# Patient Record
Sex: FEMALE | Race: OTHER | NOT HISPANIC OR LATINO | URBAN - METROPOLITAN AREA
[De-identification: names, ages, dates, MRNs, and addresses within clinical notes are randomized per-mention and may not be internally consistent; named-entity substitution may affect disease eponyms.]

---

## 2017-12-03 ENCOUNTER — EMERGENCY (EMERGENCY)
Facility: HOSPITAL | Age: 52
LOS: 1 days | Discharge: HOME | End: 2017-12-03
Attending: EMERGENCY MEDICINE | Admitting: EMERGENCY MEDICINE
Payer: COMMERCIAL

## 2017-12-03 VITALS
RESPIRATION RATE: 16 BRPM | HEART RATE: 104 BPM | TEMPERATURE: 98 F | DIASTOLIC BLOOD PRESSURE: 93 MMHG | OXYGEN SATURATION: 100 % | SYSTOLIC BLOOD PRESSURE: 145 MMHG

## 2017-12-03 PROCEDURE — 99285 EMERGENCY DEPT VISIT HI MDM: CPT | Mod: 25

## 2017-12-03 PROCEDURE — 20610 DRAIN/INJ JOINT/BURSA W/O US: CPT | Mod: RT

## 2017-12-03 NOTE — ED ADULT TRIAGE NOTE - CHIEF COMPLAINT QUOTE
Pt st" I woke up yesterday am with rt upper leg pain.....I can't walk...if I try to take a step I don't have strength to stand and upper leg is swollen." Denies trauma. Denies recent air travel/car trips. Hx Asthma, hysterectomy, left knee surgery.

## 2017-12-04 VITALS
DIASTOLIC BLOOD PRESSURE: 79 MMHG | RESPIRATION RATE: 14 BRPM | SYSTOLIC BLOOD PRESSURE: 147 MMHG | TEMPERATURE: 99 F | OXYGEN SATURATION: 100 % | HEART RATE: 89 BPM

## 2017-12-04 DIAGNOSIS — Z90.710 ACQUIRED ABSENCE OF BOTH CERVIX AND UTERUS: Chronic | ICD-10-CM

## 2017-12-04 LAB
ALBUMIN SERPL ELPH-MCNC: 4.2 G/DL — SIGNIFICANT CHANGE UP (ref 3.3–5)
ALP SERPL-CCNC: 133 U/L — HIGH (ref 40–120)
ALT FLD-CCNC: 19 U/L — SIGNIFICANT CHANGE UP (ref 4–33)
AST SERPL-CCNC: 24 U/L — SIGNIFICANT CHANGE UP (ref 4–32)
BASE EXCESS BLDV CALC-SCNC: 3.5 MMOL/L — SIGNIFICANT CHANGE UP
BASOPHILS # BLD AUTO: 0.03 K/UL — SIGNIFICANT CHANGE UP (ref 0–0.2)
BASOPHILS NFR BLD AUTO: 0.3 % — SIGNIFICANT CHANGE UP (ref 0–2)
BILIRUB SERPL-MCNC: 0.5 MG/DL — SIGNIFICANT CHANGE UP (ref 0.2–1.2)
BLOOD GAS VENOUS - CREATININE: 0.69 MG/DL — SIGNIFICANT CHANGE UP (ref 0.5–1.3)
BODY FLUID TYPE: SIGNIFICANT CHANGE UP
BUN SERPL-MCNC: 14 MG/DL — SIGNIFICANT CHANGE UP (ref 7–23)
C3 SERPL-MCNC: 156.4 MG/DL — SIGNIFICANT CHANGE UP (ref 90–180)
C4 SERPL-MCNC: 43.5 MG/DL — HIGH (ref 10–40)
CALCIUM SERPL-MCNC: 9 MG/DL — SIGNIFICANT CHANGE UP (ref 8.4–10.5)
CHLORIDE BLDV-SCNC: 103 MMOL/L — SIGNIFICANT CHANGE UP (ref 96–108)
CHLORIDE SERPL-SCNC: 100 MMOL/L — SIGNIFICANT CHANGE UP (ref 98–107)
CLARITY SPEC: SIGNIFICANT CHANGE UP
CO2 SERPL-SCNC: 25 MMOL/L — SIGNIFICANT CHANGE UP (ref 22–31)
COLOR FLD: SIGNIFICANT CHANGE UP
CREAT SERPL-MCNC: 0.8 MG/DL — SIGNIFICANT CHANGE UP (ref 0.5–1.3)
CRP SERPL-MCNC: 62.2 MG/L — HIGH
CRYSTALS FLD MICRO: SIGNIFICANT CHANGE UP
EOSINOPHIL # BLD AUTO: 0.08 K/UL — SIGNIFICANT CHANGE UP (ref 0–0.5)
EOSINOPHIL NFR BLD AUTO: 0.8 % — SIGNIFICANT CHANGE UP (ref 0–6)
GAS PNL BLDV: 141 MMOL/L — SIGNIFICANT CHANGE UP (ref 136–146)
GLUCOSE BLDV-MCNC: 108 — HIGH (ref 70–99)
GLUCOSE FLD-MCNC: 107 MG/DL — SIGNIFICANT CHANGE UP
GLUCOSE SERPL-MCNC: 101 MG/DL — HIGH (ref 70–99)
GRAM STN FLD: SIGNIFICANT CHANGE UP
HCO3 BLDV-SCNC: 26 MMOL/L — SIGNIFICANT CHANGE UP (ref 20–27)
HCT VFR BLD CALC: 44.9 % — SIGNIFICANT CHANGE UP (ref 34.5–45)
HCT VFR BLDV CALC: 45.1 % — HIGH (ref 34.5–45)
HGB BLD-MCNC: 14.4 G/DL — SIGNIFICANT CHANGE UP (ref 11.5–15.5)
HGB BLDV-MCNC: 14.7 G/DL — SIGNIFICANT CHANGE UP (ref 11.5–15.5)
IMM GRANULOCYTES # BLD AUTO: 0.03 # — SIGNIFICANT CHANGE UP
IMM GRANULOCYTES NFR BLD AUTO: 0.3 % — SIGNIFICANT CHANGE UP (ref 0–1.5)
LACTATE BLDV-MCNC: SIGNIFICANT CHANGE UP MMOL/L (ref 0.5–2)
LYMPHOCYTES # BLD AUTO: 1.74 K/UL — SIGNIFICANT CHANGE UP (ref 1–3.3)
LYMPHOCYTES # BLD AUTO: 17.8 % — SIGNIFICANT CHANGE UP (ref 13–44)
LYMPHOCYTES NFR FLD: 8 % — SIGNIFICANT CHANGE UP
MCHC RBC-ENTMCNC: 26.8 PG — LOW (ref 27–34)
MCHC RBC-ENTMCNC: 32.1 % — SIGNIFICANT CHANGE UP (ref 32–36)
MCV RBC AUTO: 83.5 FL — SIGNIFICANT CHANGE UP (ref 80–100)
MONOCYTES # BLD AUTO: 1.15 K/UL — HIGH (ref 0–0.9)
MONOCYTES # FLD: 9 % — SIGNIFICANT CHANGE UP
MONOCYTES NFR BLD AUTO: 11.8 % — SIGNIFICANT CHANGE UP (ref 2–14)
NEUTROPHILS # BLD AUTO: 6.72 K/UL — SIGNIFICANT CHANGE UP (ref 1.8–7.4)
NEUTROPHILS NFR BLD AUTO: 69 % — SIGNIFICANT CHANGE UP (ref 43–77)
NEUTS SEG NFR FLD MANUAL: 83 % — SIGNIFICANT CHANGE UP
NRBC # FLD: 0 — SIGNIFICANT CHANGE UP
PCO2 BLDV: 45 MMHG — SIGNIFICANT CHANGE UP (ref 41–51)
PH BLDV: 7.41 PH — SIGNIFICANT CHANGE UP (ref 7.32–7.43)
PLATELET # BLD AUTO: 222 K/UL — SIGNIFICANT CHANGE UP (ref 150–400)
PMV BLD: 12 FL — SIGNIFICANT CHANGE UP (ref 7–13)
PO2 BLDV: 29 MMHG — LOW (ref 35–40)
POTASSIUM BLDV-SCNC: 3.5 MMOL/L — SIGNIFICANT CHANGE UP (ref 3.4–4.5)
POTASSIUM SERPL-MCNC: 4 MMOL/L — SIGNIFICANT CHANGE UP (ref 3.5–5.3)
POTASSIUM SERPL-SCNC: 4 MMOL/L — SIGNIFICANT CHANGE UP (ref 3.5–5.3)
PROT FLD-MCNC: 7 G/DL — SIGNIFICANT CHANGE UP
PROT SERPL-MCNC: 7.9 G/DL — SIGNIFICANT CHANGE UP (ref 6–8.3)
RBC # BLD: 5.38 M/UL — HIGH (ref 3.8–5.2)
RBC # FLD: 14.4 % — SIGNIFICANT CHANGE UP (ref 10.3–14.5)
RCV VOL RI: HIGH CELL/UL (ref 0–5)
SAO2 % BLDV: 55.4 % — LOW (ref 60–85)
SODIUM SERPL-SCNC: 140 MMOL/L — SIGNIFICANT CHANGE UP (ref 135–145)
SPECIMEN SOURCE: SIGNIFICANT CHANGE UP
TOTAL CELLS COUNTED, BODY FLUID: 100 CELLS — SIGNIFICANT CHANGE UP
TOTAL NUCLEATED CELL COUNT, BODY FLUID: HIGH CELL/UL (ref 0–5)
WBC # BLD: 9.75 K/UL — SIGNIFICANT CHANGE UP (ref 3.8–10.5)
WBC # FLD AUTO: 9.75 K/UL — SIGNIFICANT CHANGE UP (ref 3.8–10.5)

## 2017-12-04 PROCEDURE — 73564 X-RAY EXAM KNEE 4 OR MORE: CPT | Mod: 26,RT

## 2017-12-04 RX ORDER — ACETAMINOPHEN 500 MG
1000 TABLET ORAL ONCE
Qty: 0 | Refills: 0 | Status: COMPLETED | OUTPATIENT
Start: 2017-12-04 | End: 2017-12-04

## 2017-12-04 RX ORDER — KETOROLAC TROMETHAMINE 30 MG/ML
15 SYRINGE (ML) INJECTION ONCE
Qty: 0 | Refills: 0 | Status: DISCONTINUED | OUTPATIENT
Start: 2017-12-04 | End: 2017-12-04

## 2017-12-04 RX ORDER — MORPHINE SULFATE 50 MG/1
4 CAPSULE, EXTENDED RELEASE ORAL ONCE
Qty: 0 | Refills: 0 | Status: DISCONTINUED | OUTPATIENT
Start: 2017-12-04 | End: 2017-12-04

## 2017-12-04 RX ORDER — OXYCODONE AND ACETAMINOPHEN 5; 325 MG/1; MG/1
1 TABLET ORAL ONCE
Qty: 0 | Refills: 0 | Status: DISCONTINUED | OUTPATIENT
Start: 2017-12-04 | End: 2017-12-04

## 2017-12-04 RX ORDER — IBUPROFEN 200 MG
1 TABLET ORAL
Qty: 21 | Refills: 0 | OUTPATIENT
Start: 2017-12-04 | End: 2017-12-11

## 2017-12-04 RX ADMIN — Medication 15 MILLIGRAM(S): at 02:31

## 2017-12-04 RX ADMIN — MORPHINE SULFATE 4 MILLIGRAM(S): 50 CAPSULE, EXTENDED RELEASE ORAL at 03:29

## 2017-12-04 RX ADMIN — Medication 15 MILLIGRAM(S): at 02:16

## 2017-12-04 RX ADMIN — Medication 400 MILLIGRAM(S): at 06:13

## 2017-12-04 NOTE — ED PROVIDER NOTE - OBJECTIVE STATEMENT
51 y/o F PMH asthma, HNP c/o atraumatic right knee pain and swelling 53 y/o F PMH asthma, HNP c/o atraumatic right knee pain and swelling x 2 days. Pt states she woke up with the pain, does not recall any trauma. Notes pain worsening and has been unable to weight bear on rle. Denies fever, chills, CP, SOB, numbness, tingling, weakness, h/o DVT/PE, recent travel/sx, hormone use. 51 y/o F PMH asthma, HNP c/o atraumatic right knee pain and swelling x 2 days. Pt states she woke up with the pain, does not recall any trauma. Notes pain worsening and has been unable to weight bear on RLE. Denies fever, chills, CP, SOB, numbness, tingling, weakness, h/o DVT/PE, recent travel/sx, hormone use.  Well appearing otherwise but appears uncomfortable when attempting to range right knee.

## 2017-12-04 NOTE — ED PROVIDER NOTE - MEDICAL DECISION MAKING DETAILS
53 y/o F with atraumatic right knee pain  -cbc, cmp, xray, pain control, reassess 51 y/o F with atraumatic right knee pain, swelling, and extremely limited ROM.  No infectious risk factors, but presentation concerning for septic joint.  Would send labs, pain control and tap knee.

## 2017-12-04 NOTE — ED PROVIDER NOTE - CARE PLAN
Principal Discharge DX:	Knee pain  Instructions for follow-up, activity and diet:	Rest, keep extremity elevated whenever possible  Apply ice to affected area 15 min on/15 min off  Keep splint clean, dry and intact  Take Motrin 600mg every 8 hours with food as needed for pain  Take Percocet 5/325mg every 6 hours as needed for severe pain- DO NOT drive on this medication  Follow up with your PMD within 2-3 days  Follow up with orthopedist within one week  Return to the ER for worsening symptoms including fever, redness or red streaking to affected area, etc. Principal Discharge DX:	Knee effusion, right  Instructions for follow-up, activity and diet:	Rest, keep extremity elevated whenever possible  Apply ice to affected area 15 min on/15 min off  Keep splint clean, dry and intact  Take Motrin 600mg every 8 hours with food as needed for pain  Take Percocet 5/325mg every 6 hours as needed for severe pain- DO NOT drive on this medication  Follow up with your PMD within 2-3 days  Follow up with orthopedist within one week  Return to the ER for worsening symptoms including fever, redness or red streaking to affected area, etc.

## 2017-12-04 NOTE — ED PROCEDURE NOTE - NS_EDPROVIDERDISPOUSERTYPE_ED_A_ED
I have personally evaluated and examined the patient. The Attending was available to me as a supervising provider if needed.
I have personally evaluated and examined the patient. The Attending was available to me as a supervising provider if needed.

## 2017-12-04 NOTE — ED PROVIDER NOTE - PSH
H/O: hysterectomy    History of Dilatation and Curettage    History of Nasal Septoplasty and sinus surgery 2003    left Knee  trauma  h/o repair 1985    S/P Oophorectomy right 2006

## 2017-12-04 NOTE — ED PROVIDER NOTE - PLAN OF CARE
Rest, keep extremity elevated whenever possible  Apply ice to affected area 15 min on/15 min off  Keep splint clean, dry and intact  Take Motrin 600mg every 8 hours with food as needed for pain  Take Percocet 5/325mg every 6 hours as needed for severe pain- DO NOT drive on this medication  Follow up with your PMD within 2-3 days  Follow up with orthopedist within one week  Return to the ER for worsening symptoms including fever, redness or red streaking to affected area, etc.

## 2017-12-04 NOTE — ED PROVIDER NOTE - PROGRESS NOTE DETAILS
arthrocentesis results do not appear to be infectious in origin, pt has had minimal relief with pain meds but states she would like to go home and take po meds and f/u with ortho, crutches and knee immobilizer given, pt ambulates well with crutches.

## 2017-12-04 NOTE — ED PROVIDER NOTE - SKIN AREA #1
multiple small abrasion to b/l le from cat scratches, no surrounsding erythema or red streaking, not hot to touch/anterior

## 2017-12-04 NOTE — ED PROCEDURE NOTE - CPROC ED POST PROC CARE GUIDE1
Verbal/written post procedure instructions were given to patient/caregiver./Instructed patient/caregiver to follow-up with primary care physician./Instructed patient/caregiver regarding signs and symptoms of infection./Elevate the injured extremity as instructed./Keep the cast/splint/dressing clean and dry.
Instructed patient/caregiver regarding signs and symptoms of infection./Instructed patient/caregiver to follow-up with primary care physician./Keep the cast/splint/dressing clean and dry./Verbal/written post procedure instructions were given to patient/caregiver.

## 2017-12-04 NOTE — ED PROVIDER NOTE - ATTENDING CONTRIBUTION TO CARE
ED Attending (Dr Bolton): I have personally performed a face to face bedside history and physical examination of this patient.  I have discussed the case with the PA and  I have personally authored the following components: HPI, ROS, Physical Exam and MDM in addition to reviewing and revising the rest of the Provider Note.  51 y/o F with atraumatic right knee pain, swelling, and extremely limited ROM.  No infectious risk factors, but presentation concerning for septic joint.  Would send labs, pain control and tap knee.

## 2017-12-05 LAB
SPECIMEN SOURCE: SIGNIFICANT CHANGE UP
SPECIMEN SOURCE: SIGNIFICANT CHANGE UP

## 2017-12-09 LAB
BACTERIA BLD CULT: SIGNIFICANT CHANGE UP
BACTERIA BLD CULT: SIGNIFICANT CHANGE UP
BACTERIA FLD CULT: SIGNIFICANT CHANGE UP

## 2018-09-09 NOTE — ED PROVIDER NOTE - LOWER EXTREMITY EXAM, RIGHT
JOINT SWELLING/LIMITED ROM/SWELLING/no obvious deformity, + generalized swelling greatest over lateral suprapatellar aspect, + hot to touch, no erythema or red streaking, + TTP to anterior knee greatest over lateral suprapatellar region, ROM limited - able to passively flex toapprox 30 degrees and fully extend, sensation and strength intact, 2+DP/TENDERNESS Yes

## 2019-03-28 ENCOUNTER — INPATIENT (INPATIENT)
Facility: HOSPITAL | Age: 54
LOS: 3 days | Discharge: HOME | End: 2019-04-01
Attending: INTERNAL MEDICINE | Admitting: INTERNAL MEDICINE
Payer: COMMERCIAL

## 2019-03-28 VITALS — WEIGHT: 229.28 LBS

## 2019-03-28 DIAGNOSIS — Z90.710 ACQUIRED ABSENCE OF BOTH CERVIX AND UTERUS: Chronic | ICD-10-CM

## 2019-03-28 LAB
APTT BLD: 32.7 SEC — SIGNIFICANT CHANGE UP (ref 27.5–36.3)
BASE EXCESS BLDV CALC-SCNC: 4.3 MMOL/L — SIGNIFICANT CHANGE UP
BASOPHILS # BLD AUTO: 0.03 K/UL — SIGNIFICANT CHANGE UP (ref 0–0.2)
BASOPHILS NFR BLD AUTO: 0.5 % — SIGNIFICANT CHANGE UP (ref 0–2)
BLOOD GAS VENOUS - CREATININE: SIGNIFICANT CHANGE UP MG/DL (ref 0.5–1.3)
CHLORIDE BLDV-SCNC: 107 MMOL/L — SIGNIFICANT CHANGE UP (ref 96–108)
EOSINOPHIL # BLD AUTO: 0.19 K/UL — SIGNIFICANT CHANGE UP (ref 0–0.5)
EOSINOPHIL NFR BLD AUTO: 3.3 % — SIGNIFICANT CHANGE UP (ref 0–6)
GAS PNL BLDV: 141 MMOL/L — SIGNIFICANT CHANGE UP (ref 136–146)
GLUCOSE BLDV-MCNC: 150 — HIGH (ref 70–99)
HCO3 BLDV-SCNC: 28 MMOL/L — HIGH (ref 20–27)
HCT VFR BLD CALC: 45 % — SIGNIFICANT CHANGE UP (ref 34.5–45)
HCT VFR BLDV CALC: 41.6 % — SIGNIFICANT CHANGE UP (ref 34.5–45)
HGB BLD-MCNC: 13.8 G/DL — SIGNIFICANT CHANGE UP (ref 11.5–15.5)
HGB BLDV-MCNC: 13.6 G/DL — SIGNIFICANT CHANGE UP (ref 11.5–15.5)
IMM GRANULOCYTES NFR BLD AUTO: 0.4 % — SIGNIFICANT CHANGE UP (ref 0–1.5)
INR BLD: 0.98 — SIGNIFICANT CHANGE UP (ref 0.88–1.17)
LACTATE BLDV-MCNC: 2.3 MMOL/L — HIGH (ref 0.5–2)
LYMPHOCYTES # BLD AUTO: 1.69 K/UL — SIGNIFICANT CHANGE UP (ref 1–3.3)
LYMPHOCYTES # BLD AUTO: 29.7 % — SIGNIFICANT CHANGE UP (ref 13–44)
MCHC RBC-ENTMCNC: 26.1 PG — LOW (ref 27–34)
MCHC RBC-ENTMCNC: 30.7 % — LOW (ref 32–36)
MCV RBC AUTO: 85.2 FL — SIGNIFICANT CHANGE UP (ref 80–100)
MONOCYTES # BLD AUTO: 0.74 K/UL — SIGNIFICANT CHANGE UP (ref 0–0.9)
MONOCYTES NFR BLD AUTO: 13 % — SIGNIFICANT CHANGE UP (ref 2–14)
NEUTROPHILS # BLD AUTO: 3.02 K/UL — SIGNIFICANT CHANGE UP (ref 1.8–7.4)
NEUTROPHILS NFR BLD AUTO: 53.1 % — SIGNIFICANT CHANGE UP (ref 43–77)
NRBC # FLD: 0 K/UL — SIGNIFICANT CHANGE UP (ref 0–0)
PCO2 BLDV: 45 MMHG — SIGNIFICANT CHANGE UP (ref 41–51)
PH BLDV: 7.42 PH — SIGNIFICANT CHANGE UP (ref 7.32–7.43)
PLATELET # BLD AUTO: 232 K/UL — SIGNIFICANT CHANGE UP (ref 150–400)
PMV BLD: 11.3 FL — SIGNIFICANT CHANGE UP (ref 7–13)
PO2 BLDV: 51 MMHG — HIGH (ref 35–40)
POTASSIUM BLDV-SCNC: 3.9 MMOL/L — SIGNIFICANT CHANGE UP (ref 3.4–4.5)
PROTHROM AB SERPL-ACNC: 11.2 SEC — SIGNIFICANT CHANGE UP (ref 9.8–13.1)
RBC # BLD: 5.28 M/UL — HIGH (ref 3.8–5.2)
RBC # FLD: 14.5 % — SIGNIFICANT CHANGE UP (ref 10.3–14.5)
SAO2 % BLDV: 86.6 % — HIGH (ref 60–85)
WBC # BLD: 5.69 K/UL — SIGNIFICANT CHANGE UP (ref 3.8–10.5)
WBC # FLD AUTO: 5.69 K/UL — SIGNIFICANT CHANGE UP (ref 3.8–10.5)

## 2019-03-28 PROCEDURE — 71045 X-RAY EXAM CHEST 1 VIEW: CPT | Mod: 26

## 2019-03-28 RX ORDER — HEPARIN SODIUM 5000 [USP'U]/ML
4000 INJECTION INTRAVENOUS; SUBCUTANEOUS EVERY 6 HOURS
Qty: 0 | Refills: 0 | Status: DISCONTINUED | OUTPATIENT
Start: 2019-03-28 | End: 2019-03-29

## 2019-03-28 RX ORDER — HEPARIN SODIUM 5000 [USP'U]/ML
INJECTION INTRAVENOUS; SUBCUTANEOUS
Qty: 25000 | Refills: 0 | Status: DISCONTINUED | OUTPATIENT
Start: 2019-03-28 | End: 2019-03-29

## 2019-03-28 RX ORDER — HEPARIN SODIUM 5000 [USP'U]/ML
4000 INJECTION INTRAVENOUS; SUBCUTANEOUS ONCE
Qty: 0 | Refills: 0 | Status: COMPLETED | OUTPATIENT
Start: 2019-03-28 | End: 2019-03-28

## 2019-03-28 RX ORDER — CLOPIDOGREL BISULFATE 75 MG/1
600 TABLET, FILM COATED ORAL ONCE
Qty: 0 | Refills: 0 | Status: COMPLETED | OUTPATIENT
Start: 2019-03-28 | End: 2019-03-28

## 2019-03-28 RX ADMIN — CLOPIDOGREL BISULFATE 600 MILLIGRAM(S): 75 TABLET, FILM COATED ORAL at 23:24

## 2019-03-28 RX ADMIN — HEPARIN SODIUM 4000 UNIT(S): 5000 INJECTION INTRAVENOUS; SUBCUTANEOUS at 23:24

## 2019-03-28 RX ADMIN — HEPARIN SODIUM 1000 UNIT(S)/HR: 5000 INJECTION INTRAVENOUS; SUBCUTANEOUS at 23:25

## 2019-03-28 NOTE — ED ADULT NURSE REASSESSMENT NOTE - NS ED NURSE REASSESS COMMENT FT1
As per MD Shea, give heparin prior to PTT resulting. PTT sent. Awaiting results. Heparin verified with facilitator RN at bedside. Will continue to monitor.

## 2019-03-28 NOTE — ED ADULT TRIAGE NOTE - CHIEF COMPLAINT QUOTE
pt brought directly to Trauma B as STEMI notification- pt c/o CP with radiation to left arm and left jaw while watching TV tonight

## 2019-03-28 NOTE — ED ADULT NURSE NOTE - OBJECTIVE STATEMENT
facilitator RN- pt brought directly to Tr. A as STEMI notification, pt c/o substernal CP with radiation to left jaw and down left arm while watching TV tonight- pt denies any SOB, n/v/d, dizziness, visual changes- pt currently awake, a/ox3, vitally stable, 18g IV in place to right forearm, 18G placed to right hand, blood work sent, pt placed on monitor, and Zole- EDMD as well as cardiology at bedside for further evaluation, handoff given to primary RN-

## 2019-03-28 NOTE — ED PROVIDER NOTE - PROGRESS NOTE DETAILS
cards np at bedside, cath lab activated, team on way in, plavix/heparin given, patient stable, ready for transport when cath ready

## 2019-03-28 NOTE — ED PROVIDER NOTE - CLINICAL SUMMARY MEDICAL DECISION MAKING FREE TEXT BOX
54yo with chest pain, lateral lead ST elevations, Code stemi called   heparin/plavix initiated, cards fellow on way in.

## 2019-03-28 NOTE — ED PROVIDER NOTE - OBJECTIVE STATEMENT
54yo F h/o asthma, neuropathy, p/w substernal CP radiating to left shoulder, started while watching TV. no associated symptoms, never had similar symptoms in past. no shortness of breath. no cough, no fever, no numbness/tingling. no h/o DVT/PE  took 1 asa at home, got 162 asa from EMS>

## 2019-03-28 NOTE — ED PROVIDER NOTE - ATTENDING CONTRIBUTION TO CARE
Patient presents with chest pain that started at 9 pm this evening, constant, tightness, central and left side, radiating to left shoulder and jaw. No nausea, vomiting, sweating, sob, fever, chills, abd pain, diarrhea, dysuria. Never had pain like this before. no fhx of cardiac dx, No personal hx of cardiac dx. no recent travel, no le edema. took 162 asa and then ems gave 162 asa.  exam  GEN - NAD; well appearing; A+O x3   HEAD - NC/AT   EYES- PERRL, EOMI  ENT: Airway patent, mmm, Oral cavity and pharynx normal. No inflammation, swelling, exudate, or lesions.  NECK: Neck supple, non-tender without lymphadenopathy, no masses.  PULMONARY - CTA b/l, symmetric breath sounds.   CARDIAC -s1s2, RRR, no M,G,R  ABDOMEN - +BS, ND, NT, soft, no guarding, no rebound, no masses   BACK - no CVA tenderness, Normal  spine   EXTREMITIES - FROM, symmetric pulses, capillary refill < 2 seconds, no edema   SKIN - no rash or bruising   NEUROLOGIC - alert, speech clear, no focal deficits  PSYCH -nl mood/affect, nl insight.  a/p-patient with chest pain that started 9pm this evening, radiating to jaw/lue. Stemi code activated at 11:07 PM, d/w dr. caldwell. Per dr. caldwell, given atypical ekg, recom plavix/heparin, his team to see patient.

## 2019-03-29 ENCOUNTER — TRANSCRIPTION ENCOUNTER (OUTPATIENT)
Age: 54
End: 2019-03-29

## 2019-03-29 DIAGNOSIS — I21.3 ST ELEVATION (STEMI) MYOCARDIAL INFARCTION OF UNSPECIFIED SITE: ICD-10-CM

## 2019-03-29 DIAGNOSIS — J45.20 MILD INTERMITTENT ASTHMA, UNCOMPLICATED: ICD-10-CM

## 2019-03-29 DIAGNOSIS — M79.7 FIBROMYALGIA: ICD-10-CM

## 2019-03-29 DIAGNOSIS — I21.02 ST ELEVATION (STEMI) MYOCARDIAL INFARCTION INVOLVING LEFT ANTERIOR DESCENDING CORONARY ARTERY: ICD-10-CM

## 2019-03-29 LAB
ALBUMIN SERPL ELPH-MCNC: 4.1 G/DL — SIGNIFICANT CHANGE UP (ref 3.3–5)
ALP SERPL-CCNC: 117 U/L — SIGNIFICANT CHANGE UP (ref 40–120)
ALT FLD-CCNC: 20 U/L — SIGNIFICANT CHANGE UP (ref 4–33)
ANION GAP SERPL CALC-SCNC: 13 MMO/L — SIGNIFICANT CHANGE UP (ref 7–14)
ANION GAP SERPL CALC-SCNC: 14 MMO/L — SIGNIFICANT CHANGE UP (ref 7–14)
APTT BLD: 31.2 SEC — SIGNIFICANT CHANGE UP (ref 27.5–36.3)
AST SERPL-CCNC: 26 U/L — SIGNIFICANT CHANGE UP (ref 4–32)
BILIRUB SERPL-MCNC: 0.2 MG/DL — SIGNIFICANT CHANGE UP (ref 0.2–1.2)
BUN SERPL-MCNC: 12 MG/DL — SIGNIFICANT CHANGE UP (ref 7–23)
BUN SERPL-MCNC: 9 MG/DL — SIGNIFICANT CHANGE UP (ref 7–23)
CALCIUM SERPL-MCNC: 9.1 MG/DL — SIGNIFICANT CHANGE UP (ref 8.4–10.5)
CALCIUM SERPL-MCNC: 9.1 MG/DL — SIGNIFICANT CHANGE UP (ref 8.4–10.5)
CHLORIDE SERPL-SCNC: 103 MMOL/L — SIGNIFICANT CHANGE UP (ref 98–107)
CHLORIDE SERPL-SCNC: 104 MMOL/L — SIGNIFICANT CHANGE UP (ref 98–107)
CHOLEST SERPL-MCNC: 225 MG/DL — HIGH (ref 120–199)
CK MB BLD-MCNC: 1 — SIGNIFICANT CHANGE UP (ref 0–2.5)
CK MB BLD-MCNC: 11.6 — HIGH (ref 0–2.5)
CK MB BLD-MCNC: 135 NG/ML — HIGH (ref 1–4.7)
CK MB BLD-MCNC: 179.7 NG/ML — HIGH (ref 1–4.7)
CK MB BLD-MCNC: 2.04 NG/ML — SIGNIFICANT CHANGE UP (ref 1–4.7)
CK MB BLD-MCNC: 4.6 — HIGH (ref 0–2.5)
CK MB BLD-MCNC: 59.05 NG/ML — HIGH (ref 1–4.7)
CK MB BLD-MCNC: 8.3 — HIGH (ref 0–2.5)
CK SERPL-CCNC: 1272 U/L — HIGH (ref 25–170)
CK SERPL-CCNC: 1545 U/L — HIGH (ref 25–170)
CK SERPL-CCNC: 1621 U/L — HIGH (ref 25–170)
CK SERPL-CCNC: 200 U/L — HIGH (ref 25–170)
CO2 SERPL-SCNC: 25 MMOL/L — SIGNIFICANT CHANGE UP (ref 22–31)
CO2 SERPL-SCNC: 26 MMOL/L — SIGNIFICANT CHANGE UP (ref 22–31)
CREAT SERPL-MCNC: 0.71 MG/DL — SIGNIFICANT CHANGE UP (ref 0.5–1.3)
CREAT SERPL-MCNC: 0.79 MG/DL — SIGNIFICANT CHANGE UP (ref 0.5–1.3)
GLUCOSE SERPL-MCNC: 142 MG/DL — HIGH (ref 70–99)
GLUCOSE SERPL-MCNC: 98 MG/DL — SIGNIFICANT CHANGE UP (ref 70–99)
HCT VFR BLD CALC: 43.2 % — SIGNIFICANT CHANGE UP (ref 34.5–45)
HCV AB S/CO SERPL IA: 0.07 S/CO — SIGNIFICANT CHANGE UP (ref 0–0.79)
HCV AB SERPL-IMP: SIGNIFICANT CHANGE UP
HDLC SERPL-MCNC: 67 MG/DL — HIGH (ref 45–65)
HGB BLD-MCNC: 13.2 G/DL — SIGNIFICANT CHANGE UP (ref 11.5–15.5)
LIPID PNL WITH DIRECT LDL SERPL: 155 MG/DL — SIGNIFICANT CHANGE UP
MAGNESIUM SERPL-MCNC: 2 MG/DL — SIGNIFICANT CHANGE UP (ref 1.6–2.6)
MCHC RBC-ENTMCNC: 25.7 PG — LOW (ref 27–34)
MCHC RBC-ENTMCNC: 30.6 % — LOW (ref 32–36)
MCV RBC AUTO: 84.2 FL — SIGNIFICANT CHANGE UP (ref 80–100)
NRBC # FLD: 0 K/UL — SIGNIFICANT CHANGE UP (ref 0–0)
PLATELET # BLD AUTO: 238 K/UL — SIGNIFICANT CHANGE UP (ref 150–400)
PMV BLD: 11.6 FL — SIGNIFICANT CHANGE UP (ref 7–13)
POTASSIUM SERPL-MCNC: 3.7 MMOL/L — SIGNIFICANT CHANGE UP (ref 3.5–5.3)
POTASSIUM SERPL-MCNC: 4 MMOL/L — SIGNIFICANT CHANGE UP (ref 3.5–5.3)
POTASSIUM SERPL-SCNC: 3.7 MMOL/L — SIGNIFICANT CHANGE UP (ref 3.5–5.3)
POTASSIUM SERPL-SCNC: 4 MMOL/L — SIGNIFICANT CHANGE UP (ref 3.5–5.3)
PROT SERPL-MCNC: 7.5 G/DL — SIGNIFICANT CHANGE UP (ref 6–8.3)
RBC # BLD: 5.13 M/UL — SIGNIFICANT CHANGE UP (ref 3.8–5.2)
RBC # FLD: 14.4 % — SIGNIFICANT CHANGE UP (ref 10.3–14.5)
SODIUM SERPL-SCNC: 142 MMOL/L — SIGNIFICANT CHANGE UP (ref 135–145)
SODIUM SERPL-SCNC: 143 MMOL/L — SIGNIFICANT CHANGE UP (ref 135–145)
TRIGL SERPL-MCNC: 94 MG/DL — SIGNIFICANT CHANGE UP (ref 10–149)
TROPONIN T, HIGH SENSITIVITY: 2450 NG/L — CRITICAL HIGH (ref ?–14)
TROPONIN T, HIGH SENSITIVITY: 2803 NG/L — CRITICAL HIGH (ref ?–14)
TROPONIN T, HIGH SENSITIVITY: 3441 NG/L — CRITICAL HIGH (ref ?–14)
TROPONIN T, HIGH SENSITIVITY: 59 NG/L — CRITICAL HIGH (ref ?–14)
TSH SERPL-MCNC: 2.61 UIU/ML — SIGNIFICANT CHANGE UP (ref 0.27–4.2)
WBC # BLD: 8.59 K/UL — SIGNIFICANT CHANGE UP (ref 3.8–10.5)
WBC # FLD AUTO: 8.59 K/UL — SIGNIFICANT CHANGE UP (ref 3.8–10.5)

## 2019-03-29 PROCEDURE — 92941 PRQ TRLML REVSC TOT OCCL AMI: CPT | Mod: LD

## 2019-03-29 PROCEDURE — 99223 1ST HOSP IP/OBS HIGH 75: CPT

## 2019-03-29 PROCEDURE — 93458 L HRT ARTERY/VENTRICLE ANGIO: CPT | Mod: 26,59

## 2019-03-29 PROCEDURE — 93880 EXTRACRANIAL BILAT STUDY: CPT | Mod: 26

## 2019-03-29 RX ORDER — ASPIRIN/CALCIUM CARB/MAGNESIUM 324 MG
81 TABLET ORAL DAILY
Qty: 0 | Refills: 0 | Status: DISCONTINUED | OUTPATIENT
Start: 2019-03-29 | End: 2019-04-01

## 2019-03-29 RX ORDER — HEPARIN SODIUM 5000 [USP'U]/ML
5000 INJECTION INTRAVENOUS; SUBCUTANEOUS EVERY 12 HOURS
Qty: 0 | Refills: 0 | Status: DISCONTINUED | OUTPATIENT
Start: 2019-03-29 | End: 2019-03-30

## 2019-03-29 RX ORDER — CYCLOBENZAPRINE HYDROCHLORIDE 10 MG/1
10 TABLET, FILM COATED ORAL DAILY
Qty: 0 | Refills: 0 | Status: DISCONTINUED | OUTPATIENT
Start: 2019-03-29 | End: 2019-04-01

## 2019-03-29 RX ORDER — METOPROLOL TARTRATE 50 MG
25 TABLET ORAL
Qty: 0 | Refills: 0 | Status: DISCONTINUED | OUTPATIENT
Start: 2019-03-29 | End: 2019-03-29

## 2019-03-29 RX ORDER — ZOLPIDEM TARTRATE 10 MG/1
10 TABLET ORAL AT BEDTIME
Qty: 0 | Refills: 0 | Status: DISCONTINUED | OUTPATIENT
Start: 2019-03-29 | End: 2019-04-01

## 2019-03-29 RX ORDER — CLOPIDOGREL BISULFATE 75 MG/1
75 TABLET, FILM COATED ORAL DAILY
Qty: 0 | Refills: 0 | Status: DISCONTINUED | OUTPATIENT
Start: 2019-03-29 | End: 2019-04-01

## 2019-03-29 RX ORDER — ATORVASTATIN CALCIUM 80 MG/1
80 TABLET, FILM COATED ORAL AT BEDTIME
Qty: 0 | Refills: 0 | Status: DISCONTINUED | OUTPATIENT
Start: 2019-03-29 | End: 2019-04-01

## 2019-03-29 RX ORDER — CHLORHEXIDINE GLUCONATE 213 G/1000ML
1 SOLUTION TOPICAL
Qty: 0 | Refills: 0 | Status: DISCONTINUED | OUTPATIENT
Start: 2019-03-29 | End: 2019-04-01

## 2019-03-29 RX ORDER — DULOXETINE HYDROCHLORIDE 30 MG/1
30 CAPSULE, DELAYED RELEASE ORAL DAILY
Qty: 0 | Refills: 0 | Status: DISCONTINUED | OUTPATIENT
Start: 2019-03-29 | End: 2019-04-01

## 2019-03-29 RX ORDER — ALBUTEROL 90 UG/1
2 AEROSOL, METERED ORAL EVERY 6 HOURS
Qty: 0 | Refills: 0 | Status: DISCONTINUED | OUTPATIENT
Start: 2019-03-29 | End: 2019-04-01

## 2019-03-29 RX ORDER — LISINOPRIL 2.5 MG/1
2.5 TABLET ORAL DAILY
Qty: 0 | Refills: 0 | Status: DISCONTINUED | OUTPATIENT
Start: 2019-03-29 | End: 2019-03-29

## 2019-03-29 RX ORDER — METOPROLOL TARTRATE 50 MG
12.5 TABLET ORAL
Qty: 0 | Refills: 0 | Status: DISCONTINUED | OUTPATIENT
Start: 2019-03-29 | End: 2019-03-31

## 2019-03-29 RX ORDER — BUDESONIDE AND FORMOTEROL FUMARATE DIHYDRATE 160; 4.5 UG/1; UG/1
2 AEROSOL RESPIRATORY (INHALATION)
Qty: 0 | Refills: 0 | Status: DISCONTINUED | OUTPATIENT
Start: 2019-03-29 | End: 2019-04-01

## 2019-03-29 RX ORDER — MONTELUKAST 4 MG/1
10 TABLET, CHEWABLE ORAL DAILY
Qty: 0 | Refills: 0 | Status: DISCONTINUED | OUTPATIENT
Start: 2019-03-29 | End: 2019-04-01

## 2019-03-29 RX ADMIN — Medication 12.5 MILLIGRAM(S): at 06:45

## 2019-03-29 RX ADMIN — ATORVASTATIN CALCIUM 80 MILLIGRAM(S): 80 TABLET, FILM COATED ORAL at 22:27

## 2019-03-29 RX ADMIN — Medication 81 MILLIGRAM(S): at 11:34

## 2019-03-29 RX ADMIN — CYCLOBENZAPRINE HYDROCHLORIDE 10 MILLIGRAM(S): 10 TABLET, FILM COATED ORAL at 02:26

## 2019-03-29 RX ADMIN — DULOXETINE HYDROCHLORIDE 30 MILLIGRAM(S): 30 CAPSULE, DELAYED RELEASE ORAL at 11:34

## 2019-03-29 RX ADMIN — CLOPIDOGREL BISULFATE 75 MILLIGRAM(S): 75 TABLET, FILM COATED ORAL at 11:34

## 2019-03-29 RX ADMIN — ZOLPIDEM TARTRATE 10 MILLIGRAM(S): 10 TABLET ORAL at 03:30

## 2019-03-29 RX ADMIN — HEPARIN SODIUM 5000 UNIT(S): 5000 INJECTION INTRAVENOUS; SUBCUTANEOUS at 06:45

## 2019-03-29 RX ADMIN — HEPARIN SODIUM 5000 UNIT(S): 5000 INJECTION INTRAVENOUS; SUBCUTANEOUS at 18:39

## 2019-03-29 RX ADMIN — MONTELUKAST 10 MILLIGRAM(S): 4 TABLET, CHEWABLE ORAL at 11:34

## 2019-03-29 RX ADMIN — Medication 12.5 MILLIGRAM(S): at 18:39

## 2019-03-29 RX ADMIN — CHLORHEXIDINE GLUCONATE 1 APPLICATION(S): 213 SOLUTION TOPICAL at 11:35

## 2019-03-29 RX ADMIN — BUDESONIDE AND FORMOTEROL FUMARATE DIHYDRATE 2 PUFF(S): 160; 4.5 AEROSOL RESPIRATORY (INHALATION) at 22:52

## 2019-03-29 NOTE — DISCHARGE NOTE PROVIDER - CARE PROVIDER_API CALL
Roberta Cronin)  Cardiovascular Disease  [facility], [address] [place] [address] [id number] [place] [id number]  Phone: [phone]  Fax: [phone]  Follow Up Time:

## 2019-03-29 NOTE — H&P ADULT - PROBLEM SELECTOR PLAN 1
admit to CCu  Patient presented to ER with c/o chest pain. EKG revealed ST elevation- Initial set cardiac enzyme 200,trop 59  Given active chest pain and VERO, s/p urgent cath. ASA/Plavix and heparin loaded in ED  Cath showed 100% pLAD s/p stent  Assess for resolution of chest pain and for recurrent chest pain  Serial EKG to assess for resolution of ST changes  Monitor vital signs to monitor hemodynamic stability  Continuous cardiac monitoring to monitor for arrhythmias  CBC, CMP, coags, HbA1C, TSH, lipids for comorbidities, Trend cardiac enzymes  Aspirin 81 PO daily, Clopidogrel 75 PO daily, Lipitor 80 mg PO daily  Post cardiac cath care as per protocol.   Check radial site for hematoma or bleeding.  Introduce beta blockers as tolerated: Metoprolol 12.5 mg BID  Introduce ACE as tolerated. Hold for now due to recent dye load due to cardiac cath.  Low fat DASH diet  ECHO: 2D ECHO in 3-4 days to evaluate LV function admit to CCU  Patient presented to ER with c/o chest pain. EKG revealed ST elevation- Initial set cardiac enzyme 200,trop 59  Given active chest pain and VERO, s/p urgent cath. ASA/Plavix and heparin loaded in ED  Cath showed 100% pLAD s/p stent  Assess for resolution of chest pain and for recurrent chest pain  Serial EKG to assess for resolution of ST changes  Monitor vital signs to monitor hemodynamic stability  Continuous cardiac monitoring to monitor for arrhythmias  CBC, CMP, coags, HbA1C, TSH, lipids for comorbidities, Trend cardiac enzymes  Aspirin 81 PO daily, Clopidogrel 75 PO daily, Lipitor 80 mg PO daily  Post cardiac cath care as per protocol.   Check radial site for hematoma or bleeding.  Introduce beta blockers as tolerated: Metoprolol 12.5 mg BID  Introduce ACE as tolerated. Hold for now due to recent dye load due to cardiac cath.  Low fat DASH diet  ECHO: 2D ECHO in 3-4 days to evaluate LV function

## 2019-03-29 NOTE — H&P ADULT - ASSESSMENT
52yo F h/o asthma, fibromyalgia , p/w substernal  9/10 chest  pressure with numbness and tingling to  left shoulder and neck found to have STEMI s/p stent to 100% pLAD

## 2019-03-29 NOTE — H&P ADULT - NSHPREVIEWOFSYSTEMS_GEN_ALL_CORE
REVIEW OF SYSTEMS    General: no fatigue/malaise, weight loss/gain.  Skin: no rashes.  Ophthalmologic: no blurred vision, no loss of vision. 	  ENT: no sore throat, rhinorrhea, sinus congestion.  Cardiovascular:+ chest pain ,no palpitation, no dizziness, no diaphoresis, no edema  Respiratory: no SOB, cough or wheeze.  Gastrointestinal:  no N/V/D, no melena/hematemesis/hematochezia.  Genitourinary: no dysuria/hesitancy or hematuria.  Musculoskeletal: no myalgias or arthralgias,b/l legs numbness  Neurological: no changes in vision or hearing, no lightheadedness/dizziness, no syncope/near syncope	  Psychiatric: no unusual stress/anxiety

## 2019-03-29 NOTE — PROGRESS NOTE ADULT - ASSESSMENT
52yo F h/o asthma, fibromyalgia , p/w substernal  9/10 chest  pressure with numbness and tingling to  left shoulder and neck found to have STEMI s/p stent to 100% pLAD 52yo F h/o asthma, fibromyalgia , p/w substernal  9/10 chest  pressure with numbness and tingling to  left shoulder and neck found to have STEMI s/p stent to 100% pLAD. pt with VSS, concerned for Fibromuscular dysplasia w' spontaneous coronary artery dissection, pending duplex ( carotids and renal)    #Neuro- fibromyalgia  - A&O  - c/w duloxetine  - c/w cyclobenzaprine  - PRN- zolpidem    #Resp: asthma  - RONNY  - satting well RA  - Symbicort BID  - Montelukast QD  - PRN albuterol    # CV- s/p stent to LAD for STEMI  - TTE tmmr  - Trend cardiac enzymes  - c/w ASA and Plavix  - c/w Statin  - pending carotid/renal dopplers concern for SCAD/FMD    #GI  - DASH/TLC    #  - trend Bun/cr  - trend UO  - RONNY    Endo  - TSH - wnl  - f/u A1C    Heme:  - c/w ASA/Plavix  - HSQ- Dvt ppx  - c/w statin    #ID  - RONNY

## 2019-03-29 NOTE — H&P ADULT - NSHPPHYSICALEXAM_GEN_ALL_CORE
PHYSICAL EXAM:      Appearance: Normal	  HEENT:   Normal oral mucosa, PERRL, EOMI	  Lymphatic: No lymphadenopathy  Cardiovascular: Normal S1 S2, No JVD, No murmurs, No edema  Respiratory: Lungs clear to auscultation	  Psychiatry: A & O x 3, Mood & affect appropriate  Gastrointestinal:  Soft, Non-tender, + BS	  Skin: No rashes, No ecchymoses, No cyanosis	  Neurologic: Non-focal  Extremities: Normal range of motion, No clubbing, cyanosis or edema  Vascular: Peripheral pulses palpable 2+ bilaterally

## 2019-03-29 NOTE — H&P ADULT - NSICDXPASTMEDICALHX_GEN_ALL_CORE_FT
PAST MEDICAL HISTORY:  Asthma - never intubated  but hospitalized >5yrs ago     Carpal Tunnel Syndrome

## 2019-03-29 NOTE — H&P ADULT - HISTORY OF PRESENT ILLNESS
HPI: 54yo F h/o asthma, fibromyalgia , p/w substernal  9/10 chest  pressure with numbness and tingling to  left shoulder and neck , started while watching TV around 9pm .No associated symptoms, never had similar symptoms in past. Deneis  shortness of breath,  cough,, fever. Suffer from chronic b/l leg pain and numbness. She took ASA 325mg PO at home and 81mg x2 tab form EMS.In Ed  EKG showed  diffuse VERO in I,II, V2-V6 and TWI in III,V1  which progressive got worsen on repeat EKGs. She continue to have chest pressure 7-8/10.She was loaded with Plavix 600mg Po and Heparin IV. Spoke to dr Shaw and cath team was activated .She underwent  left heart cath which revealed Distal  % stenosis and received one MINDI ,EF 55%

## 2019-03-29 NOTE — H&P ADULT - NSICDXPASTSURGICALHX_GEN_ALL_CORE_FT
PAST SURGICAL HISTORY:  H/O: hysterectomy     History of Dilatation and Curettage     History of Nasal Septoplasty and sinus surgery 2003     left Knee  trauma  h/o repair 1985     S/P Oophorectomy right 2006

## 2019-03-29 NOTE — ED ADULT NURSE REASSESSMENT NOTE - NS ED NURSE REASSESS COMMENT FT1
pt sent to cath lab with facilitator RN, CCU NP, CCU RN and ED tech on zole with rescue meds and BVM at bedside- pt left ED, awake, a/ox3, vitals as noted

## 2019-03-29 NOTE — DISCHARGE NOTE PROVIDER - HOSPITAL COURSE
53F with h/o asthma and fibromyalgia presents with sudden onset chest pressure with numbness and tingling to L shoulder. In ER EKG concerning for VERO in lateral leads with elevated cardiac enzymes. Patient was taken for urgent cardiac cath and MINDI X 1 was placed to 100% pLAD lesion. Transferred to CCU in stable condition. Based on presentation, C findings, and h/o fibromyalgia there is a concern for SCAD (sudden coronary artery dissection). Workup with carotid dopplers and abd/pelvic ultrasound for renal artery assessment is pending. Continue treatment with DAPT, atorvastatin, and metoprolol. Will consider adding ACE/ARB once renal arteries are assessed. ECHO is pending for 3/30. 53F with h/o asthma and fibromyalgia presents with sudden onset chest pressure with numbness and tingling to L shoulder. In ER EKG concerning for VERO in lateral leads with elevated cardiac enzymes. Patient was taken for urgent cardiac cath and MINDI X 1 was placed to 100% pLAD lesion. Transferred to CCU in stable condition. Based on presentation, LHC findings, and h/o fibromyalgia there is a concern for SCAD (sudden coronary artery dissection). Workup with carotid dopplers and abd/pelvic ultrasound for renal artery assessment is pending. Continue treatment with DAPT, atorvastatin, and metoprolol. Echo with mild to moderate LV dysfunction and swirling noted in LV suggestive of possible LV thrombus. Heparin gtt started and followup limited echo with definity is pending on 4/1. Low dose lisinopril was added with resulting hypotension but asymptomatic. Also with mild TATE with elevated Scr and lisinopril was discontinued. 53F with h/o asthma and fibromyalgia presents with sudden onset chest pressure with numbness and tingling to L shoulder. In ER EKG concerning for VERO in lateral leads with elevated cardiac enzymes. Patient was taken for urgent cardiac cath and MINDI X 1 was placed to 100% pLAD lesion. Transferred to CCU in stable condition. Based on presentation, LHC findings, and h/o fibromyalgia there is a concern for SCAD (sudden coronary artery dissection). Workup with carotid dopplers and abd/pelvic ultrasound for renal artery assessment is pending. Continue treatment with DAPT, atorvastatin, and metoprolol. Echo with mild to moderate LV dysfunction and swirling noted in LV suggestive of possible LV thrombus. Heparin gtt started and followup limited echo with definity is pending on 4/1. Low dose lisinopril was added with resulting hypotension but asymptomatic. Also with mild TATE with elevated Scr post lisinopril initiation. ACE was discontinued. 53F with h/o asthma and fibromyalgia presents with sudden onset chest pressure with numbness and tingling to L shoulder. In ER EKG concerning for VERO in lateral leads with elevated cardiac enzymes. Patient was taken for urgent cardiac cath and MINDI X 1 was placed to 100% pLAD lesion. Transferred to CCU in stable condition. Based on presentation, C findings, and h/o fibromyalgia there is a concern for SCAD (sudden coronary artery dissection). Workup with abd/pelvic ultrasound for renal artery assessment was inconclusive x2, Carotid u/s with minimal occlusive heterogenous plaques. Continue treatment with DAPT, atorvastatin, and metoprolol. Echo with mild to moderate LV dysfunction and swirling noted in LV suggestive of possible LV thrombus. Heparin gtt started and followup limited echo with definity continued to show swirling, pt to be discharged on lovenox . Low dose lisinopril was added with resulting hypotension, therefore it was not given.  ACE was discontinued. Pt will be discharged on BB, Dual anti-platelets, stain, lovenox, warfarin.

## 2019-03-29 NOTE — DISCHARGE NOTE PROVIDER - NSDCCPCAREPLAN_GEN_ALL_CORE_FT
PRINCIPAL DISCHARGE DIAGNOSIS  Diagnosis: STEMI (ST elevation myocardial infarction)  Assessment and Plan of Treatment: PRINCIPAL DISCHARGE DIAGNOSIS  Diagnosis: STEMI (ST elevation myocardial infarction)  Assessment and Plan of Treatment: please continue with your medications as prescribed and call to make a f/u appt this week.

## 2019-03-30 LAB
ANION GAP SERPL CALC-SCNC: 17 MMO/L — HIGH (ref 7–14)
APTT BLD: 102.4 SEC — HIGH (ref 27.5–36.3)
BUN SERPL-MCNC: 9 MG/DL — SIGNIFICANT CHANGE UP (ref 7–23)
CALCIUM SERPL-MCNC: 9 MG/DL — SIGNIFICANT CHANGE UP (ref 8.4–10.5)
CHLORIDE SERPL-SCNC: 100 MMOL/L — SIGNIFICANT CHANGE UP (ref 98–107)
CK MB BLD-MCNC: 3.1 — HIGH (ref 0–2.5)
CK MB BLD-MCNC: 32.52 NG/ML — HIGH (ref 1–4.7)
CK SERPL-CCNC: 1058 U/L — HIGH (ref 25–170)
CO2 SERPL-SCNC: 24 MMOL/L — SIGNIFICANT CHANGE UP (ref 22–31)
CREAT SERPL-MCNC: 0.84 MG/DL — SIGNIFICANT CHANGE UP (ref 0.5–1.3)
GLUCOSE SERPL-MCNC: 97 MG/DL — SIGNIFICANT CHANGE UP (ref 70–99)
HBA1C BLD-MCNC: 5.4 % — SIGNIFICANT CHANGE UP (ref 4–5.6)
HCT VFR BLD CALC: 44.4 % — SIGNIFICANT CHANGE UP (ref 34.5–45)
HGB BLD-MCNC: 13.7 G/DL — SIGNIFICANT CHANGE UP (ref 11.5–15.5)
MAGNESIUM SERPL-MCNC: 2 MG/DL — SIGNIFICANT CHANGE UP (ref 1.6–2.6)
MCHC RBC-ENTMCNC: 25.9 PG — LOW (ref 27–34)
MCHC RBC-ENTMCNC: 30.9 % — LOW (ref 32–36)
MCV RBC AUTO: 83.9 FL — SIGNIFICANT CHANGE UP (ref 80–100)
NRBC # FLD: 0 K/UL — SIGNIFICANT CHANGE UP (ref 0–0)
PHOSPHATE SERPL-MCNC: 3.4 MG/DL — SIGNIFICANT CHANGE UP (ref 2.5–4.5)
PLATELET # BLD AUTO: 242 K/UL — SIGNIFICANT CHANGE UP (ref 150–400)
PMV BLD: 11.5 FL — SIGNIFICANT CHANGE UP (ref 7–13)
POTASSIUM SERPL-MCNC: 3.3 MMOL/L — LOW (ref 3.5–5.3)
POTASSIUM SERPL-SCNC: 3.3 MMOL/L — LOW (ref 3.5–5.3)
RBC # BLD: 5.29 M/UL — HIGH (ref 3.8–5.2)
RBC # FLD: 14.6 % — HIGH (ref 10.3–14.5)
SODIUM SERPL-SCNC: 141 MMOL/L — SIGNIFICANT CHANGE UP (ref 135–145)
WBC # BLD: 6.48 K/UL — SIGNIFICANT CHANGE UP (ref 3.8–10.5)
WBC # FLD AUTO: 6.48 K/UL — SIGNIFICANT CHANGE UP (ref 3.8–10.5)

## 2019-03-30 PROCEDURE — 93306 TTE W/DOPPLER COMPLETE: CPT | Mod: 26

## 2019-03-30 PROCEDURE — 99233 SBSQ HOSP IP/OBS HIGH 50: CPT | Mod: GC

## 2019-03-30 RX ORDER — BENZOCAINE AND MENTHOL 5; 1 G/100ML; G/100ML
1 LIQUID ORAL ONCE
Qty: 0 | Refills: 0 | Status: COMPLETED | OUTPATIENT
Start: 2019-03-30 | End: 2019-03-30

## 2019-03-30 RX ORDER — LISINOPRIL 2.5 MG/1
5 TABLET ORAL DAILY
Qty: 0 | Refills: 0 | Status: DISCONTINUED | OUTPATIENT
Start: 2019-03-30 | End: 2019-03-31

## 2019-03-30 RX ORDER — HEPARIN SODIUM 5000 [USP'U]/ML
15 INJECTION INTRAVENOUS; SUBCUTANEOUS
Qty: 25000 | Refills: 0 | Status: DISCONTINUED | OUTPATIENT
Start: 2019-03-30 | End: 2019-03-31

## 2019-03-30 RX ORDER — BENZOCAINE AND MENTHOL 5; 1 G/100ML; G/100ML
1 LIQUID ORAL ONCE
Qty: 0 | Refills: 0 | Status: DISCONTINUED | OUTPATIENT
Start: 2019-03-30 | End: 2019-03-30

## 2019-03-30 RX ADMIN — BUDESONIDE AND FORMOTEROL FUMARATE DIHYDRATE 2 PUFF(S): 160; 4.5 AEROSOL RESPIRATORY (INHALATION) at 21:45

## 2019-03-30 RX ADMIN — CHLORHEXIDINE GLUCONATE 1 APPLICATION(S): 213 SOLUTION TOPICAL at 11:23

## 2019-03-30 RX ADMIN — LISINOPRIL 5 MILLIGRAM(S): 2.5 TABLET ORAL at 17:53

## 2019-03-30 RX ADMIN — BUDESONIDE AND FORMOTEROL FUMARATE DIHYDRATE 2 PUFF(S): 160; 4.5 AEROSOL RESPIRATORY (INHALATION) at 10:19

## 2019-03-30 RX ADMIN — Medication 81 MILLIGRAM(S): at 11:18

## 2019-03-30 RX ADMIN — CYCLOBENZAPRINE HYDROCHLORIDE 10 MILLIGRAM(S): 10 TABLET, FILM COATED ORAL at 11:19

## 2019-03-30 RX ADMIN — HEPARIN SODIUM 15 UNIT(S)/HR: 5000 INJECTION INTRAVENOUS; SUBCUTANEOUS at 15:06

## 2019-03-30 RX ADMIN — HEPARIN SODIUM 5000 UNIT(S): 5000 INJECTION INTRAVENOUS; SUBCUTANEOUS at 05:07

## 2019-03-30 RX ADMIN — CLOPIDOGREL BISULFATE 75 MILLIGRAM(S): 75 TABLET, FILM COATED ORAL at 11:19

## 2019-03-30 RX ADMIN — HEPARIN SODIUM 13 UNIT(S)/HR: 5000 INJECTION INTRAVENOUS; SUBCUTANEOUS at 21:56

## 2019-03-30 RX ADMIN — DULOXETINE HYDROCHLORIDE 30 MILLIGRAM(S): 30 CAPSULE, DELAYED RELEASE ORAL at 11:19

## 2019-03-30 RX ADMIN — MONTELUKAST 10 MILLIGRAM(S): 4 TABLET, CHEWABLE ORAL at 11:21

## 2019-03-30 RX ADMIN — Medication 12.5 MILLIGRAM(S): at 17:53

## 2019-03-30 RX ADMIN — ZOLPIDEM TARTRATE 10 MILLIGRAM(S): 10 TABLET ORAL at 00:06

## 2019-03-30 RX ADMIN — BENZOCAINE AND MENTHOL 1 LOZENGE: 5; 1 LIQUID ORAL at 05:07

## 2019-03-30 RX ADMIN — ATORVASTATIN CALCIUM 80 MILLIGRAM(S): 80 TABLET, FILM COATED ORAL at 21:56

## 2019-03-30 RX ADMIN — Medication 12.5 MILLIGRAM(S): at 05:07

## 2019-03-30 NOTE — PROGRESS NOTE ADULT - ASSESSMENT
52yo F h/o asthma, fibromyalgia , p/w substernal  9/10 chest  pressure with numbness and tingling to  left shoulder and neck found to have STEMI s/p stent to 100% pLAD. pt with VSS, concerned for Fibromuscular dysplasia w' spontaneous coronary artery dissection, s/p duplex ( carotids and renal), carotids ( minimal heterogenous plaques), renal ( not well visualzed), for TTE today.    #Neuro- fibromyalgia  - A&O  - c/w duloxetine  - c/w cyclobenzaprine  - PRN- zolpidem    #Resp: asthma  - RONNY  - satting well RA  - Symbicort BID  - Montelukast QD  - PRN albuterol    # CV- s/p stent to LAD for STEMI  - TTE today  - Trend cardiac enzymes  - c/w ASA and Plavix  - c/w Statin  - concern for SCAD- Dopplers ( renal-limited 2/2 to bowel gas), carotids ( b/l minimal heterogenously plaques- non-occlusive)     #GI  - DASH/TLC    #  - trend Bun/cr  - trend UO  - RONNY    Endo  - TSH - wnl  - f/u A1C    Heme:  - c/w ASA/Plavix  - HSQ- Dvt ppx  - c/w statin    #ID  - RONNY 52yo F h/o asthma, fibromyalgia , p/w substernal  9/10 chest  pressure with numbness and tingling to  left shoulder and neck found to have STEMI s/p stent to 100% pLAD. pt with VSS, concerned for Fibromuscular dysplasia w' spontaneous coronary artery dissection, s/p duplex ( carotids and renal), carotids ( minimal heterogenous plaques), renal ( not well visualzed), for TTE today.    #Neuro- fibromyalgia  - A&O  - c/w duloxetine  - c/w cyclobenzaprine  - PRN- zolpidem    #Resp: asthma  - RONNY  - satting well RA  - Symbicort BID  - Montelukast QD  - PRN albuterol    # CV- s/p stent to LAD for STEMI  - TTE today  - Trend cardiac enzymes  - c/w ASA and Plavix  - c/w Statin  - concern for SCAD- Dopplers ( renal-limited 2/2 to bowel gas), carotids ( b/l minimal heterogenously plaques- non-occlusive)     #GI  - DASH/TLC    #  - trend Bun/cr  - trend UO  - RONNY    Endo  - RONNY  - TSH - wnl  - A1C- 5.4    Heme:  - c/w ASA/Plavix  - HSQ- Dvt ppx  - c/w statin    #ID  - RONNY 52yo F h/o asthma, fibromyalgia , p/w substernal  9/10 chest  pressure with numbness and tingling to  left shoulder and neck found to have STEMI s/p stent to 100% pLAD. pt with VSS, concerned for Fibromuscular dysplasia w' spontaneous coronary artery dissection, s/p duplex ( carotids and renal), carotids ( minimal heterogenous plaques), renal ( not well visualzed), for TTE today. clinically stable.     #Neuro- fibromyalgia  - A&O  - c/w duloxetine  - c/w cyclobenzaprine  - PRN- zolpidem    #Resp: asthma  - RONNY  - satting well RA  - Symbicort BID  - Montelukast QD  - PRN albuterol    # CV- s/p stent to LAD for STEMI  - TTE today  - Trend cardiac enzymes  - c/w ASA and Plavix  - c/w Statin  - concern for SCAD- Dopplers ( renal-limited 2/2 to bowel gas), carotids ( b/l minimal heterogenously plaques- non-occlusive)   - Started lisinipril-5    #GI  - DASH/TLC-   - NPO at midnight today ( repeat [facility] duplex-sunday)    #  - trend Bun/cr  - trend UO  - RONNY    Endo  - RONNY  - TSH - wnl  - A1C- 5.4    Heme:  - c/w ASA/Plavix  - HSQ- Dvt ppx  - c/w statin    #ID  - RONNY

## 2019-03-31 DIAGNOSIS — I25.42 CORONARY ARTERY DISSECTION: ICD-10-CM

## 2019-03-31 DIAGNOSIS — I51.3 INTRACARDIAC THROMBOSIS, NOT ELSEWHERE CLASSIFIED: ICD-10-CM

## 2019-03-31 LAB
ALBUMIN SERPL ELPH-MCNC: 4.1 G/DL — SIGNIFICANT CHANGE UP (ref 3.3–5)
ALP SERPL-CCNC: 121 U/L — HIGH (ref 40–120)
ALT FLD-CCNC: 33 U/L — SIGNIFICANT CHANGE UP (ref 4–33)
ANION GAP SERPL CALC-SCNC: 14 MMO/L — SIGNIFICANT CHANGE UP (ref 7–14)
APTT BLD: 101.4 SEC — HIGH (ref 27.5–36.3)
APTT BLD: 63 SEC — HIGH (ref 27.5–36.3)
APTT BLD: 78.4 SEC — HIGH (ref 27.5–36.3)
APTT BLD: 92 SEC — HIGH (ref 27.5–36.3)
AST SERPL-CCNC: 58 U/L — HIGH (ref 4–32)
BILIRUB SERPL-MCNC: 0.5 MG/DL — SIGNIFICANT CHANGE UP (ref 0.2–1.2)
BUN SERPL-MCNC: 24 MG/DL — HIGH (ref 7–23)
CALCIUM SERPL-MCNC: 9.2 MG/DL — SIGNIFICANT CHANGE UP (ref 8.4–10.5)
CHLORIDE SERPL-SCNC: 100 MMOL/L — SIGNIFICANT CHANGE UP (ref 98–107)
CO2 SERPL-SCNC: 24 MMOL/L — SIGNIFICANT CHANGE UP (ref 22–31)
CREAT SERPL-MCNC: 1.26 MG/DL — SIGNIFICANT CHANGE UP (ref 0.5–1.3)
GLUCOSE SERPL-MCNC: 121 MG/DL — HIGH (ref 70–99)
HCT VFR BLD CALC: 45.7 % — HIGH (ref 34.5–45)
HGB BLD-MCNC: 14.6 G/DL — SIGNIFICANT CHANGE UP (ref 11.5–15.5)
MAGNESIUM SERPL-MCNC: 2.2 MG/DL — SIGNIFICANT CHANGE UP (ref 1.6–2.6)
MCHC RBC-ENTMCNC: 26.1 PG — LOW (ref 27–34)
MCHC RBC-ENTMCNC: 31.9 % — LOW (ref 32–36)
MCV RBC AUTO: 81.6 FL — SIGNIFICANT CHANGE UP (ref 80–100)
NRBC # FLD: 0 K/UL — SIGNIFICANT CHANGE UP (ref 0–0)
PHOSPHATE SERPL-MCNC: 5 MG/DL — HIGH (ref 2.5–4.5)
PLATELET # BLD AUTO: 244 K/UL — SIGNIFICANT CHANGE UP (ref 150–400)
PMV BLD: 11.3 FL — SIGNIFICANT CHANGE UP (ref 7–13)
POTASSIUM SERPL-MCNC: 3.6 MMOL/L — SIGNIFICANT CHANGE UP (ref 3.5–5.3)
POTASSIUM SERPL-SCNC: 3.6 MMOL/L — SIGNIFICANT CHANGE UP (ref 3.5–5.3)
PROT SERPL-MCNC: 7.8 G/DL — SIGNIFICANT CHANGE UP (ref 6–8.3)
RBC # BLD: 5.6 M/UL — HIGH (ref 3.8–5.2)
RBC # FLD: 14.7 % — HIGH (ref 10.3–14.5)
SODIUM SERPL-SCNC: 138 MMOL/L — SIGNIFICANT CHANGE UP (ref 135–145)
WBC # BLD: 10.53 K/UL — HIGH (ref 3.8–10.5)
WBC # FLD AUTO: 10.53 K/UL — HIGH (ref 3.8–10.5)

## 2019-03-31 PROCEDURE — 99233 SBSQ HOSP IP/OBS HIGH 50: CPT | Mod: GC

## 2019-03-31 RX ORDER — HEPARIN SODIUM 5000 [USP'U]/ML
1100 INJECTION INTRAVENOUS; SUBCUTANEOUS
Qty: 25000 | Refills: 0 | Status: DISCONTINUED | OUTPATIENT
Start: 2019-03-31 | End: 2019-03-31

## 2019-03-31 RX ORDER — METOPROLOL TARTRATE 50 MG
12.5 TABLET ORAL
Qty: 0 | Refills: 0 | Status: DISCONTINUED | OUTPATIENT
Start: 2019-03-31 | End: 2019-04-01

## 2019-03-31 RX ORDER — POTASSIUM CHLORIDE 20 MEQ
40 PACKET (EA) ORAL ONCE
Qty: 0 | Refills: 0 | Status: COMPLETED | OUTPATIENT
Start: 2019-03-31 | End: 2019-03-31

## 2019-03-31 RX ORDER — HEPARIN SODIUM 5000 [USP'U]/ML
1000 INJECTION INTRAVENOUS; SUBCUTANEOUS
Qty: 25000 | Refills: 0 | Status: DISCONTINUED | OUTPATIENT
Start: 2019-03-31 | End: 2019-04-01

## 2019-03-31 RX ORDER — SODIUM CHLORIDE 0.65 %
1 AEROSOL, SPRAY (ML) NASAL
Qty: 0 | Refills: 0 | Status: DISCONTINUED | OUTPATIENT
Start: 2019-03-31 | End: 2019-04-01

## 2019-03-31 RX ADMIN — MONTELUKAST 10 MILLIGRAM(S): 4 TABLET, CHEWABLE ORAL at 12:06

## 2019-03-31 RX ADMIN — CYCLOBENZAPRINE HYDROCHLORIDE 10 MILLIGRAM(S): 10 TABLET, FILM COATED ORAL at 12:07

## 2019-03-31 RX ADMIN — Medication 81 MILLIGRAM(S): at 12:06

## 2019-03-31 RX ADMIN — CLOPIDOGREL BISULFATE 75 MILLIGRAM(S): 75 TABLET, FILM COATED ORAL at 12:06

## 2019-03-31 RX ADMIN — ATORVASTATIN CALCIUM 80 MILLIGRAM(S): 80 TABLET, FILM COATED ORAL at 22:02

## 2019-03-31 RX ADMIN — DULOXETINE HYDROCHLORIDE 30 MILLIGRAM(S): 30 CAPSULE, DELAYED RELEASE ORAL at 12:06

## 2019-03-31 RX ADMIN — HEPARIN SODIUM 10 UNIT(S)/HR: 5000 INJECTION INTRAVENOUS; SUBCUTANEOUS at 22:02

## 2019-03-31 RX ADMIN — BUDESONIDE AND FORMOTEROL FUMARATE DIHYDRATE 2 PUFF(S): 160; 4.5 AEROSOL RESPIRATORY (INHALATION) at 10:20

## 2019-03-31 RX ADMIN — Medication 40 MILLIEQUIVALENT(S): at 07:38

## 2019-03-31 RX ADMIN — BUDESONIDE AND FORMOTEROL FUMARATE DIHYDRATE 2 PUFF(S): 160; 4.5 AEROSOL RESPIRATORY (INHALATION) at 22:13

## 2019-03-31 RX ADMIN — CHLORHEXIDINE GLUCONATE 1 APPLICATION(S): 213 SOLUTION TOPICAL at 12:06

## 2019-03-31 RX ADMIN — Medication 12.5 MILLIGRAM(S): at 18:14

## 2019-03-31 NOTE — PROGRESS NOTE ADULT - PROBLEM SELECTOR PLAN 2
No active asthma exacerbation   c/w singular 10mg daily, Symbicort and ventolin as needed
Swirling in LV noted on ECHO. Heparin gtt started for suspicion for LV thrombus. Repeat ECHO 4/1/2019

## 2019-03-31 NOTE — CHART NOTE - NSCHARTNOTEFT_GEN_A_CORE
Patient reporting intermittent blood and clots from nose which she states started last night after heparin gtt initiated.  Heparin gtt is for suspected LV thrombus as evidenced by swirling noted in LV on ECHO. PTT was slightly elevated at 101 but not grossly supratherapeutic and dose was adjusted down. D/W Brooklyn Lewis and Arielle,  will hold Heparin gtt and re-check PTT. Allow to trend down and plan to restart at 4 PM. Continue to monitor for signs of any further bleeding. Patient reporting intermittent blood and clots from nose which she states started last night after heparin gtt initiated.  Heparin gtt is for suspected LV thrombus as evidenced by swirling noted in LV on ECHO. PTT was slightly elevated at 101 sec but not grossly supra-therapeutic and dose was adjusted down. D/W Brooklyn Lewis and Arielle,  will hold Heparin gtt and re-check PTT. Allow to trend down and plan to restart at 4 PM. Continue to monitor for signs of any further bleeding.

## 2019-03-31 NOTE — PROGRESS NOTE ADULT - PROBLEM SELECTOR PLAN 1
admit to CCU  Patient presented to ER with c/o chest pain. EKG revealed ST elevation- Initial set cardiac enzyme 200,trop 59  Given active chest pain and VERO, s/p urgent cath. ASA/Plavix and heparin loaded in ED  Cath showed 100% pLAD s/p stent  Assess for resolution of chest pain and for recurrent chest pain  Serial EKG to assess for resolution of ST changes  Monitor vital signs to monitor hemodynamic stability  Continuous cardiac monitoring to monitor for arrhythmias  CBC, CMP, coags, HbA1C, TSH, lipids for comorbidities, Trend cardiac enzymes  Aspirin 81 PO daily, Clopidogrel 75 PO daily, Lipitor 80 mg PO daily  Post cardiac cath care as per protocol.   Check radial site for hematoma or bleeding.  Introduce beta blockers as tolerated: Metoprolol 12.5 mg BID  Introduce ACE as tolerated. Hold for now due to recent dye load due to cardiac cath.  Low fat DASH diet  ECHO: 2D ECHO in 3-4 days to evaluate LV function
Continue DAPT, lipitor, and metoprolol. Lisinopril started and BP now soft with slightly elevated Scr. Will discontinue and monitor at present.

## 2019-03-31 NOTE — PROGRESS NOTE ADULT - PROBLEM SELECTOR PROBLEM 1
ST elevation myocardial infarction involving left anterior descending (LAD) coronary artery
ST elevation myocardial infarction involving left anterior descending (LAD) coronary artery

## 2019-03-31 NOTE — PROGRESS NOTE ADULT - PROBLEM SELECTOR PLAN 3
c/o b/l legs numbness  c/w duloxetine, Nucynta and cyclobenzaprine
Concern noted based on LHC and h/o fibromyalgia. Workup in progress, awaiting repeat renal U/S today.

## 2019-03-31 NOTE — PROGRESS NOTE ADULT - ASSESSMENT
53F with h/o asthma and fibromyalgia , p/w sudden  chest pressure to arm and neck -- + AWSTEMI now s/p stent to 100% pLAD. Based on [facility] also concern for SCAD (spontaneous coronary artery dissection) and Fibromuscular dysplasia, work up in progress. ECHO with swirling in LV and concern for LV thrombus on Heparin gtt.

## 2019-04-01 ENCOUNTER — TRANSCRIPTION ENCOUNTER (OUTPATIENT)
Age: 54
End: 2019-04-01

## 2019-04-01 VITALS — RESPIRATION RATE: 23 BRPM | HEART RATE: 88 BPM

## 2019-04-01 LAB
ANION GAP SERPL CALC-SCNC: 13 MMO/L — SIGNIFICANT CHANGE UP (ref 7–14)
APTT BLD: 40.7 SEC — HIGH (ref 27.5–36.3)
APTT BLD: 89.1 SEC — HIGH (ref 27.5–36.3)
BUN SERPL-MCNC: 22 MG/DL — SIGNIFICANT CHANGE UP (ref 7–23)
CALCIUM SERPL-MCNC: 9 MG/DL — SIGNIFICANT CHANGE UP (ref 8.4–10.5)
CHLORIDE SERPL-SCNC: 103 MMOL/L — SIGNIFICANT CHANGE UP (ref 98–107)
CO2 SERPL-SCNC: 25 MMOL/L — SIGNIFICANT CHANGE UP (ref 22–31)
CREAT SERPL-MCNC: 0.92 MG/DL — SIGNIFICANT CHANGE UP (ref 0.5–1.3)
GLUCOSE SERPL-MCNC: 106 MG/DL — HIGH (ref 70–99)
HCT VFR BLD CALC: 43.4 % — SIGNIFICANT CHANGE UP (ref 34.5–45)
HGB BLD-MCNC: 13.3 G/DL — SIGNIFICANT CHANGE UP (ref 11.5–15.5)
LACTATE SERPL-SCNC: 1.1 MMOL/L — SIGNIFICANT CHANGE UP (ref 0.5–2)
MAGNESIUM SERPL-MCNC: 2.2 MG/DL — SIGNIFICANT CHANGE UP (ref 1.6–2.6)
MCHC RBC-ENTMCNC: 25.9 PG — LOW (ref 27–34)
MCHC RBC-ENTMCNC: 30.6 % — LOW (ref 32–36)
MCV RBC AUTO: 84.4 FL — SIGNIFICANT CHANGE UP (ref 80–100)
NRBC # FLD: 0 K/UL — SIGNIFICANT CHANGE UP (ref 0–0)
PHOSPHATE SERPL-MCNC: 4.2 MG/DL — SIGNIFICANT CHANGE UP (ref 2.5–4.5)
PLATELET # BLD AUTO: 215 K/UL — SIGNIFICANT CHANGE UP (ref 150–400)
PMV BLD: 11.4 FL — SIGNIFICANT CHANGE UP (ref 7–13)
POTASSIUM SERPL-MCNC: 3.6 MMOL/L — SIGNIFICANT CHANGE UP (ref 3.5–5.3)
POTASSIUM SERPL-SCNC: 3.6 MMOL/L — SIGNIFICANT CHANGE UP (ref 3.5–5.3)
RBC # BLD: 5.14 M/UL — SIGNIFICANT CHANGE UP (ref 3.8–5.2)
RBC # FLD: 14.7 % — HIGH (ref 10.3–14.5)
SODIUM SERPL-SCNC: 141 MMOL/L — SIGNIFICANT CHANGE UP (ref 135–145)
WBC # BLD: 7.99 K/UL — SIGNIFICANT CHANGE UP (ref 3.8–10.5)
WBC # FLD AUTO: 7.99 K/UL — SIGNIFICANT CHANGE UP (ref 3.8–10.5)

## 2019-04-01 PROCEDURE — 99233 SBSQ HOSP IP/OBS HIGH 50: CPT

## 2019-04-01 PROCEDURE — 93306 TTE W/DOPPLER COMPLETE: CPT | Mod: 26

## 2019-04-01 RX ORDER — WARFARIN SODIUM 2.5 MG/1
10 TABLET ORAL ONCE
Qty: 0 | Refills: 0 | Status: COMPLETED | OUTPATIENT
Start: 2019-04-01 | End: 2019-04-01

## 2019-04-01 RX ORDER — ENOXAPARIN SODIUM 100 MG/ML
104 INJECTION SUBCUTANEOUS
Qty: 1040 | Refills: 0 | OUTPATIENT
Start: 2019-04-01 | End: 2019-04-05

## 2019-04-01 RX ORDER — METOPROLOL TARTRATE 50 MG
1 TABLET ORAL
Qty: 30 | Refills: 0
Start: 2019-04-01 | End: 2019-04-30

## 2019-04-01 RX ORDER — CLOPIDOGREL BISULFATE 75 MG/1
1 TABLET, FILM COATED ORAL
Qty: 0 | Refills: 0 | DISCHARGE
Start: 2019-04-01

## 2019-04-01 RX ORDER — ASPIRIN/CALCIUM CARB/MAGNESIUM 324 MG
1 TABLET ORAL
Qty: 30 | Refills: 0
Start: 2019-04-01 | End: 2019-04-30

## 2019-04-01 RX ORDER — CLOPIDOGREL BISULFATE 75 MG/1
1 TABLET, FILM COATED ORAL
Qty: 30 | Refills: 0
Start: 2019-04-01 | End: 2019-04-30

## 2019-04-01 RX ORDER — POTASSIUM CHLORIDE 20 MEQ
40 PACKET (EA) ORAL ONCE
Qty: 0 | Refills: 0 | Status: COMPLETED | OUTPATIENT
Start: 2019-04-01 | End: 2019-04-01

## 2019-04-01 RX ORDER — WARFARIN SODIUM 2.5 MG/1
1 TABLET ORAL
Qty: 30 | Refills: 0
Start: 2019-04-01 | End: 2019-04-30

## 2019-04-01 RX ORDER — ASPIRIN/CALCIUM CARB/MAGNESIUM 324 MG
1 TABLET ORAL
Qty: 0 | Refills: 0 | DISCHARGE
Start: 2019-04-01

## 2019-04-01 RX ORDER — ATORVASTATIN CALCIUM 80 MG/1
1 TABLET, FILM COATED ORAL
Qty: 0 | Refills: 0 | DISCHARGE
Start: 2019-04-01

## 2019-04-01 RX ORDER — ATORVASTATIN CALCIUM 80 MG/1
1 TABLET, FILM COATED ORAL
Qty: 30 | Refills: 0
Start: 2019-04-01 | End: 2019-04-30

## 2019-04-01 RX ORDER — ENOXAPARIN SODIUM 100 MG/ML
120 INJECTION SUBCUTANEOUS
Qty: 1680 | Refills: 0 | OUTPATIENT
Start: 2019-04-01 | End: 2019-04-07

## 2019-04-01 RX ORDER — ENOXAPARIN SODIUM 100 MG/ML
105 INJECTION SUBCUTANEOUS
Qty: 1470 | Refills: 0
Start: 2019-04-01 | End: 2019-04-07

## 2019-04-01 RX ORDER — ENOXAPARIN SODIUM 100 MG/ML
105 INJECTION SUBCUTANEOUS ONCE
Qty: 0 | Refills: 0 | Status: DISCONTINUED | OUTPATIENT
Start: 2019-04-01 | End: 2019-04-01

## 2019-04-01 RX ORDER — PANTOPRAZOLE SODIUM 20 MG/1
40 TABLET, DELAYED RELEASE ORAL
Qty: 0 | Refills: 0 | Status: DISCONTINUED | OUTPATIENT
Start: 2019-04-01 | End: 2019-04-01

## 2019-04-01 RX ADMIN — ZOLPIDEM TARTRATE 10 MILLIGRAM(S): 10 TABLET ORAL at 00:33

## 2019-04-01 RX ADMIN — MONTELUKAST 10 MILLIGRAM(S): 4 TABLET, CHEWABLE ORAL at 11:35

## 2019-04-01 RX ADMIN — HEPARIN SODIUM 11 UNIT(S)/HR: 5000 INJECTION INTRAVENOUS; SUBCUTANEOUS at 06:47

## 2019-04-01 RX ADMIN — Medication 12.5 MILLIGRAM(S): at 06:47

## 2019-04-01 RX ADMIN — CHLORHEXIDINE GLUCONATE 1 APPLICATION(S): 213 SOLUTION TOPICAL at 09:22

## 2019-04-01 RX ADMIN — Medication 40 MILLIEQUIVALENT(S): at 09:21

## 2019-04-01 RX ADMIN — Medication 81 MILLIGRAM(S): at 11:35

## 2019-04-01 RX ADMIN — WARFARIN SODIUM 10 MILLIGRAM(S): 2.5 TABLET ORAL at 17:55

## 2019-04-01 RX ADMIN — ENOXAPARIN SODIUM 120 MILLIGRAM(S): 100 INJECTION SUBCUTANEOUS at 18:56

## 2019-04-01 RX ADMIN — Medication 12.5 MILLIGRAM(S): at 17:55

## 2019-04-01 RX ADMIN — CLOPIDOGREL BISULFATE 75 MILLIGRAM(S): 75 TABLET, FILM COATED ORAL at 11:35

## 2019-04-01 RX ADMIN — PANTOPRAZOLE SODIUM 40 MILLIGRAM(S): 20 TABLET, DELAYED RELEASE ORAL at 06:47

## 2019-04-01 RX ADMIN — CYCLOBENZAPRINE HYDROCHLORIDE 10 MILLIGRAM(S): 10 TABLET, FILM COATED ORAL at 11:35

## 2019-04-01 RX ADMIN — BUDESONIDE AND FORMOTEROL FUMARATE DIHYDRATE 2 PUFF(S): 160; 4.5 AEROSOL RESPIRATORY (INHALATION) at 08:38

## 2019-04-01 RX ADMIN — DULOXETINE HYDROCHLORIDE 30 MILLIGRAM(S): 30 CAPSULE, DELAYED RELEASE ORAL at 11:35

## 2019-04-01 NOTE — PROGRESS NOTE ADULT - SUBJECTIVE AND OBJECTIVE BOX
Patient is a 53y old  Female who presents with a chief complaint of STEMI (29 Mar 2019 01:09)        SUBJECTIVE / OVERNIGHT EVENTS: Pt complaints of 5/10 pleuritic CP this am, denies:  SOB, N/V, fevers, Diarrhea, Headaches,      MEDICATIONS  (STANDING):  aspirin  chewable 81 milliGRAM(s) Oral daily  atorvastatin 80 milliGRAM(s) Oral at bedtime  buDESOnide 160 MICROgram(s)/formoterol 4.5 MICROgram(s) Inhaler 2 Puff(s) Inhalation two times a day  chlorhexidine 4% Liquid 1 Application(s) Topical <User Schedule>  clopidogrel Tablet 75 milliGRAM(s) Oral daily  cyclobenzaprine 10 milliGRAM(s) Oral daily  DULoxetine 30 milliGRAM(s) Oral daily  heparin  Injectable 5000 Unit(s) SubCutaneous every 12 hours  metoprolol tartrate 12.5 milliGRAM(s) Oral two times a day  montelukast 10 milliGRAM(s) Oral daily    MEDICATIONS  (PRN):  ALBUTerol    90 MICROgram(s) HFA Inhaler 2 Puff(s) Inhalation every 6 hours PRN Shortness of Breath and/or Wheezing  zolpidem 10 milliGRAM(s) Oral at bedtime PRN Insomnia      T(C): 36.8 (03-29-19 @ 08:00), Max: 36.8 (03-28-19 @ 23:48)  HR: 111 (03-29-19 @ 06:00) (70 - 111)  BP: 125/82 (03-29-19 @ 06:00) (100/71 - 152/89)  RR: 25 (03-29-19 @ 06:00) (13 - 25)  SpO2: 100% (03-29-19 @ 06:00) (97% - 100%)  CAPILLARY BLOOD GLUCOSE        I&O's Summary    28 Mar 2019 07:01  -  29 Mar 2019 07:00  --------------------------------------------------------  IN: 0 mL / OUT: 1100 mL / NET: -1100 mL        PHYSICAL EXAM  GENERAL: NAD, well-developed  HEAD:  Atraumatic, Normocephalic  CHEST/LUNG: Clear to auscultation bilaterally; No wheeze, rales or ronchi  HEART: normal S1 and S2; No murmurs, rubs, or gallops  ABDOMEN: Nontender, Nondistended; normalactive BS  EXTREMITIES:  no edema, no cyanosis  PSYCH: AAOx3      LABS:                        13.2   8.59  )-----------( 238      ( 29 Mar 2019 06:55 )             43.2     03-28    143  |  104  |  12  ----------------------------<  142<H>  4.0   |  25  |  0.79    Ca    9.1      28 Mar 2019 23:00    TPro  7.5  /  Alb  4.1  /  TBili  0.2  /  DBili  x   /  AST  26  /  ALT  20  /  AlkPhos  117  03-28    PT/INR - ( 28 Mar 2019 23:00 )   PT: 11.2 SEC;   INR: 0.98          PTT - ( 29 Mar 2019 06:55 )  PTT:31.2 SEC  CARDIAC MARKERS ( 28 Mar 2019 23:00 )  x     / x     / 200 u/L / 2.04 ng/mL / x              RADIOLOGY & ADDITIONAL TESTS:    Imaging Personally Reviewed:  Consultant(s) Notes Reviewed:    Care Discussed with Consultants/Other Providers:
Patient is a 53y old  Female who presents with a chief complaint of STEMI (29 Mar 2019 16:56)        SUBJECTIVE / OVERNIGHT EVENTS: no o/n events, Pt with no acute complaints, denies: CP, SOB, N/V, fevers, Diarrhea, Headaches,      MEDICATIONS  (STANDING):  aspirin  chewable 81 milliGRAM(s) Oral daily  atorvastatin 80 milliGRAM(s) Oral at bedtime  buDESOnide 160 MICROgram(s)/formoterol 4.5 MICROgram(s) Inhaler 2 Puff(s) Inhalation two times a day  chlorhexidine 4% Liquid 1 Application(s) Topical <User Schedule>  clopidogrel Tablet 75 milliGRAM(s) Oral daily  cyclobenzaprine 10 milliGRAM(s) Oral daily  DULoxetine 30 milliGRAM(s) Oral daily  heparin  Injectable 5000 Unit(s) SubCutaneous every 12 hours  metoprolol tartrate 12.5 milliGRAM(s) Oral two times a day  montelukast 10 milliGRAM(s) Oral daily    MEDICATIONS  (PRN):  ALBUTerol    90 MICROgram(s) HFA Inhaler 2 Puff(s) Inhalation every 6 hours PRN Shortness of Breath and/or Wheezing  zolpidem 10 milliGRAM(s) Oral at bedtime PRN Insomnia      T(C): 36.7 (03-30-19 @ 04:00), Max: 36.8 (03-29-19 @ 20:00)  HR: 72 (03-30-19 @ 08:00) (72 - 102)  BP: 122/84 (03-30-19 @ 08:00) (118/70 - 144/93)  RR: 20 (03-30-19 @ 08:00) (14 - 34)  SpO2: 99% (03-30-19 @ 08:00) (95% - 100%)  CAPILLARY BLOOD GLUCOSE        I&O's Summary    29 Mar 2019 07:01  -  30 Mar 2019 07:00  --------------------------------------------------------  IN: 200 mL / OUT: 750 mL / NET: -550 mL        PHYSICAL EXAM  GENERAL: NAD, well-developed  HEAD:  Atraumatic, Normocephalic  CHEST/LUNG: Clear to auscultation bilaterally; No wheeze, rales or ronchi  HEART: normal S1 and S2; No murmurs, rubs, or gallops  ABDOMEN: Nontender, Nondistended; normalactive BS  EXTREMITIES:  no edema, no cyanosis  PSYCH: AAOx3      LABS:                        13.7   6.48  )-----------( 242      ( 30 Mar 2019 05:00 )             44.4     03-29    142  |  103  |  9   ----------------------------<  98  3.7   |  26  |  0.71    Ca    9.1      29 Mar 2019 06:55  Mg     2.0     03-29    TPro  7.5  /  Alb  4.1  /  TBili  0.2  /  DBili  x   /  AST  26  /  ALT  20  /  AlkPhos  117  03-28    PT/INR - ( 28 Mar 2019 23:00 )   PT: 11.2 SEC;   INR: 0.98          PTT - ( 29 Mar 2019 06:55 )  PTT:31.2 SEC  CARDIAC MARKERS ( 29 Mar 2019 22:10 )  x     / x     / 1272 u/L / 59.05 ng/mL / x      CARDIAC MARKERS ( 29 Mar 2019 15:00 )  x     / x     / 1621 u/L / 135.00 ng/mL / x      CARDIAC MARKERS ( 29 Mar 2019 06:55 )  x     / x     / 1545 u/L / 179.70 ng/mL / x      CARDIAC MARKERS ( 28 Mar 2019 23:00 )  x     / x     / 200 u/L / 2.04 ng/mL / x              RADIOLOGY & ADDITIONAL TESTS:    Imaging Personally Reviewed:  Consultant(s) Notes Reviewed:    Care Discussed with Consultants/Other Providers:
Patient is a 53y old  Female who presents with a chief complaint of STEMI (31 Mar 2019 08:56)        SUBJECTIVE / OVERNIGHT EVENTS: Pt no acute complaints when examined at bedside, NPO for [facility] renal doppler, denies: CP, SOB, N/V, fevers, Diarrhea, Headaches,      MEDICATIONS  (STANDING):  aspirin  chewable 81 milliGRAM(s) Oral daily  atorvastatin 80 milliGRAM(s) Oral at bedtime  buDESOnide 160 MICROgram(s)/formoterol 4.5 MICROgram(s) Inhaler 2 Puff(s) Inhalation two times a day  chlorhexidine 4% Liquid 1 Application(s) Topical <User Schedule>  clopidogrel Tablet 75 milliGRAM(s) Oral daily  cyclobenzaprine 10 milliGRAM(s) Oral daily  DULoxetine 30 milliGRAM(s) Oral daily  heparin  Infusion 1000 Unit(s)/Hr (11 mL/Hr) IV Continuous <Continuous>  metoprolol tartrate 12.5 milliGRAM(s) Oral two times a day  montelukast 10 milliGRAM(s) Oral daily  pantoprazole    Tablet 40 milliGRAM(s) Oral before breakfast  potassium chloride    Tablet ER 40 milliEquivalent(s) Oral once    MEDICATIONS  (PRN):  ALBUTerol    90 MICROgram(s) HFA Inhaler 2 Puff(s) Inhalation every 6 hours PRN Shortness of Breath and/or Wheezing  sodium chloride 0.65% Nasal 1 Spray(s) Both Nostrils two times a day PRN Nasal Congestion  zolpidem 10 milliGRAM(s) Oral at bedtime PRN Insomnia      T(C): 37.1 (04-01-19 @ 04:00), Max: 37.1 (04-01-19 @ 04:00)  HR: 84 (04-01-19 @ 08:38) (76 - 134)  BP: 98/63 (04-01-19 @ 08:00) (85/69 - 122/80)  RR: 28 (04-01-19 @ 08:00) (11 - 28)  SpO2: 97% (04-01-19 @ 08:38) (93% - 97%)  CAPILLARY BLOOD GLUCOSE        I&O's Summary    31 Mar 2019 07:01  -  01 Apr 2019 07:00  --------------------------------------------------------  IN: 434 mL / OUT: 0 mL / NET: 434 mL        PHYSICAL EXAM  GENERAL: NAD, well-developed  HEAD:  Atraumatic, Normocephalic  CHEST/LUNG: Clear to auscultation bilaterally; No wheeze, rales or ronchi  HEART: normal S1 and S2; No murmurs, rubs, or gallops  ABDOMEN: Nontender, Nondistended; normalactive BS  EXTREMITIES:  no edema, no cyanosis  PSYCH: AAOx3      LABS:                        13.3   7.99  )-----------( 215      ( 01 Apr 2019 05:20 )             43.4     04-01    141  |  103  |  22  ----------------------------<  106<H>  3.6   |  25  |  0.92    Ca    9.0      01 Apr 2019 05:20  Phos  4.2     04-01  Mg     2.2     04-01    TPro  7.8  /  Alb  4.1  /  TBili  0.5  /  DBili  x   /  AST  58<H>  /  ALT  33  /  AlkPhos  121<H>  03-31    PTT - ( 01 Apr 2019 05:20 )  PTT:40.7 SEC          RADIOLOGY & ADDITIONAL TESTS:    Imaging Personally Reviewed:  Consultant(s) Notes Reviewed:    Care Discussed with Consultants/Other Providers:
Subjective/Objective: Patient resting in bed, no overnight events.     MEDICATIONS  (STANDING):  aspirin  chewable 81 milliGRAM(s) Oral daily  atorvastatin 80 milliGRAM(s) Oral at bedtime  buDESOnide 160 MICROgram(s)/formoterol 4.5 MICROgram(s) Inhaler 2 Puff(s) Inhalation two times a day  chlorhexidine 4% Liquid 1 Application(s) Topical <User Schedule>  clopidogrel Tablet 75 milliGRAM(s) Oral daily  cyclobenzaprine 10 milliGRAM(s) Oral daily  DULoxetine 30 milliGRAM(s) Oral daily  heparin  Infusion 15 Unit(s)/Hr (13 mL/Hr) IV Continuous <Continuous>  lisinopril 5 milliGRAM(s) Oral daily  metoprolol tartrate 12.5 milliGRAM(s) Oral two times a day  montelukast 10 milliGRAM(s) Oral daily    MEDICATIONS  (PRN):  ALBUTerol    90 MICROgram(s) HFA Inhaler 2 Puff(s) Inhalation every 6 hours PRN Shortness of Breath and/or Wheezing  sodium chloride 0.65% Nasal 1 Spray(s) Both Nostrils two times a day PRN Nasal Congestion  zolpidem 10 milliGRAM(s) Oral at bedtime PRN Insomnia          Vital Signs Last 24 Hrs  T(C): 36.4 (31 Mar 2019 08:00), Max: 36.9 (30 Mar 2019 15:22)  T(F): 97.6 (31 Mar 2019 08:00), Max: 98.5 (30 Mar 2019 15:22)  HR: 96 (31 Mar 2019 08:00) (77 - 124)  BP: 116/69 (31 Mar 2019 08:00) (87/43 - 130/59)  BP(mean): 80 (31 Mar 2019 08:00) (51 - 90)  RR: 21 (31 Mar 2019 08:00) (11 - 29)  SpO2: 95% (31 Mar 2019 08:00) (93% - 99%)  I&O's Detail    30 Mar 2019 07:01  -  31 Mar 2019 07:00  --------------------------------------------------------  IN:    heparin Infusion: 220 mL    Oral Fluid: 20 mL  Total IN: 240 mL    OUT:    Voided: 650 mL  Total OUT: 650 mL    Total NET: -410 mL    PHYSICAL EXAM  GEN:  RESP:  CV:  GI:  EXT:  NEURO:  PSYCH:    EKG/ TELEM: NSR    LABS:                          14.6   10.53 )-----------( 244      ( 31 Mar 2019 05:00 )             45.7     PTT - ( 31 Mar 2019 05:00 )  PTT:78.4 SEC  31 Mar 2019 05:00    138    |  100    |  24<H>  ----------------------------<  121<H>  3.6     |  24     |  1.26     30 Mar 2019 05:00    141    |  100    |  9      ----------------------------<  97     3.3<L>   |  24     |  0.84     Ca    9.2        31 Mar 2019 05:00  Ca    9.0        30 Mar 2019 05:00  Phos  5.0<H>     31 Mar 2019 05:00  Phos  3.4       30 Mar 2019 05:00  Mg     2.2       31 Mar 2019 05:00  Mg     2.0       30 Mar 2019 05:00    TPro  7.8    /  Alb  4.1    /  TBili  0.5    /  DBili  x      /  AST  58<H>  /  ALT  33     /  AlkPhos  121<H>  31 Mar 2019 05:00    CARDIAC MARKERS ( 30 Mar 2019 05:00 )  x     / x     / 1058 u/L / 32.52 ng/mL / x      CARDIAC MARKERS ( 29 Mar 2019 22:10 )  x     / x     / 1272 u/L / 59.05 ng/mL / x      CARDIAC MARKERS ( 29 Mar 2019 15:00 )  x     / x     / 1621 u/L / 135.00 ng/mL / x          Troponin T, High Sensitivity: 2450 ng/L (03-29-19 @ 22:10)  Troponin T, High Sensitivity: 3441 ng/L (03-29-19 @ 15:00)  Troponin T, High Sensitivity: 2803 ng/L (03-29-19 @ 06:55)    Creatine Kinase, Serum: 1058 u/L (03-30-19 @ 05:00)  Creatine Kinase, Serum: 1272 u/L (03-29-19 @ 22:10)  Creatine Kinase, Serum: 1621 u/L (03-29-19 @ 15:00)  Creatine Kinase, Serum: 1545 u/L (03-29-19 @ 06:55)  Creatine Kinase, Serum: 200 u/L (03-28-19 @ 23:00)    CKMB: 32.52 ng/mL (03-30-19 @ 05:00)  CKMB: 59.05 ng/mL (03-29-19 @ 22:10)  CKMB: 135.00 ng/mL (03-29-19 @ 15:00)  CKMB: 179.70 ng/mL (03-29-19 @ 06:55)  CKMB: 2.04 ng/mL (03-28-19 @ 23:00)      Hemoglobin A1C, Whole Blood: 5.4 % (03-30-19 @ 05:00)        Diagnostic testing:      Patient name: MATEO SALAZAR  YOB: 1965   Age: 53 (F)   MR#: [id number]  Study Date: 3/30/2019  [facility] ITALO [facility]  Study quality: Technically Fair  Referring Physician: Roberta Cronin MD  Blood Pressure: 116/68 mmHg  Height: 170 cm  Weight: 149 kg  BSA: 2.5 m2  ------------------------------------------------------------------------  PROCEDURE: Transthoracic echocardiogram with 2-D, M-Mode  and complete spectral and color flow Doppler.  Verbal consent was obtained for injection of echo contrast.  Following  intravenous injection of contrast, harmonic  imaging was performed.  INDICATION: Abnormal electrocardiogram (ECG) (EKG) (R94.31)  ------------------------------------------------------------------------  Dimensions:    Normal Values:  LA:     3.0    2.0 - 4.0 cm  Ao:     3.0    2.0 - 3.8 cm  SEPTUM: 0.9    0.6 - 1.2 cm  PWT:    1.0    0.6 - 1.1 cm  LVIDd:  4.3    3.0 - 5.6 cm  LVIDs:  2.7    1.8 - 4.0 cm  Derived variables:  LVMI: 53 g/m2  RWT: 0.46  Fractional short: 37 %  EF (Visual Estimate): 45 %  ------------------------------------------------------------------------  Observations:  Mitral Valve: Normal mitral valve. Minimal mitral  regurgitation.  Aortic Valve/Aorta: Normaltrileaflet aortic valve. No  aortic valve regurgitation seen.  Normal aortic root.  Left Atrium: Normal left atrium.  Left Ventricle: Endocardial visualization enhanced with  intravenous injection of echo contrast (Definity). Mild  segmental left ventricular systolic dysfunction. The distal  septal, distal inferior, and the apical cap are severely  hypokinetic. There is swirling of intravenous echo contrast  in the LV apex suggestive of a low-flow state. No  definitive left ventricular thrombus. Normal left  ventricular internal dimensions and wall thicknesses. Mild  diastolic dysfunction (Stage I).  Right Heart: Normal right atrium. Normal right ventricular  size and function. Normal tricuspid valve. Minimal  tricuspid regurgitation. Normal pulmonic valve. No pulmonic  regurgitation.  Pericardium/Pleura: Thickened pericardium with smal  pericardial effusion anterior to the right heart.  Hemodynamic: Estimated right ventricular systolic pressure  equals 26 mm Hg, assuming right atrial pressure equals 8 mm  Hg, consistent with normal pulmonary pressures.  ------------------------------------------------------------------------  Conclusions:  1. Normal mitral valve. Minimal mitral regurgitation.  2. Normal trileaflet aortic valve. No aortic valve  regurgitation seen.  3. Endocardial visualization enhanced with intravenous  injection of echo contrast (Definity). Mild segmental left  ventricular systolic dysfunction. The distal septal, distal  inferior, and the apical cap are severelyhypokinetic.  There is swirling of intravenous echo contrast in the LV  apex suggestive of a low-flow state. No definitive left  ventricular thrombus.  4. Mild diastolic dysfunction (Stage I).  5. Normal right ventricular size and function.  6. Thickened pericardium with smal pericardial effusion  anterior to the right heart.  *** No previous Echo exam.  ------------------------------------------------------------------------  Confirmed on  3/30/2019 - 13:17:34 by Yoana Cordova MD  ------------------------------------------------------------------------

## 2019-04-01 NOTE — CONSULT NOTE ADULT - SUBJECTIVE AND OBJECTIVE BOX
Patient seen and examined at bedside  endorses improved bleeding from nostrils    HPI: 54 yo F h/o asthma, fibromyalgia , p/w substernal  9/10 chest  pressure with numbness and tingling to  left shoulder and neck , started while watching TV around 9pm .No associated symptoms, never had similar symptoms in past. Deneis  shortness of breath,  cough,, fever. Suffer from chronic b/l leg pain and numbness. She took ASA 325mg PO at home and 81mg x2 tab form EMS.In Ed  EKG showed  diffuse VERO in I,II, V2-V6 and TWI in III,V1  which progressive got worsen on repeat EKGs. She continue to have chest pressure 7-8/10.She was loaded with Plavix 600mg Po and Heparin IV. Spoke to dr Shaw and cath team was activated .She underwent  left heart cath which revealed Distal  % stenosis and received one MINDI ,EF 55% (29 Mar 2019 01:09)      PAST MEDICAL & SURGICAL HISTORY:  Carpal Tunnel Syndrome  Asthma - never intubated  but hospitalized >5yrs ago  H/O: hysterectomy  S/P Oophorectomy right 2006  History of Dilatation and Curettage  left Knee  trauma  h/o repair 1985  History of Nasal Septoplasty and sinus surgery 2003      grass / pollen/ trees/ dust/ cats/ dogs------ sneezing/ wheezing/ itchy eyes/ itchy skin (Other)  No Known Drug Allergies       MEDICATIONS  (STANDING):  aspirin  chewable 81 milliGRAM(s) Oral daily  atorvastatin 80 milliGRAM(s) Oral at bedtime  buDESOnide 160 MICROgram(s)/formoterol 4.5 MICROgram(s) Inhaler 2 Puff(s) Inhalation two times a day  chlorhexidine 4% Liquid 1 Application(s) Topical <User Schedule>  clopidogrel Tablet 75 milliGRAM(s) Oral daily  cyclobenzaprine 10 milliGRAM(s) Oral daily  DULoxetine 30 milliGRAM(s) Oral daily  heparin  Infusion 1000 Unit(s)/Hr (11 mL/Hr) IV Continuous <Continuous>  metoprolol tartrate 12.5 milliGRAM(s) Oral two times a day  montelukast 10 milliGRAM(s) Oral daily  pantoprazole    Tablet 40 milliGRAM(s) Oral before breakfast  potassium chloride    Tablet ER 40 milliEquivalent(s) Oral once    MEDICATIONS  (PRN):  ALBUTerol    90 MICROgram(s) HFA Inhaler 2 Puff(s) Inhalation every 6 hours PRN Shortness of Breath and/or Wheezing  sodium chloride 0.65% Nasal 1 Spray(s) Both Nostrils two times a day PRN Nasal Congestion  zolpidem 10 milliGRAM(s) Oral at bedtime PRN Insomnia      REVIEW OF SYSTEMS:  CONSTITUTIONAL: (+) malaise.   EYES: No acute change in vision   ENT:  improved bleeding from nostrils. No tinnitus  NECK: No stiffness  RESPIRATORY: No hemoptysis  CARDIOVASCULAR: improved chest pain. no palpitations, syncope  GASTROINTESTINAL: No hematemesis, diarrhea, melena, or hematochezia.  GENITOURINARY: No hematuria  NEUROLOGICAL: No headaches  LYMPH Nodes: No enlarged glands  ENDOCRINE: No heat or cold intolerance	    T(C): 37.1 (04-01-19 @ 04:00), Max: 37.1 (04-01-19 @ 04:00)  HR: 84 (04-01-19 @ 08:38) (76 - 134)  BP: 98/63 (04-01-19 @ 08:00) (85/69 - 122/80)  RR: 28 (04-01-19 @ 08:00) (11 - 28)  SpO2: 97% (04-01-19 @ 08:38) (94% - 97%)    PHYSICAL EXAMINATION:   Constitutional: NAD  HEENT: NC, AT  Neck:  Supple  Respiratory:  Adequate airflow b/l. Not using accessory muscles of respiration.  Cardiovascular:  S1 & S2 intact, systolic murmur, no R/G, 2+ radial pulses b/l  Gastrointestinal: Soft, NT, ND, normoactive b.s., no organomegaly/RT/rigidity  Extremities: WWP  Neurological:  Alert and awake.  No acute focal motor deficits. Crude sensation intact.     Labs and imaging reviewed    LABS:                        13.3   7.99  )-----------( 215      ( 01 Apr 2019 05:20 )             43.4     04-01    141  |  103  |  22  ----------------------------<  106<H>  3.6   |  25  |  0.92    Ca    9.0      01 Apr 2019 05:20  Phos  4.2     04-01  Mg     2.2     04-01    TPro  7.8  /  Alb  4.1  /  TBili  0.5  /  DBili  x   /  AST  58<H>  /  ALT  33  /  AlkPhos  121<H>  03-31        PTT - ( 01 Apr 2019 05:20 )  PTT:40.7 SEC    CAPILLARY BLOOD GLUCOSE            LIVER FUNCTIONS - ( 31 Mar 2019 05:00 )  Alb: 4.1 g/dL / Pro: 7.8 g/dL / ALK PHOS: 121 u/L / ALT: 33 u/L / AST: 58 u/L / GGT: x               RADIOLOGY & ADDITIONAL STUDIES:

## 2019-04-01 NOTE — DISCHARGE NOTE NURSING/CASE MANAGEMENT/SOCIAL WORK - NSDCDPATPORTLINK_GEN_ALL_CORE
You can access the [facility] Patient Portal, offered by [facility], by registering with the following website: [email or web address]

## 2019-04-01 NOTE — CONSULT NOTE ADULT - ASSESSMENT
54 yo F with PMHx Asthma, Fibromyalgia, obesity who p/w substernal chest pressure with LUE paresthesias. Noted to have STEMI s/p PCI to LAD.  Elevated concern for Fibromuscular dysplasia w' spontaneous coronary artery dissection.   ->  Acute Coronary Syndrome:      - CCU management appreciated       - C/w heparin gtt, asa, plavix, statin, bb      - Repeat TTE      - Consider resuming ACEI as tolerated      - Tele      - Nutrition eval    -> Asthma:      - well controlled       - c/w pulm rx     -> Fibromyalgia:      - c/w duloxetine and cyclobenzaprine     -> Insomnia:      - c/w zolpidem     -> Obesity:      - lifestyle modifications, nutrition recs

## 2019-04-01 NOTE — PROGRESS NOTE ADULT - ASSESSMENT
52yo F h/o asthma, fibromyalgia , p/w substernal  9/10 chest  pressure with numbness and tingling to  left shoulder and neck found to have STEMI s/p stent to 100% pLAD. pt with VSS, concerned for Fibromuscular dysplasia w' spontaneous coronary artery dissection, s/p duplex ( carotids and renal), carotids ( minimal heterogenous plaques), renal ( not well visualzed), TTE- with swirling in LV- concern for LV thrombus- on hep GTT, for TTE today.    #Neuro- fibromyalgia  - A&O  - c/w duloxetine  - c/w cyclobenzaprine  - PRN- zolpidem    #Resp: asthma  - RONNY  - satting well RA  - Symbicort BID  - Montelukast QD  - PRN albuterol    # CV- s/p stent to LAD for STEMI  - TTE today- r/u LV thrombus ( previous TTE with swirling)  - on Hep- GTT for possible LV thrombus   - Trend cardiac enzymes  - c/w ASA and Plavix  - c/w Statin  - concern for SCAD- Dopplers ( renal-limited 2/2 to bowel gas), carotids ( b/l minimal heterogenously plaques- non-occlusive)   - on metoprolol  - holding ACE-I for soft pressures    #GI  - DASH/TLC-   - NPO at midnight today ( repeat [facility] duplex-sunday)    #  - trend Bun/cr  - trend UO  - RONNY    Endo  - RONNY  - TSH - wnl  - A1C- 5.4    Heme:  - c/w ASA/Plavix  - Hep-gtt- concern for LV thrombus  - c/w statin    #ID  - RONNY 52yo F h/o asthma, fibromyalgia , p/w substernal  9/10 chest  pressure with numbness and tingling to  left shoulder and neck found to have STEMI s/p stent to 100% pLAD. pt with VSS, concerned for Fibromuscular dysplasia w' spontaneous coronary artery dissection, s/p duplex ( carotids and renal), carotids ( minimal heterogenous plaques), renal ( not well visualzed), TTE- with swirling in LV- concern for LV thrombus- on hep GTT, for TTE today.    #Neuro- fibromyalgia  - A&O  - c/w duloxetine  - c/w cyclobenzaprine  - PRN- zolpidem    #Resp: asthma  - RONNY  - satting well RA  - Symbicort BID  - Montelukast QD  - PRN albuterol    # CV- s/p stent to LAD for STEMI  - TTE today- r/u LV thrombus ( previous TTE with swirling)  - repeat renal dopplers today-   - on Hep- GTT for possible LV thrombus   - Trend cardiac enzymes  - c/w ASA and Plavix  - c/w Statin  - concern for SCAD- Dopplers ( renal-limited 2/2 to bowel gas), carotids ( b/l minimal heterogenously plaques- non-occlusive)   - on metoprolol  - holding ACE-I for soft pressures    #GI  - Diet-DASH/TLC-   - Nutrition c/s for pt    #  - trend Bun/cr  - trend UO  - RONNY    Endo  - RONNY  - TSH - wnl  - A1C- 5.4    Heme:  - c/w ASA/Plavix  - Hep-gtt- concern for LV thrombus  - c/w statin    #ID  - RONNY

## 2019-04-01 NOTE — PROGRESS NOTE ADULT - ATTENDING COMMENTS
Patient seen and examined  Agree with above assessment and plan  Patient with AWMI  Will check TTE  Check Renal Doppler  DC planning
Jojo Day is a 53 year old woman with h/o asthma, fibromyalgia and carpal tunnel syndrome. She presented with substernal chest pressure radiating to neck and left shoulder due to acute anterior STEMI with 100% stenosis of pLAD. She underwent placement of Resolute Mauri drug-eluting stent on 3/29/19. TTE done 3/30/19 showed normal mitral valve, with minimal regurgitation. The aortic valve tiesha normal trileaflet, with no regurgitation. The aortic root was normal. The LA was normal. The LV was normal in size and wall thickness. The distal septal, distal inferior and apical cap were severely hypokinetic, with swirling of intravenous echo contrast in the LV apex, suggestive of low-flow state. There was no definitive LV thrombus. Overall, there was mild segmental LV systolic dysfunction with visual estimate of EF 45%. There was mild diastolic dysfunction (Stage I). The RA was normal. The RV was normal in size and function. The tricuspid and pulmonic valves were normal, with minimal TR. The pericardium appeared thickened, with small pericardial effusion anterior to the right heart. Estimated  RVSP 26 mm Hg, consistent with normal pulmonary pressures. Standing Regimen: aspirin  81  mg daily, atorvastatin 80 mg daily at bedtime, budesonide 160 mCg / formoterol 4.5 mCg Inhaler 2 Puffs Inhalation twice daily, clopidogrel (Plavix) 75 mg daily, cyclobenzaprine 10 mg daily, duloxetine 30 mg daily, metoprolol tartrate 12.5 mg twice daily, montelukast 10 mg daily. Heparin 1500 U/Hr started 3/30/19 given risk of LV thrombus. TTE will be repeated
Jojo Day is a 53 year old woman with h/o asthma, fibromyalgia and carpal tunnel syndrome. She presented with substernal chest pressure radiating to neck and left shoulder due to acute anterior STEMI with 100% stenosis of pLAD. She underwent placement of Resolute Mauri drug-eluting stent on 3/29/19. Of note, based on appearance of coronary stenosis, there was concern for spontaneous coronary artery dissection (SCAD) and Fibromuscular dysplasia.  Carotid Duplex ultrasound done 3/29/19 showed no hemodynamically significant carotid stenosis. TTE done 3/30/19 showed normal mitral valve, with minimal regurgitation. The aortic valve was normal trileaflet, with no regurgitation. The aortic root was normal. The LA was normal. The LV was normal in size and wall thickness. The distal septal, distal inferior and apical cap were severely hypokinetic, with swirling of intravenous echo contrast in the LV apex, suggestive of low-flow state. There was no definitive LV thrombus. Overall, there was mild segmental LV systolic dysfunction with visual estimate of EF 45%. There was mild diastolic dysfunction (Stage I). The RA was normal. The RV was normal in size and function. The tricuspid and pulmonic valves were normal, with minimal TR. The pericardium appeared thickened, with small pericardial effusion anterior to the right heart. Estimated  RVSP 26 mm Hg, consistent with normal pulmonary pressures. Standing Regimen: aspirin  81  mg daily, atorvastatin 80 mg daily at bedtime, budesonide 160 mCg / formoterol 4.5 mCg Inhaler 2 Puffs Inhalation twice daily, clopidogrel (Plavix) 75 mg daily, cyclobenzaprine 10 mg daily, duloxetine 30 mg daily, metoprolol tartrate 12.5 mg twice daily, montelukast 10 mg daily. Heparin 1500 U/Hr started 3/30/19 given risk of LV thrombus. TTE will be repeated on 4/1/19. Renal Duplex ultrasound is pending
Patient seen and examined. Agree with above assessment and plan  AWMI with PCI LAD  Doing well overnight  Will need eventual TTE  Continue DAPT and statin

## 2019-04-04 ENCOUNTER — EMERGENCY (EMERGENCY)
Facility: HOSPITAL | Age: 54
LOS: 1 days | Discharge: HOME | End: 2019-04-04
Attending: EMERGENCY MEDICINE | Admitting: EMERGENCY MEDICINE
Payer: COMMERCIAL

## 2019-04-04 VITALS
SYSTOLIC BLOOD PRESSURE: 121 MMHG | OXYGEN SATURATION: 98 % | HEART RATE: 98 BPM | DIASTOLIC BLOOD PRESSURE: 79 MMHG | TEMPERATURE: 98 F | RESPIRATION RATE: 17 BRPM

## 2019-04-04 DIAGNOSIS — Z90.710 ACQUIRED ABSENCE OF BOTH CERVIX AND UTERUS: Chronic | ICD-10-CM

## 2019-04-04 LAB
ALBUMIN SERPL ELPH-MCNC: 4 G/DL — SIGNIFICANT CHANGE UP (ref 3.3–5)
ALP SERPL-CCNC: 114 U/L — SIGNIFICANT CHANGE UP (ref 40–120)
ALT FLD-CCNC: 47 U/L — HIGH (ref 4–33)
ANION GAP SERPL CALC-SCNC: 13 MMO/L — SIGNIFICANT CHANGE UP (ref 7–14)
APPEARANCE UR: SIGNIFICANT CHANGE UP
APTT BLD: 42.5 SEC — HIGH (ref 27.5–36.3)
AST SERPL-CCNC: 50 U/L — HIGH (ref 4–32)
BASOPHILS # BLD AUTO: 0.04 K/UL — SIGNIFICANT CHANGE UP (ref 0–0.2)
BASOPHILS NFR BLD AUTO: 0.8 % — SIGNIFICANT CHANGE UP (ref 0–2)
BILIRUB SERPL-MCNC: 0.3 MG/DL — SIGNIFICANT CHANGE UP (ref 0.2–1.2)
BILIRUB UR-MCNC: NEGATIVE — SIGNIFICANT CHANGE UP
BLOOD UR QL VISUAL: HIGH
BUN SERPL-MCNC: 14 MG/DL — SIGNIFICANT CHANGE UP (ref 7–23)
CALCIUM SERPL-MCNC: 9 MG/DL — SIGNIFICANT CHANGE UP (ref 8.4–10.5)
CHLORIDE SERPL-SCNC: 102 MMOL/L — SIGNIFICANT CHANGE UP (ref 98–107)
CO2 SERPL-SCNC: 24 MMOL/L — SIGNIFICANT CHANGE UP (ref 22–31)
COLOR SPEC: SIGNIFICANT CHANGE UP
CREAT SERPL-MCNC: 0.94 MG/DL — SIGNIFICANT CHANGE UP (ref 0.5–1.3)
EOSINOPHIL # BLD AUTO: 0.21 K/UL — SIGNIFICANT CHANGE UP (ref 0–0.5)
EOSINOPHIL NFR BLD AUTO: 4.2 % — SIGNIFICANT CHANGE UP (ref 0–6)
GLUCOSE SERPL-MCNC: 93 MG/DL — SIGNIFICANT CHANGE UP (ref 70–99)
GLUCOSE UR-MCNC: NEGATIVE — SIGNIFICANT CHANGE UP
HCT VFR BLD CALC: 45.2 % — HIGH (ref 34.5–45)
HGB BLD-MCNC: 13.8 G/DL — SIGNIFICANT CHANGE UP (ref 11.5–15.5)
IMM GRANULOCYTES NFR BLD AUTO: 0.4 % — SIGNIFICANT CHANGE UP (ref 0–1.5)
INR BLD: 1.41 — HIGH (ref 0.88–1.17)
KETONES UR-MCNC: NEGATIVE — SIGNIFICANT CHANGE UP
LEUKOCYTE ESTERASE UR-ACNC: SIGNIFICANT CHANGE UP
LYMPHOCYTES # BLD AUTO: 1.48 K/UL — SIGNIFICANT CHANGE UP (ref 1–3.3)
LYMPHOCYTES # BLD AUTO: 29.3 % — SIGNIFICANT CHANGE UP (ref 13–44)
MCHC RBC-ENTMCNC: 25.7 PG — LOW (ref 27–34)
MCHC RBC-ENTMCNC: 30.5 % — LOW (ref 32–36)
MCV RBC AUTO: 84.2 FL — SIGNIFICANT CHANGE UP (ref 80–100)
MONOCYTES # BLD AUTO: 1.14 K/UL — HIGH (ref 0–0.9)
MONOCYTES NFR BLD AUTO: 22.6 % — HIGH (ref 2–14)
NEUTROPHILS # BLD AUTO: 2.16 K/UL — SIGNIFICANT CHANGE UP (ref 1.8–7.4)
NEUTROPHILS NFR BLD AUTO: 42.7 % — LOW (ref 43–77)
NITRITE UR-MCNC: NEGATIVE — SIGNIFICANT CHANGE UP
NRBC # FLD: 0 K/UL — SIGNIFICANT CHANGE UP (ref 0–0)
PH UR: 6 — SIGNIFICANT CHANGE UP (ref 5–8)
PLATELET # BLD AUTO: 206 K/UL — SIGNIFICANT CHANGE UP (ref 150–400)
PMV BLD: 12 FL — SIGNIFICANT CHANGE UP (ref 7–13)
POTASSIUM SERPL-MCNC: 4.2 MMOL/L — SIGNIFICANT CHANGE UP (ref 3.5–5.3)
POTASSIUM SERPL-SCNC: 4.2 MMOL/L — SIGNIFICANT CHANGE UP (ref 3.5–5.3)
PROT SERPL-MCNC: 7.4 G/DL — SIGNIFICANT CHANGE UP (ref 6–8.3)
PROT UR-MCNC: 100 — HIGH
PROTHROM AB SERPL-ACNC: 16.3 SEC — HIGH (ref 9.8–13.1)
RBC # BLD: 5.37 M/UL — HIGH (ref 3.8–5.2)
RBC # FLD: 14.6 % — HIGH (ref 10.3–14.5)
RBC CASTS # UR COMP ASSIST: SIGNIFICANT CHANGE UP (ref 0–?)
SODIUM SERPL-SCNC: 139 MMOL/L — SIGNIFICANT CHANGE UP (ref 135–145)
SP GR SPEC: 1.03 — SIGNIFICANT CHANGE UP (ref 1–1.04)
UROBILINOGEN FLD QL: NORMAL — SIGNIFICANT CHANGE UP
WBC # BLD: 5.05 K/UL — SIGNIFICANT CHANGE UP (ref 3.8–10.5)
WBC # FLD AUTO: 5.05 K/UL — SIGNIFICANT CHANGE UP (ref 3.8–10.5)
WBC UR QL: SIGNIFICANT CHANGE UP (ref 0–?)

## 2019-04-04 PROCEDURE — 99218: CPT

## 2019-04-04 RX ORDER — METOPROLOL TARTRATE 50 MG
25 TABLET ORAL DAILY
Qty: 0 | Refills: 0 | Status: DISCONTINUED | OUTPATIENT
Start: 2019-04-04 | End: 2019-04-08

## 2019-04-04 RX ORDER — CLOPIDOGREL BISULFATE 75 MG/1
75 TABLET, FILM COATED ORAL DAILY
Qty: 0 | Refills: 0 | Status: DISCONTINUED | OUTPATIENT
Start: 2019-04-04 | End: 2019-04-08

## 2019-04-04 RX ORDER — ZOLPIDEM TARTRATE 10 MG/1
5 TABLET ORAL AT BEDTIME
Qty: 0 | Refills: 0 | Status: DISCONTINUED | OUTPATIENT
Start: 2019-04-04 | End: 2019-04-05

## 2019-04-04 RX ORDER — ZOLPIDEM TARTRATE 10 MG/1
5 TABLET ORAL AT BEDTIME
Qty: 0 | Refills: 0 | Status: DISCONTINUED | OUTPATIENT
Start: 2019-04-04 | End: 2019-04-04

## 2019-04-04 RX ORDER — ATORVASTATIN CALCIUM 80 MG/1
80 TABLET, FILM COATED ORAL AT BEDTIME
Qty: 0 | Refills: 0 | Status: DISCONTINUED | OUTPATIENT
Start: 2019-04-04 | End: 2019-04-08

## 2019-04-04 RX ORDER — DULOXETINE HYDROCHLORIDE 30 MG/1
30 CAPSULE, DELAYED RELEASE ORAL DAILY
Qty: 0 | Refills: 0 | Status: DISCONTINUED | OUTPATIENT
Start: 2019-04-04 | End: 2019-04-08

## 2019-04-04 RX ORDER — ALBUTEROL 90 UG/1
2 AEROSOL, METERED ORAL EVERY 6 HOURS
Qty: 0 | Refills: 0 | Status: DISCONTINUED | OUTPATIENT
Start: 2019-04-04 | End: 2019-04-08

## 2019-04-04 RX ORDER — ENOXAPARIN SODIUM 100 MG/ML
105 INJECTION SUBCUTANEOUS
Qty: 0 | Refills: 0 | Status: DISCONTINUED | OUTPATIENT
Start: 2019-04-04 | End: 2019-04-05

## 2019-04-04 RX ORDER — ASPIRIN/CALCIUM CARB/MAGNESIUM 324 MG
81 TABLET ORAL DAILY
Qty: 0 | Refills: 0 | Status: DISCONTINUED | OUTPATIENT
Start: 2019-04-04 | End: 2019-04-08

## 2019-04-04 RX ORDER — CYCLOBENZAPRINE HYDROCHLORIDE 10 MG/1
10 TABLET, FILM COATED ORAL ONCE
Qty: 0 | Refills: 0 | Status: COMPLETED | OUTPATIENT
Start: 2019-04-04 | End: 2019-04-04

## 2019-04-04 RX ORDER — BUDESONIDE AND FORMOTEROL FUMARATE DIHYDRATE 160; 4.5 UG/1; UG/1
2 AEROSOL RESPIRATORY (INHALATION)
Qty: 0 | Refills: 0 | Status: DISCONTINUED | OUTPATIENT
Start: 2019-04-04 | End: 2019-04-08

## 2019-04-04 RX ORDER — SODIUM CHLORIDE 9 MG/ML
1000 INJECTION INTRAMUSCULAR; INTRAVENOUS; SUBCUTANEOUS ONCE
Qty: 0 | Refills: 0 | Status: COMPLETED | OUTPATIENT
Start: 2019-04-04 | End: 2019-04-04

## 2019-04-04 RX ORDER — MONTELUKAST 4 MG/1
10 TABLET, CHEWABLE ORAL DAILY
Qty: 0 | Refills: 0 | Status: DISCONTINUED | OUTPATIENT
Start: 2019-04-04 | End: 2019-04-08

## 2019-04-04 RX ADMIN — ATORVASTATIN CALCIUM 80 MILLIGRAM(S): 80 TABLET, FILM COATED ORAL at 21:06

## 2019-04-04 RX ADMIN — SODIUM CHLORIDE 1000 MILLILITER(S): 9 INJECTION INTRAMUSCULAR; INTRAVENOUS; SUBCUTANEOUS at 16:29

## 2019-04-04 RX ADMIN — CYCLOBENZAPRINE HYDROCHLORIDE 10 MILLIGRAM(S): 10 TABLET, FILM COATED ORAL at 18:45

## 2019-04-04 RX ADMIN — DULOXETINE HYDROCHLORIDE 30 MILLIGRAM(S): 30 CAPSULE, DELAYED RELEASE ORAL at 21:06

## 2019-04-04 NOTE — ED CDU PROVIDER INITIAL DAY NOTE - PROGRESS NOTE DETAILS
This patient was signed out to me at 1900 hrs by day CDU SYED Iqbal and attending Dr. Ag; Cards/Uro recs appreciated; per sign-out discussion per Dr. Ag, advised to continue asa/plavix/lovenox until cardiac MRI rules out presence of LV thrombus.

## 2019-04-04 NOTE — ED ADULT NURSE REASSESSMENT NOTE - NS ED NURSE REASSESS COMMENT FT1
pt stable and comfortable denies cp sob fever chills, pt sent to cdu report given to CESIA Echeverria
Received report from CESIA Salazar. Pt at baseline mental status, AxOx4, sitting comfortably at bedside, does not appear to be in any acute distress at the moment. Pt denies CP, SOB, urinary symptoms, discomfort, dizziness, lightheadedness, or any other symptoms. Vitals noted and stable, MD at bedside, will continue to monitor.

## 2019-04-04 NOTE — CONSULT NOTE ADULT - SUBJECTIVE AND OBJECTIVE BOX
Patient seen and evaluated @   Chief Complaint:     HPI:    PMH:   Carpal Tunnel Syndrome  Asthma - never intubated  but hospitalized >5yrs ago    PSH:   H/O: hysterectomy  S/P Oophorectomy right 2006  History of Dilatation and Curettage  left Knee  trauma  h/o repair 1985  History of Nasal Septoplasty and sinus surgery 2003    Medications:     Allergies:  grass / pollen/ trees/ dust/ cats/ dogs------ sneezing/ wheezing/ itchy eyes/ itchy skin (Other)  No Known Drug Allergies    FAMILY HISTORY:  No pertinent family history in first degree relatives    Social History:  Smoking:  Alcohol:  Drugs:    Review of Systems:  REVIEW OF SYSTEMS:    CONSTITUTIONAL: No weakness, fevers or chills  EYES/ENT: No visual changes;  No dysphagia  NECK: No pain or stiffness  RESPIRATORY: No cough, wheezing, hemoptysis; No shortness of breath  CARDIOVASCULAR: No chest pain or palpitations; No lower extremity edema  GASTROINTESTINAL: No abdominal or epigastric pain. No nausea, vomiting, or hematemesis; No diarrhea or constipation. No melena or hematochezia.  BACK: No back pain  GENITOURINARY: No dysuria, frequency or hematuria  NEUROLOGICAL: No numbness or weakness  SKIN: No itching, burning, rashes, or lesions   All other review of systems is negative unless indicated above.  [ ] 10 point review of systems is otherwise negative except as mentioned above            [ ]Unable to obtain    Physical Exam:  T(C): 36.6 (04-04-19 @ 12:18), Max: 36.6 (04-04-19 @ 12:18)  HR: 94 (04-04-19 @ 13:31) (94 - 98)  BP: 124/88 (04-04-19 @ 13:31) (121/79 - 124/88)  RR: 16 (04-04-19 @ 13:31) (16 - 17)  SpO2: 98% (04-04-19 @ 13:31) (98% - 98%)  Wt(kg): --  GENERAL: No acute distress, well-developed  HEAD:  Atraumatic, Normocephalic  ENT: EOMI, PERRLA, conjunctiva and sclera clear, Neck supple, No JVD, moist mucosa  CHEST/LUNG: Clear to auscultation bilaterally; No wheeze, equal breath sounds bilaterally   BACK: No spinal tenderness  HEART: Regular rate and rhythm; No murmurs, rubs, or gallops  ABDOMEN: Soft, Nontender, Nondistended; Bowel sounds present  EXTREMITIES:  No clubbing, cyanosis, or edema  PSYCH: Nl behavior, nl affect  NEUROLOGY: AAOx3, non-focal, cranial nerves intact  SKIN: Normal color, No rashes or lesions      Daily     Daily     Cardiovascular Diagnostic Testing:  ECG: NSR    Echo:    Stress Testing:    Cath:     Interpretation of Telemetry:    Imaging:    Labs:                        13.8   5.05  )-----------( 206      ( 04 Apr 2019 13:36 )             45.2     04-04    139  |  102  |  14  ----------------------------<  93  4.2   |  24  |  0.94    Ca    9.0      04 Apr 2019 13:36    TPro  7.4  /  Alb  4.0  /  TBili  0.3  /  DBili  x   /  AST  50<H>  /  ALT  47<H>  /  AlkPhos  114  04-04    PT/INR - ( 04 Apr 2019 13:35 )   PT: 16.3 SEC;   INR: 1.41          PTT - ( 04 Apr 2019 13:35 )  PTT:42.5 SEC          Hemoglobin A1C, Whole Blood: 5.4 % (03-30 @ 05:00)    Thyroid Stimulating Hormone, Serum: 2.61 uIU/mL (03-29 @ 06:55) Patient seen and evaluated @   Chief Complaint:     HPI: 53F asthma, fibromyalgia w/ recent anterior wall STEMI s/p stent to 100% pLAD with swirling in LV- concern for LV thrombus- discharge on lovenox/coumadin bridge presenting with hematuria since last night.     Denies any syncope, chest pain, chest pressure, palpitations, dysuria.     PMH:   Carpal Tunnel Syndrome  Asthma - never intubated  but hospitalized >5yrs ago    PSH:   H/O: hysterectomy  S/P Oophorectomy right 2006  History of Dilatation and Curettage  left Knee  trauma  h/o repair 1985  History of Nasal Septoplasty and sinus surgery 2003    Medications:     Allergies:  grass / pollen/ trees/ dust/ cats/ dogs------ sneezing/ wheezing/ itchy eyes/ itchy skin (Other)  No Known Drug Allergies    FAMILY HISTORY:  No pertinent family history in first degree relatives    Social History:  Smoking: no  Alcohol: no  Drugs: no    Review of Systems:  REVIEW OF SYSTEMS:    CONSTITUTIONAL: No weakness, fevers or chills  EYES/ENT: No visual changes;  No dysphagia  NECK: No pain or stiffness  RESPIRATORY: No cough, wheezing, hemoptysis; No shortness of breath  CARDIOVASCULAR: No chest pain or palpitations; No lower extremity edema  GASTROINTESTINAL: No abdominal or epigastric pain. No nausea, vomiting, or hematemesis; No diarrhea or constipation. No melena or hematochezia.  BACK: No back pain  GENITOURINARY: No dysuria, frequency or hematuria  NEUROLOGICAL: No numbness or weakness  SKIN: No itching, burning, rashes, or lesions   All other review of systems is negative unless indicated above.  [x ] 10 point review of systems is otherwise negative except as mentioned above            [ ]Unable to obtain    Physical Exam:  T(C): 36.6 (04-04-19 @ 12:18), Max: 36.6 (04-04-19 @ 12:18)  HR: 94 (04-04-19 @ 13:31) (94 - 98)  BP: 124/88 (04-04-19 @ 13:31) (121/79 - 124/88)  RR: 16 (04-04-19 @ 13:31) (16 - 17)  SpO2: 98% (04-04-19 @ 13:31) (98% - 98%)  Wt(kg): --  GENERAL: No acute distress, well-developed  HEAD:  Atraumatic, Normocephalic  ENT: EOMI, PERRLA, conjunctiva and sclera clear, Neck supple, No JVD, moist mucosa  CHEST/LUNG: Clear to auscultation bilaterally; No wheeze, equal breath sounds bilaterally   BACK: No spinal tenderness  HEART: Regular rate and rhythm; No murmurs, rubs, or gallops  ABDOMEN: Soft, Nontender, Nondistended; Bowel sounds present  EXTREMITIES:  No clubbing, cyanosis, or edema  PSYCH: Nl behavior, nl affect  NEUROLOGY: AAOx3, non-focal, cranial nerves intact  SKIN: Normal color, No rashes or lesions      Daily     Daily     Cardiovascular Diagnostic Testing:  ECG: NSR    Echo:    Stress Testing:    Cath:     Interpretation of Telemetry:    Imaging:    Labs:                        13.8   5.05  )-----------( 206      ( 04 Apr 2019 13:36 )             45.2     04-04    139  |  102  |  14  ----------------------------<  93  4.2   |  24  |  0.94    Ca    9.0      04 Apr 2019 13:36    TPro  7.4  /  Alb  4.0  /  TBili  0.3  /  DBili  x   /  AST  50<H>  /  ALT  47<H>  /  AlkPhos  114  04-04    PT/INR - ( 04 Apr 2019 13:35 )   PT: 16.3 SEC;   INR: 1.41          PTT - ( 04 Apr 2019 13:35 )  PTT:42.5 SEC          Hemoglobin A1C, Whole Blood: 5.4 % (03-30 @ 05:00)    Thyroid Stimulating Hormone, Serum: 2.61 uIU/mL (03-29 @ 06:55)

## 2019-04-04 NOTE — CONSULT NOTE ADULT - SUBJECTIVE AND OBJECTIVE BOX
HPI    53 year old female with PMHx of Asthma, Fibromyalgia, STEMI on 3/29 s/p MINDI to LAD, LV thrombus on ASA/Plavix/TLovenox presents with 1d history of hematuria.  Woke up yesterday and had some pink tinged urine.  Denies burning with urination, incomplete emptying, hematuria in the past, exposure to toxic chemicals, kidney cancer, bladder cancer, nephrolithiasis.  Denies passing any clots in her urine.  Urine darkened this morning and she came in for evaluation.  No pain associated with urination.    PAST MEDICAL & SURGICAL HISTORY:  Carpal Tunnel Syndrome  Asthma - never intubated  but hospitalized >5yrs ago  H/O: hysterectomy  S/P Oophorectomy right 2006  History of Dilatation and Curettage  left Knee  trauma  h/o repair   History of Nasal Septoplasty and sinus surgery     MEDICATIONS  (STANDING):    MEDICATIONS  (PRN):    FAMILY HISTORY:  No pertinent family history in first degree relatives      Allergies    grass / pollen/ trees/ dust/ cats/ dogs------ sneezing/ wheezing/ itchy eyes/ itchy skin (Other)  No Known Drug Allergies    Intolerances    SOCIAL HISTORY: Works as teacher    REVIEW OF SYSTEMS: Otherwise negative as stated in HPI    Physical Exam  Vital signs  T(C): 36.1 (19 @ 17:16), Max: 36.6 (19 @ 12:18)  HR: 71 (19 @ 17:16)  BP: 124/76 (19 @ 17:16)  SpO2: 98% (19 @ 17:16)  Wt(kg): --    Output    Gen:  NAD    Pulm:  No respiratory distress  	  CV:  RRR    GI:  S/ND/NT    :  Urine tea colored with no clots  No suprapubic tenderness  No CVAT b/l    LABS:   @ 13:36  WBC 5.05  / Hct 45.2  / SCr 0.94         139  |  102  |  14  ----------------------------<  93  4.2   |  24  |  0.94    Ca    9.0      2019 13:36    TPro  7.4  /  Alb  4.0  /  TBili  0.3  /  DBili  x   /  AST  50<H>  /  ALT  47<H>  /  AlkPhos  114  04-04    PT/INR - ( 2019 13:35 )   PT: 16.3 SEC;   INR: 1.41          PTT - ( 2019 13:35 )  PTT:42.5 SEC  Urinalysis Basic - ( 2019 13:50 )    Color: BROWN / Appearance: TURBID / S.028 / pH: 6.0  Gluc: NEGATIVE / Ketone: NEGATIVE  / Bili: NEGATIVE / Urobili: NORMAL   Blood: LARGE / Protein: 100 / Nitrite: NEGATIVE   Leuk Esterase: TRACE / RBC: TNTC / WBC 2-5   Sq Epi: x / Non Sq Epi: x / Bacteria: x

## 2019-04-04 NOTE — ED PROVIDER NOTE - SKIN, MLM
Skin normal color for race, warm, dry and intact. + scattered ecchymosis at right anterior humerus, over abdomen above umbilicus measuring 6-7cm in diameter per pt from lovenox injections

## 2019-04-04 NOTE — ED CDU PROVIDER INITIAL DAY NOTE - OBJECTIVE STATEMENT
ED Provider Note: 53F hx of asthma, fibromyalgia w/ STEMI 3/29 s/p 1 MINDI to the LAD, LV thrombus on asa/plavix /lovenox, presenting w/ hematuria x 1 day as well as diarrhea x 3 days, nonbloody approx 5 BMs loose watery per day, with last BM with some slight blood tinged coloration on toilet paper. She is now concerned because she easily bruises and bleeds being on the new medication. No fevers/ chills. Notes some suprapubic abd pain. No nausea/ vomiting. No chest pain, sob, dizziness.  CDU Initial Day Note: SYED Iqbal, Agree w/ above, pt w/ hematuria, on asa/plavix/lovenox for presumed questionable LV thrombus based on echo which had suspicious findings (see echo report), p/w hematuria x 1 day. Pt noticed pink tinged urine now becoming increasingly darker and fabian. No other complaints at this time. Cardiology/Urology consulted, plan to obtain cardiac MRI to confirm or r/o LV thrombus and adjust AC accordingly.

## 2019-04-04 NOTE — CONSULT NOTE ADULT - ASSESSMENT
53 year old female with hx of STEMI in 3/29 w/ MINDI to LAD and LV thrombus currently on ASA/Plavix/TLovenox presents with 1d history of hematuria.  No risk factors for hematuria currently.  Discussed at length etiology of hematuria, and how anticoagulation can unmask an inciting factor.  Patient amenable to follow up as outpatient urology for CT Urogram and Cystoscopy.  Voiding with no issues, sample appears tea colored with no clots.    -- Patient to follow up at the [facility] [phone] for work up of gross hematuria.  Will likely need a repeat urine analysis, and possibly CT Urogram and office cystoscopy  -- May continue anticoagulation as normal
53F asthma, fibromyalgia w/ recent anterior wall STEMI s/p stent to 100% pLAD with swirling in LV- concern for LV thrombus- discharge on lovenox/coumadin bridge presenting with hematuria since last night.     #Hematuria - likely 2/2 to triple therapy - aspiring, plavix, lovenox/coumadin bridge.   - appreciate urology reccs  - can hold lovenox and coumadin for now  - continue aspiring / plavix  - Cardiac MRI to evaluate for LV thrombus. If not present, does not need AC.    #CAD s/p AWMI - stable  - cont DAPT, statin, beta blocker  - please start Losartan 25mg QD w/ AM meds    MARYSE Carney MD  Cardiology Fellow  [id number]

## 2019-04-04 NOTE — ED CDU PROVIDER INITIAL DAY NOTE - ATTENDING CONTRIBUTION TO CARE
53F h/o CAD s/p MINDI 1wk ago, asthma presents with hematuria. Admitted for STEMI last week, had stent placed. ECHO with LV dysfunction, question LV thrombus so was started on Lovenox in addition to asa/plavix. Last night started to note small blood in urine, continued today. No prior h/o hematuira, no dysuria or abd pain. No other bleeding. No CP/SOB. In ED seen by cardiology who recommends MRI to eval thrombus, because lovenox could be stopped if no thrombus. Seen by urology, no emergent intervention given no signs of clots or obstruction. On exam well appearing, nad, mmm, rrr, lungs clear, abd soft NT/ND, 2+ pulses, no edema, no rash, alert, speech clear. Plan for cardiac MRI, repeat labs.

## 2019-04-04 NOTE — ED ADULT NURSE NOTE - NSIMPLEMENTINTERV_GEN_ALL_ED
Implemented All Universal Safety Interventions:  Roby to call system. Call bell, personal items and telephone within reach. Instruct patient to call for assistance. Room bathroom lighting operational. Non-slip footwear when patient is off stretcher. Physically safe environment: no spills, clutter or unnecessary equipment. Stretcher in lowest position, wheels locked, appropriate side rails in place.

## 2019-04-04 NOTE — ED PROVIDER NOTE - PROGRESS NOTE DETAILS
Mode PGY2: being seen by cardiology Mode PGY2: cardiology suggesting cardiac MRI to rule out LV thrombus - that way patient can be taken off lovenox if it is not needed, pt seen by urology team, will place in cdu for MRI and to watch the CBC in 8 hours. CDU aware, will come eval pt Renae: Pt signed out to me by DR Reynoso pending cardiology evaluation and recommendations. Cardio recommending cardiac MRi to r/o LV thrombus. If negative will stop AC. Urology to follow pt in CDU as well. will trend H/H.

## 2019-04-04 NOTE — ED PROVIDER NOTE - CLINICAL SUMMARY MEDICAL DECISION MAKING FREE TEXT BOX
53F w/ recent STEMI and LV thrombus on new antiplatelets and anticoagulation, p/w hematuria in the setting of diarrhea, no cardiac complaints, will check H&H, and follow up with cardiology, if labs stable, will likely dc home

## 2019-04-04 NOTE — ED PROVIDER NOTE - ATTENDING CONTRIBUTION TO CARE
Patient is a 52 yo F with history of asthma, recent STEMI in March 29th 2019 s/p cath that showed 100% LAD blockage with subsequent stent placement, now on aspirin, plavix and lovenox injections here for complaint of hematuria x 1 day. Patient is complaining of abdominal cramping associated with nonbloody diarrhea, overall improving. She states she noticed some pink on toilet paper. Patient is c/o easy bruising of abdomen. Denies chest pain, shortness of breath, dizziness. Denies fevers. No nausea, vomiting. Denies dysuria    VS noted  Gen. no acute distress, Non toxic   HEENT: EOMI, mmm  Lungs: CTAB/L no C/ W /R   CVS: RRR   Abd;   Ext: no edema  Skin: no rash  Neuro AAOx3 non focal clear speech  a/p:   - [place] BARRY Patient is a 52 yo F with history of asthma, recent STEMI in March 29th 2019 s/p cath that showed 100% LAD blockage with subsequent stent placement, now on aspirin, plavix and lovenox injections here for complaint of hematuria x 1 day. Patient is complaining of abdominal cramping associated with nonbloody diarrhea, overall improving. She states she noticed some pink on toilet paper. Patient is c/o easy bruising of abdomen. Denies chest pain, shortness of breath, dizziness. Denies fevers. No nausea, vomiting. Denies dysuria    VS noted  Gen. no acute distress, Non toxic   HEENT: EOMI, mmm  Lungs: CTAB/L no C/ W /R   CVS: RRR   Abd; soft, hematoma is ttp, otherwise abd is non-tender  Ext: no edema  Skin: no rash  Neuro AAOx3 non focal clear speech  a/p: hematuria, on plavix/ asp/ lovenox - will check labs, discuss with cardiology  - Angeles BARRY

## 2019-04-04 NOTE — ED PROVIDER NOTE - OBJECTIVE STATEMENT
53F hx of asthma, fibromyalgia w/ STEMI 3/29 s/p 1 MINDI to the LAD, LV thrombus on asa/plavix /lovenox, presenting w/ hematuria x 1 day as well as diarrhea x 3 days, nonbloody approx 5 BMs loose watery per day, with last BM with some slight blood tinged coloration on toilet paper. She is now concerned because she easily bruises and bleeds being on the new medication. No fevers/ chills. Notes some suprapubic abd pain. No nausea/ vomiting. No chest pain, sob, dizziness.

## 2019-04-05 VITALS
OXYGEN SATURATION: 100 % | TEMPERATURE: 99 F | SYSTOLIC BLOOD PRESSURE: 122 MMHG | HEART RATE: 73 BPM | DIASTOLIC BLOOD PRESSURE: 87 MMHG | RESPIRATION RATE: 16 BRPM

## 2019-04-05 LAB
ALBUMIN SERPL ELPH-MCNC: 3.7 G/DL — SIGNIFICANT CHANGE UP (ref 3.3–5)
ALP SERPL-CCNC: 105 U/L — SIGNIFICANT CHANGE UP (ref 40–120)
ALT FLD-CCNC: 53 U/L — HIGH (ref 4–33)
ANION GAP SERPL CALC-SCNC: 13 MMO/L — SIGNIFICANT CHANGE UP (ref 7–14)
APTT BLD: 29.2 SEC — SIGNIFICANT CHANGE UP (ref 27.5–36.3)
AST SERPL-CCNC: 52 U/L — HIGH (ref 4–32)
BASOPHILS # BLD AUTO: 0.04 K/UL — SIGNIFICANT CHANGE UP (ref 0–0.2)
BASOPHILS NFR BLD AUTO: 0.8 % — SIGNIFICANT CHANGE UP (ref 0–2)
BILIRUB SERPL-MCNC: 0.3 MG/DL — SIGNIFICANT CHANGE UP (ref 0.2–1.2)
BUN SERPL-MCNC: 13 MG/DL — SIGNIFICANT CHANGE UP (ref 7–23)
CALCIUM SERPL-MCNC: 8.8 MG/DL — SIGNIFICANT CHANGE UP (ref 8.4–10.5)
CHLORIDE SERPL-SCNC: 103 MMOL/L — SIGNIFICANT CHANGE UP (ref 98–107)
CO2 SERPL-SCNC: 22 MMOL/L — SIGNIFICANT CHANGE UP (ref 22–31)
CREAT SERPL-MCNC: 0.8 MG/DL — SIGNIFICANT CHANGE UP (ref 0.5–1.3)
EOSINOPHIL # BLD AUTO: 0.19 K/UL — SIGNIFICANT CHANGE UP (ref 0–0.5)
EOSINOPHIL NFR BLD AUTO: 3.7 % — SIGNIFICANT CHANGE UP (ref 0–6)
GLUCOSE SERPL-MCNC: 94 MG/DL — SIGNIFICANT CHANGE UP (ref 70–99)
HBA1C BLD-MCNC: 5.4 % — SIGNIFICANT CHANGE UP (ref 4–5.6)
HCT VFR BLD CALC: 40.9 % — SIGNIFICANT CHANGE UP (ref 34.5–45)
HCT VFR BLD CALC: 41.8 % — SIGNIFICANT CHANGE UP (ref 34.5–45)
HGB BLD-MCNC: 12.9 G/DL — SIGNIFICANT CHANGE UP (ref 11.5–15.5)
HGB BLD-MCNC: 13 G/DL — SIGNIFICANT CHANGE UP (ref 11.5–15.5)
IMM GRANULOCYTES NFR BLD AUTO: 0.4 % — SIGNIFICANT CHANGE UP (ref 0–1.5)
INR BLD: 1.35 — HIGH (ref 0.88–1.17)
LYMPHOCYTES # BLD AUTO: 1.72 K/UL — SIGNIFICANT CHANGE UP (ref 1–3.3)
LYMPHOCYTES # BLD AUTO: 33.7 % — SIGNIFICANT CHANGE UP (ref 13–44)
MCHC RBC-ENTMCNC: 25.6 PG — LOW (ref 27–34)
MCHC RBC-ENTMCNC: 26.2 PG — LOW (ref 27–34)
MCHC RBC-ENTMCNC: 31.1 % — LOW (ref 32–36)
MCHC RBC-ENTMCNC: 31.5 % — LOW (ref 32–36)
MCV RBC AUTO: 82.4 FL — SIGNIFICANT CHANGE UP (ref 80–100)
MCV RBC AUTO: 83 FL — SIGNIFICANT CHANGE UP (ref 80–100)
MONOCYTES # BLD AUTO: 0.92 K/UL — HIGH (ref 0–0.9)
MONOCYTES NFR BLD AUTO: 18 % — HIGH (ref 2–14)
NEUTROPHILS # BLD AUTO: 2.21 K/UL — SIGNIFICANT CHANGE UP (ref 1.8–7.4)
NEUTROPHILS NFR BLD AUTO: 43.4 % — SIGNIFICANT CHANGE UP (ref 43–77)
NRBC # FLD: 0 K/UL — SIGNIFICANT CHANGE UP (ref 0–0)
NRBC # FLD: 0 K/UL — SIGNIFICANT CHANGE UP (ref 0–0)
PLATELET # BLD AUTO: 187 K/UL — SIGNIFICANT CHANGE UP (ref 150–400)
PLATELET # BLD AUTO: 194 K/UL — SIGNIFICANT CHANGE UP (ref 150–400)
PMV BLD: 11.9 FL — SIGNIFICANT CHANGE UP (ref 7–13)
PMV BLD: 12.1 FL — SIGNIFICANT CHANGE UP (ref 7–13)
POTASSIUM SERPL-MCNC: 3.8 MMOL/L — SIGNIFICANT CHANGE UP (ref 3.5–5.3)
POTASSIUM SERPL-SCNC: 3.8 MMOL/L — SIGNIFICANT CHANGE UP (ref 3.5–5.3)
PROT SERPL-MCNC: 6.9 G/DL — SIGNIFICANT CHANGE UP (ref 6–8.3)
PROTHROM AB SERPL-ACNC: 15.5 SEC — HIGH (ref 9.8–13.1)
RBC # BLD: 4.93 M/UL — SIGNIFICANT CHANGE UP (ref 3.8–5.2)
RBC # BLD: 5.07 M/UL — SIGNIFICANT CHANGE UP (ref 3.8–5.2)
RBC # FLD: 14.2 % — SIGNIFICANT CHANGE UP (ref 10.3–14.5)
RBC # FLD: 14.6 % — HIGH (ref 10.3–14.5)
SODIUM SERPL-SCNC: 138 MMOL/L — SIGNIFICANT CHANGE UP (ref 135–145)
SPECIMEN SOURCE: SIGNIFICANT CHANGE UP
WBC # BLD: 4.21 K/UL — SIGNIFICANT CHANGE UP (ref 3.8–10.5)
WBC # BLD: 5.1 K/UL — SIGNIFICANT CHANGE UP (ref 3.8–10.5)
WBC # FLD AUTO: 4.21 K/UL — SIGNIFICANT CHANGE UP (ref 3.8–10.5)
WBC # FLD AUTO: 5.1 K/UL — SIGNIFICANT CHANGE UP (ref 3.8–10.5)

## 2019-04-05 PROCEDURE — 75561 CARDIAC MRI FOR MORPH W/DYE: CPT | Mod: 26

## 2019-04-05 PROCEDURE — 99217: CPT

## 2019-04-05 RX ORDER — LOSARTAN POTASSIUM 100 MG/1
25 TABLET, FILM COATED ORAL DAILY
Qty: 0 | Refills: 0 | Status: DISCONTINUED | OUTPATIENT
Start: 2019-04-05 | End: 2019-04-08

## 2019-04-05 RX ORDER — CEPHALEXIN 500 MG
1 CAPSULE ORAL
Qty: 14 | Refills: 0
Start: 2019-04-05 | End: 2019-04-11

## 2019-04-05 RX ORDER — CEPHALEXIN 500 MG
500 CAPSULE ORAL ONCE
Qty: 0 | Refills: 0 | Status: COMPLETED | OUTPATIENT
Start: 2019-04-05 | End: 2019-04-05

## 2019-04-05 RX ADMIN — Medication 500 MILLIGRAM(S): at 17:00

## 2019-04-05 RX ADMIN — DULOXETINE HYDROCHLORIDE 30 MILLIGRAM(S): 30 CAPSULE, DELAYED RELEASE ORAL at 14:59

## 2019-04-05 RX ADMIN — BUDESONIDE AND FORMOTEROL FUMARATE DIHYDRATE 2 PUFF(S): 160; 4.5 AEROSOL RESPIRATORY (INHALATION) at 00:10

## 2019-04-05 RX ADMIN — Medication 81 MILLIGRAM(S): at 13:35

## 2019-04-05 RX ADMIN — CLOPIDOGREL BISULFATE 75 MILLIGRAM(S): 75 TABLET, FILM COATED ORAL at 13:35

## 2019-04-05 RX ADMIN — ENOXAPARIN SODIUM 105 MILLIGRAM(S): 100 INJECTION SUBCUTANEOUS at 05:31

## 2019-04-05 RX ADMIN — ALBUTEROL 2 PUFF(S): 90 AEROSOL, METERED ORAL at 00:20

## 2019-04-05 RX ADMIN — ZOLPIDEM TARTRATE 5 MILLIGRAM(S): 10 TABLET ORAL at 01:29

## 2019-04-05 RX ADMIN — MONTELUKAST 10 MILLIGRAM(S): 4 TABLET, CHEWABLE ORAL at 13:36

## 2019-04-05 RX ADMIN — BUDESONIDE AND FORMOTEROL FUMARATE DIHYDRATE 2 PUFF(S): 160; 4.5 AEROSOL RESPIRATORY (INHALATION) at 14:59

## 2019-04-05 NOTE — ED CDU PROVIDER SUBSEQUENT DAY NOTE - PMH
Asthma - never intubated  but hospitalized >5yrs ago    Carpal Tunnel Syndrome    Fibromyalgia    STEMI (ST elevation myocardial infarction)    Thrombus in heart chamber

## 2019-04-05 NOTE — PROGRESS NOTE ADULT - ASSESSMENT
53F asthma, fibromyalgia w/ recent anterior wall STEMI s/p stent to 100% pLAD with swirling in LV- concern for LV thrombus- discharge on lovenox/coumadin bridge presenting with hematuria since last night.     #Hematuria - likely 2/2 to triple therapy - aspiring, plavix, lovenox/coumadin bridge.   - appreciate urology reccs  - hold lovenox and coumadin for now  - continue aspiring / plavix  - Cardiac MRI to evaluate for LV thrombus. If not present, does not need AC.    #CAD s/p AWMI - stable  - cont DAPT, statin, beta blocker (can decrease hold parameters to systolic of 90)  - can hold Losartan 25mg for now    If note LV thrombus patient can be discharged w/ outpatient follow in the cardiology office with Dr. Roberta Cronin.    MARYSE Carney MD  Cardiology Fellow  [id number]

## 2019-04-05 NOTE — ED CDU PROVIDER SUBSEQUENT DAY NOTE - PROGRESS NOTE DETAILS
Pt objectively noted to be resting comfortably in the interim.  Pt. being signed out to the day CDU attending (Dr. Blue) and CDU day PA (Nancy) at 0700 hrs. Patient received at sign out this AM, resting comfortably, no complaints at this time. Pt admits to still having some pink tinged urine. Pt seen and evaluated but urology and Cardiology. Recommendation for Cardiac MRI this AM. Spoke with MRI who states test can be done later today. Pending am labs. Pt otherwise stable. Patient seen and evaluated by Cardiology this AM, asking patient to be dc off lovenox. Pt pending Cardiac MRI. Will continue to monitor closely. SYED Pina: spoke with cardiology regarding MRI - no thrombus +infarct. Recommending to d/c lovenox and coumadin and continue on plavix/asa and to f.u with tonya Pak out pt. Cleared from cardiology perspective/urology. pt ok for dc.

## 2019-04-05 NOTE — ED CDU PROVIDER SUBSEQUENT DAY NOTE - HISTORY
"ED Provider Note: 53F hx of asthma, fibromyalgia w/ STEMI 3/29 s/p 1 MINDI to the LAD, LV thrombus on asa/plavix /lovenox, presenting w/ hematuria x 1 day as well as diarrhea x 3 days, nonbloody approx 5 BMs loose watery per day, with last BM with some slight blood tinged coloration on toilet paper. She is now concerned because she easily bruises and bleeds being on the new medication. No fevers/ chills. Notes some suprapubic abd pain. No nausea/ vomiting. No chest pain, sob, dizziness.  CDU Initial Day Note: SYED Iqbal, Agree w/ above, pt w/ hematuria, on asa/plavix/lovenox for presumed questionable LV thrombus based on echo which had suspicious findings (see echo report), p/w hematuria x 1 day. Pt noticed pink tinged urine now becoming increasingly darker and fabian. No other complaints at this time. Cardiology/Urology consulted, plan to obtain cardiac MRI to confirm or r/o LV thrombus and adjust AC accordingly."  In the interim, pt objectively noted to be resting comfortably; no issues thus far.  Pt awaiting cardiac MRI; am labs currently testing.

## 2019-04-05 NOTE — ED CDU PROVIDER SUBSEQUENT DAY NOTE - ATTENDING CONTRIBUTION TO CARE
53F asthma, fibromyalgia, stemi Mar 29, ?LV thrombus, on ASA/plavix/loveonox (stent placed), came in with hematuria, also some loose stool with some blood.  Uro eval, trend H/H, outpt followup.  Cards saw pt, recc start losartan, hold lovenox/coumadin for now however will check MRI r/o LV thrombus and maintain lovenox until then, con't asa/plavix.  If needs admission Dr Palmer.  No k[place] as of yet. UPDATE - CMR found infarction (c/w hx and treated) but no LV thrombus, can d/c lovenox/coumadin.  Urine dark cloudy reddish, no clots.  Recheck hgb, f/u urology.  Rx abx for likely infection as trigger for hematuria.    VS:  unremarkable    GEN - NAD; well appearing; A+O x3   HEAD - NC/AT     ENT - PEERL, EOMI, mucous membranes dry, no discharge      NECK: Neck supple, non-tender without lymphadenopathy, no masses, no JVD  PULM - CTA b/l,  symmetric breath sounds  COR -  normal heart sounds    ABD - , ND, NT, soft,  BACK - no CVA tenderness, nontender spine     EXTREMS - no edema, no deformity, warm and well perfused    SKIN - no rash or bruising      NEUROLOGIC - alert, CN 2-12 intact, sensation nl, motor no focal deficit.

## 2019-04-05 NOTE — PROGRESS NOTE ADULT - SUBJECTIVE AND OBJECTIVE BOX
Interval History: Denies any chest pain, palpitations, dyspnea overnight.     Review Of Systems:  Constitutional: [ ] Fever [ ] Chills [ ] Fatigue [ ] Weight change   HEENT: [ ] Blurred vision [ ] Eye Pain [ ] Headache [ ] Runny nose [ ] Sore Throat   Respiratory: [ ] Cough [ ] Wheezing [ ] Shortness of breath  Cardiovascular: [ ] Chest Pain [ ] Palpitations [ ] KRISHNA [ ] PND [ ] Orthopnea  Gastrointestinal: [ ] Abdominal Pain [ ] Diarrhea [ ] Constipation [ ] Hemorrhoids [ ] Nausea [ ] Vomiting  Genitourinary: [ ] Nocturia [ ] Dysuria [ ] Incontinence  Extremities: [ ] Swelling [ ] Joint Pain  Neurologic: [ ] Focal deficit [ ] Paresthesias [ ] Syncope  Lymphatic: [ ] Swelling [ ] Lymphadenopathy   Skin: [ ] Rash [ ] Ecchymoses [ ] Wounds [ ] Lesions  Psychiatry: [ ] Depression [ ] Suicidal/Homicidal Ideation [ ] Anxiety [ ] Sleep Disturbances  [ ] 10 point review of systems is otherwise negative except as mentioned above            [ ]Unable to obtain    Medications:  ALBUTerol    90 MICROgram(s) HFA Inhaler 2 Puff(s) Inhalation every 6 hours PRN  aspirin  chewable 81 milliGRAM(s) Oral daily  atorvastatin 80 milliGRAM(s) Oral at bedtime  buDESOnide 160 MICROgram(s)/formoterol 4.5 MICROgram(s) Inhaler 2 Puff(s) Inhalation two times a day  clopidogrel Tablet 75 milliGRAM(s) Oral daily  DULoxetine 30 milliGRAM(s) Oral daily  enoxaparin Injectable 105 milliGRAM(s) SubCutaneous two times a day  losartan 25 milliGRAM(s) Oral daily  metoprolol succinate ER 25 milliGRAM(s) Oral daily  montelukast 10 milliGRAM(s) Oral daily  zolpidem 5 milliGRAM(s) Oral at bedtime PRN  zolpidem 5 milliGRAM(s) Oral at bedtime PRN      Vitals:  ICU Vital Signs Last 24 Hrs  T(C): 36.8 (05 Apr 2019 09:54), Max: 37.1 (05 Apr 2019 05:17)  T(F): 98.3 (05 Apr 2019 09:54), Max: 98.7 (05 Apr 2019 05:17)  HR: 66 (05 Apr 2019 09:54) (66 - 98)  BP: 102/70 (05 Apr 2019 09:54) (102/70 - 124/88)  BP(mean): --  ABP: --  ABP(mean): --  RR: 16 (05 Apr 2019 09:54) (16 - 18)  SpO2: 100% (05 Apr 2019 09:54) (97% - 100%)    Daily Height in cm: 172.72 (04 Apr 2019 18:44)    Daily   I&O's Summary      Physical Exam:  Appearance:  NAD [  ] Intubated [  ] Sedated  HENT: NC/AT  Cardiovascular: S1, S2, [  ] LE Edema Present, [  ] JVD Present  Respiratory: Clear to auscultation bilaterally,  [   ] Transmitted Breath Sounds From Vent/Trach  Gastrointestinal: Soft, Non-tender, Non-distended, BS+  Psychiatry: [  ] AAOx3  [   ] Follows Commands  [   ] Unable to assess  Skin: Intact,  [  ] Line Sites CDI, [  ] Wound sites CDI    Labs:                        13.0   5.10  )-----------( 187      ( 05 Apr 2019 05:30 )             41.8     04-05    138  |  103  |  13  ----------------------------<  94  3.8   |  22  |  0.80    Ca    8.8      05 Apr 2019 05:30    TPro  6.9  /  Alb  3.7  /  TBili  0.3  /  DBili  x   /  AST  52<H>  /  ALT  53<H>  /  AlkPhos  105  04-05    PT/INR - ( 05 Apr 2019 05:30 )   PT: 15.5 SEC;   INR: 1.35          PTT - ( 05 Apr 2019 05:30 )  PTT:29.2 SEC          Hemoglobin A1C, Whole Blood: 5.4 % (04-05 @ 05:30)        Culture - Urine (collected 04-04-19 @ 14:06)  Source: URINE MIDSTREAM  Preliminary Report (04-05-19 @ 09:13):    EC^Escherichia coli    COLONY COUNT: 10,000-49,000 CFU/mL

## 2019-04-05 NOTE — ED CDU PROVIDER DISPOSITION NOTE - CLINICAL COURSE
53F asthma, fibromyalgia, stemi Mar 29, ?LV thrombus, on ASA/plavix/loveonox (stent placed), came in with hematuria, also some loose stool with some blood.  Uro eval, trend H/H, outpt followup.  Cards saw pt, recc start losartan, hold lovenox/coumadin for now however will check MRI r/o LV thrombus and maintain lovenox until then, con't asa/plavix.  If needs admission Dr Palmer.  No k[place] as of yet. UPDATE - CMR found infarction (c/w hx and treated) but no LV thrombus, can d/c lovenox/coumadin.  Urine dark cloudy reddish, no clots.  Recheck hgb, f/u urology.  Rx abx for likely infection as trigger for hematuria.

## 2019-04-05 NOTE — ED CDU PROVIDER DISPOSITION NOTE - CARE PROVIDER_API CALL
Roberta Cronin)  Cardiovascular Disease  [facility], [address] [place] [address] [id number] [place] [id number]  Phone: [phone]  Fax: [phone]  Follow Up Time:

## 2019-04-05 NOTE — ED CDU PROVIDER DISPOSITION NOTE - NSFOLLOWUPINSTRUCTIONS_ED_ALL_ED_FT
STOP TAKING WARFARIN AND LOVENOX - CONTINUE TAKING CLOPIDOGREL AND ASPIRIN. Take Keflex 500mg twice daily for 7 days for urinary tract infection. See your regular doctor/cardiology/urology within 72 hours for follow-up care.  Follow up at the [facility] [phone]. Return to ER for new or worsening symptoms.

## 2019-04-06 LAB
-  AMIKACIN: SIGNIFICANT CHANGE UP
-  AMPICILLIN/SULBACTAM: SIGNIFICANT CHANGE UP
-  AMPICILLIN: SIGNIFICANT CHANGE UP
-  AZTREONAM: SIGNIFICANT CHANGE UP
-  CEFAZOLIN: SIGNIFICANT CHANGE UP
-  CEFEPIME: SIGNIFICANT CHANGE UP
-  CEFOXITIN: SIGNIFICANT CHANGE UP
-  CEFTAZIDIME: SIGNIFICANT CHANGE UP
-  CEFTRIAXONE: SIGNIFICANT CHANGE UP
-  CIPROFLOXACIN: SIGNIFICANT CHANGE UP
-  ERTAPENEM: SIGNIFICANT CHANGE UP
-  GENTAMICIN: SIGNIFICANT CHANGE UP
-  IMIPENEM: SIGNIFICANT CHANGE UP
-  LEVOFLOXACIN: SIGNIFICANT CHANGE UP
-  MEROPENEM: SIGNIFICANT CHANGE UP
-  NITROFURANTOIN: SIGNIFICANT CHANGE UP
-  PIPERACILLIN/TAZOBACTAM: SIGNIFICANT CHANGE UP
-  TIGECYCLINE: SIGNIFICANT CHANGE UP
-  TOBRAMYCIN: SIGNIFICANT CHANGE UP
-  TRIMETHOPRIM/SULFAMETHOXAZOLE: SIGNIFICANT CHANGE UP
BACTERIA UR CULT: SIGNIFICANT CHANGE UP
METHOD TYPE: SIGNIFICANT CHANGE UP
ORGANISM # SPEC MICROSCOPIC CNT: SIGNIFICANT CHANGE UP
ORGANISM # SPEC MICROSCOPIC CNT: SIGNIFICANT CHANGE UP

## 2019-04-09 PROBLEM — M79.7 FIBROMYALGIA: Chronic | Status: ACTIVE | Noted: 2019-04-05

## 2019-04-09 PROBLEM — I21.3 ST ELEVATION (STEMI) MYOCARDIAL INFARCTION OF UNSPECIFIED SITE: Chronic | Status: ACTIVE | Noted: 2019-04-05

## 2019-04-10 ENCOUNTER — APPOINTMENT (OUTPATIENT)
Dept: CARDIOLOGY | Facility: CLINIC | Age: 54
End: 2019-04-10
Payer: COMMERCIAL

## 2019-04-10 ENCOUNTER — NON-APPOINTMENT (OUTPATIENT)
Age: 54
End: 2019-04-10

## 2019-04-10 VITALS
WEIGHT: 132 LBS | SYSTOLIC BLOOD PRESSURE: 117 MMHG | HEART RATE: 83 BPM | OXYGEN SATURATION: 98 % | DIASTOLIC BLOOD PRESSURE: 79 MMHG | HEIGHT: 67 IN | RESPIRATION RATE: 16 BRPM | BODY MASS INDEX: 20.72 KG/M2

## 2019-04-10 DIAGNOSIS — J45.30 MILD PERSISTENT ASTHMA, UNCOMPLICATED: ICD-10-CM

## 2019-04-10 PROCEDURE — 93000 ELECTROCARDIOGRAM COMPLETE: CPT

## 2019-04-10 PROCEDURE — 99215 OFFICE O/P EST HI 40 MIN: CPT

## 2019-04-10 RX ORDER — DULOXETINE HYDROCHLORIDE 60 MG/1
60 CAPSULE, DELAYED RELEASE PELLETS ORAL
Qty: 30 | Refills: 0 | Status: ACTIVE | COMMUNITY
Start: 2019-03-07

## 2019-04-10 RX ORDER — CYCLOBENZAPRINE HYDROCHLORIDE 10 MG/1
10 TABLET, FILM COATED ORAL
Qty: 30 | Refills: 0 | Status: ACTIVE | COMMUNITY
Start: 2018-10-05

## 2019-04-10 NOTE — HISTORY OF PRESENT ILLNESS
[FreeTextEntry1] : 53 year old woman with no PMH admitted with STEMI- Also with possible LV thrombus on TTE prior to discharge. Initially sent home on Lovenox with Coumadin but developed hematuria- subsequent cardiac MRI showed no LV thrombus so AC stopped.\par 1. CAD- SP AWMI- PCI to pLAD as follows: CORONARY VESSELS: The coronary circulation is right dominant.\par LM: Normal.\par Distal LAD: There was a 100 % stenosis.\par Circumflex: Normal.\par RCA: Normal.\par EKG reviewed and without changes. On ASA and Plavix, condition is stable. Continue present meds\par 2. LV thrombus- Limited repeat study to evaluate for LV thrombus.  Endocardium not well visualized; grossly mild to moderate segmental left ventricular systolic dysfunction.  Hypokinesis of the apex, distal septum, and distal anterior wall.  Endocardial visualization enhanced with intravenous injection of echo contrast (Definity).  No obvious LV thrombus seen.\par Cardiac MRI: IMPRESSION: Myocardial delayed hyperenhancement involving the apex of the \par left ventricle as described above compatible with infarcted myocardium.\par No need for AC at this time\par 3. HTN- On Low dose metoprolol, continue present meds\par 4. HLD- On Statin therapy, condition is stable. Continue present meds\par 5. R/O SCAD- US carotids and AB(limited) but negative, will watch BP\par

## 2019-04-10 NOTE — DISCUSSION/SUMMARY
[FreeTextEntry1] : 53 year old woman with history of asthma, comes in after STEMI with PCI pLAD, apical infarct but preserved EF, no LV thrombus, HLD here for followup\par 1. CAD- PCI to LAD, on ASA and Plavix. Condition is stable. Continue current meds\par 2. HTN- On Toprol 25 mg daily, condition is stable.\par 3. HLD- Cont statin therapy, will decrease lipitor at next visit\par 4. FU in 6 weeks

## 2019-04-10 NOTE — PHYSICAL EXAM
[General Appearance - Well Developed] : well developed [Normal Appearance] : normal appearance [Well Groomed] : well groomed [General Appearance - Well Nourished] : well nourished [No Deformities] : no deformities [General Appearance - In No Acute Distress] : no acute distress [Normal Conjunctiva] : the conjunctiva exhibited no abnormalities [Eyelids - No Xanthelasma] : the eyelids demonstrated no xanthelasmas [Normal Oral Mucosa] : normal oral mucosa [No Oral Pallor] : no oral pallor [No Oral Cyanosis] : no oral cyanosis [Normal Jugular Venous A Waves Present] : normal jugular venous A waves present [Normal Jugular Venous V Waves Present] : normal jugular venous V waves present [No Jugular Venous Larsen A Waves] : no jugular venous larsen A waves [Heart Rate And Rhythm] : heart rate and rhythm were normal [Heart Sounds] : normal S1 and S2 [Murmurs] : no murmurs present [Exaggerated Use Of Accessory Muscles For Inspiration] : no accessory muscle use [Respiration, Rhythm And Depth] : normal respiratory rhythm and effort [Auscultation Breath Sounds / Voice Sounds] : lungs were clear to auscultation bilaterally [Abdomen Soft] : soft [Abdomen Tenderness] : non-tender [Abdomen Mass (___ Cm)] : no abdominal mass palpated [Abnormal Walk] : normal gait [Gait - Sufficient For Exercise Testing] : the gait was sufficient for exercise testing [Nail Clubbing] : no clubbing of the fingernails [Cyanosis, Localized] : no localized cyanosis [Petechial Hemorrhages (___cm)] : no petechial hemorrhages [Skin Color & Pigmentation] : normal skin color and pigmentation [] : no rash [No Venous Stasis] : no venous stasis [Skin Lesions] : no skin lesions [No Skin Ulcers] : no skin ulcer [No Xanthoma] : no  xanthoma was observed

## 2019-05-21 ENCOUNTER — APPOINTMENT (OUTPATIENT)
Dept: CARDIOLOGY | Facility: CLINIC | Age: 54
End: 2019-05-21
Payer: COMMERCIAL

## 2019-05-21 ENCOUNTER — NON-APPOINTMENT (OUTPATIENT)
Age: 54
End: 2019-05-21

## 2019-05-21 VITALS
HEART RATE: 71 BPM | BODY MASS INDEX: 35.79 KG/M2 | DIASTOLIC BLOOD PRESSURE: 77 MMHG | HEIGHT: 67 IN | WEIGHT: 228 LBS | SYSTOLIC BLOOD PRESSURE: 120 MMHG | OXYGEN SATURATION: 96 %

## 2019-05-21 PROCEDURE — 93000 ELECTROCARDIOGRAM COMPLETE: CPT

## 2019-05-21 PROCEDURE — 99214 OFFICE O/P EST MOD 30 MIN: CPT

## 2019-05-21 NOTE — HISTORY OF PRESENT ILLNESS
[FreeTextEntry1] : Here for followup. Doing well and back at work. No new complaints. Taking medications.\par 1. CAD- AWMI with PCI to dLAD. EKG reviewed and stable.\par On ASA and Plavix therapy\par Condition is stable. Continue present meds\par 2. HTN- On low dose metoprolol, continue present meds\par 3. HLD- On statin therapy, condition is stable. COntinue present meds

## 2019-05-21 NOTE — DISCUSSION/SUMMARY
[FreeTextEntry1] : 53 year old woman with history of STEMI HTN HLD here for followup\par \par 1. CAD- AWMI with PCI to dLAD. EKG reviewed and stable.\par On ASA and Plavix therapy\par Condition is stable. Continue present meds\par 2. HTN- On low dose metoprolol, continue present meds\par 3. HLD- On statin therapy, condition is stable. COntinue present meds\par 4. FU in 3 months

## 2019-05-21 NOTE — PHYSICAL EXAM
[General Appearance - Well Developed] : well developed [Normal Appearance] : normal appearance [Well Groomed] : well groomed [General Appearance - Well Nourished] : well nourished [No Deformities] : no deformities [General Appearance - In No Acute Distress] : no acute distress [Normal Conjunctiva] : the conjunctiva exhibited no abnormalities [Eyelids - No Xanthelasma] : the eyelids demonstrated no xanthelasmas [Normal Oral Mucosa] : normal oral mucosa [No Oral Pallor] : no oral pallor [No Oral Cyanosis] : no oral cyanosis [Normal Jugular Venous A Waves Present] : normal jugular venous A waves present [Normal Jugular Venous V Waves Present] : normal jugular venous V waves present [No Jugular Venous Larsen A Waves] : no jugular venous larsen A waves [Respiration, Rhythm And Depth] : normal respiratory rhythm and effort [Exaggerated Use Of Accessory Muscles For Inspiration] : no accessory muscle use [Auscultation Breath Sounds / Voice Sounds] : lungs were clear to auscultation bilaterally [Heart Rate And Rhythm] : heart rate and rhythm were normal [Heart Sounds] : normal S1 and S2 [Murmurs] : no murmurs present [Abdomen Soft] : soft [Abdomen Tenderness] : non-tender [Abdomen Mass (___ Cm)] : no abdominal mass palpated [Abnormal Walk] : normal gait [Gait - Sufficient For Exercise Testing] : the gait was sufficient for exercise testing [Nail Clubbing] : no clubbing of the fingernails [Cyanosis, Localized] : no localized cyanosis [Petechial Hemorrhages (___cm)] : no petechial hemorrhages [Skin Color & Pigmentation] : normal skin color and pigmentation [] : no rash [No Venous Stasis] : no venous stasis [Skin Lesions] : no skin lesions [No Skin Ulcers] : no skin ulcer [No Xanthoma] : no  xanthoma was observed

## 2019-08-20 ENCOUNTER — NON-APPOINTMENT (OUTPATIENT)
Age: 54
End: 2019-08-20

## 2019-08-20 ENCOUNTER — APPOINTMENT (OUTPATIENT)
Dept: CARDIOLOGY | Facility: CLINIC | Age: 54
End: 2019-08-20
Payer: COMMERCIAL

## 2019-08-20 VITALS
BODY MASS INDEX: 35.79 KG/M2 | OXYGEN SATURATION: 96 % | HEART RATE: 71 BPM | WEIGHT: 228 LBS | HEIGHT: 67 IN | RESPIRATION RATE: 16 BRPM | DIASTOLIC BLOOD PRESSURE: 87 MMHG | SYSTOLIC BLOOD PRESSURE: 134 MMHG

## 2019-08-20 PROCEDURE — 93000 ELECTROCARDIOGRAM COMPLETE: CPT

## 2019-08-20 PROCEDURE — 99215 OFFICE O/P EST HI 40 MIN: CPT

## 2019-08-20 NOTE — PHYSICAL EXAM
[General Appearance - Well Developed] : well developed [Normal Appearance] : normal appearance [Well Groomed] : well groomed [General Appearance - Well Nourished] : well nourished [No Deformities] : no deformities [General Appearance - In No Acute Distress] : no acute distress [Normal Conjunctiva] : the conjunctiva exhibited no abnormalities [Eyelids - No Xanthelasma] : the eyelids demonstrated no xanthelasmas [Normal Oral Mucosa] : normal oral mucosa [No Oral Pallor] : no oral pallor [No Oral Cyanosis] : no oral cyanosis [Normal Jugular Venous A Waves Present] : normal jugular venous A waves present [Normal Jugular Venous V Waves Present] : normal jugular venous V waves present [No Jugular Venous Larsen A Waves] : no jugular venous larsen A waves [Respiration, Rhythm And Depth] : normal respiratory rhythm and effort [Exaggerated Use Of Accessory Muscles For Inspiration] : no accessory muscle use [Auscultation Breath Sounds / Voice Sounds] : lungs were clear to auscultation bilaterally [Heart Rate And Rhythm] : heart rate and rhythm were normal [Murmurs] : no murmurs present [Heart Sounds] : normal S1 and S2 [Abdomen Tenderness] : non-tender [Abdomen Soft] : soft [Abdomen Mass (___ Cm)] : no abdominal mass palpated [Gait - Sufficient For Exercise Testing] : the gait was sufficient for exercise testing [Abnormal Walk] : normal gait [Nail Clubbing] : no clubbing of the fingernails [Cyanosis, Localized] : no localized cyanosis [Petechial Hemorrhages (___cm)] : no petechial hemorrhages [Skin Color & Pigmentation] : normal skin color and pigmentation [] : no rash [No Venous Stasis] : no venous stasis [Skin Lesions] : no skin lesions [No Xanthoma] : no  xanthoma was observed [No Skin Ulcers] : no skin ulcer

## 2019-08-20 NOTE — DISCUSSION/SUMMARY
[FreeTextEntry1] : 53 year old woman with history of CAD, sp MI, PCI to dLAD, HTN HLD here for followup\par \par 1. CAD- AWMI with PCI to dLAD, EKG reviewed and unchanged. On ASA and Plavix. Condition is stable. Continue present meds\par Admits to substernal chest pain that radiates to the neck\par Will check nuclear stress test\par 2. HTN- On Low dose metoprolol and tolerating, continue present meds\par 3. HLD- On statin therapy, condition is stable. Continue present meds\par 4. FU in 4 months

## 2019-08-20 NOTE — HISTORY OF PRESENT ILLNESS
[FreeTextEntry1] : Here for followup. \par Takes meds.\par 1. CAD- AWMI with PCI to dLAD, EKG reviewed and unchanged. On ASA and Plavix. Condition is stable. Continue present meds\par Admits to substernal chest pain that radiates to the neck\par Will check nuclear stress test\par 2. HTN- On Low dose metoprolol and tolerating, continue present meds\par 3. HLD- On statin therapy, condition is stable. Continue present meds\par

## 2019-10-15 ENCOUNTER — INPATIENT (INPATIENT)
Facility: HOSPITAL | Age: 54
LOS: 1 days | Discharge: HOME | End: 2019-10-17
Attending: INTERNAL MEDICINE | Admitting: INTERNAL MEDICINE
Payer: COMMERCIAL

## 2019-10-15 VITALS
OXYGEN SATURATION: 95 % | RESPIRATION RATE: 18 BRPM | SYSTOLIC BLOOD PRESSURE: 114 MMHG | HEART RATE: 85 BPM | DIASTOLIC BLOOD PRESSURE: 73 MMHG | TEMPERATURE: 98 F

## 2019-10-15 DIAGNOSIS — Z95.5 PRESENCE OF CORONARY ANGIOPLASTY IMPLANT AND GRAFT: Chronic | ICD-10-CM

## 2019-10-15 DIAGNOSIS — Z29.9 ENCOUNTER FOR PROPHYLACTIC MEASURES, UNSPECIFIED: ICD-10-CM

## 2019-10-15 DIAGNOSIS — E78.5 HYPERLIPIDEMIA, UNSPECIFIED: ICD-10-CM

## 2019-10-15 DIAGNOSIS — I25.10 ATHEROSCLEROTIC HEART DISEASE OF NATIVE CORONARY ARTERY WITHOUT ANGINA PECTORIS: ICD-10-CM

## 2019-10-15 DIAGNOSIS — Z98.890 OTHER SPECIFIED POSTPROCEDURAL STATES: Chronic | ICD-10-CM

## 2019-10-15 DIAGNOSIS — Z90.710 ACQUIRED ABSENCE OF BOTH CERVIX AND UTERUS: Chronic | ICD-10-CM

## 2019-10-15 DIAGNOSIS — I50.9 HEART FAILURE, UNSPECIFIED: ICD-10-CM

## 2019-10-15 DIAGNOSIS — Z90.721 ACQUIRED ABSENCE OF OVARIES, UNILATERAL: Chronic | ICD-10-CM

## 2019-10-15 DIAGNOSIS — J45.909 UNSPECIFIED ASTHMA, UNCOMPLICATED: ICD-10-CM

## 2019-10-15 DIAGNOSIS — R07.9 CHEST PAIN, UNSPECIFIED: ICD-10-CM

## 2019-10-15 DIAGNOSIS — R07.89 OTHER CHEST PAIN: ICD-10-CM

## 2019-10-15 LAB
ALBUMIN SERPL ELPH-MCNC: 3.9 G/DL — SIGNIFICANT CHANGE UP (ref 3.3–5)
ALP SERPL-CCNC: 120 U/L — SIGNIFICANT CHANGE UP (ref 40–120)
ALT FLD-CCNC: 22 U/L — SIGNIFICANT CHANGE UP (ref 4–33)
ANION GAP SERPL CALC-SCNC: 13 MMO/L — SIGNIFICANT CHANGE UP (ref 7–14)
APPEARANCE UR: CLEAR — SIGNIFICANT CHANGE UP
AST SERPL-CCNC: 26 U/L — SIGNIFICANT CHANGE UP (ref 4–32)
BACTERIA # UR AUTO: NEGATIVE — SIGNIFICANT CHANGE UP
BASOPHILS # BLD AUTO: 0.03 K/UL — SIGNIFICANT CHANGE UP (ref 0–0.2)
BASOPHILS NFR BLD AUTO: 0.3 % — SIGNIFICANT CHANGE UP (ref 0–2)
BILIRUB SERPL-MCNC: 0.2 MG/DL — SIGNIFICANT CHANGE UP (ref 0.2–1.2)
BILIRUB UR-MCNC: NEGATIVE — SIGNIFICANT CHANGE UP
BLOOD UR QL VISUAL: SIGNIFICANT CHANGE UP
BUN SERPL-MCNC: 15 MG/DL — SIGNIFICANT CHANGE UP (ref 7–23)
CALCIUM SERPL-MCNC: 9.3 MG/DL — SIGNIFICANT CHANGE UP (ref 8.4–10.5)
CHLORIDE SERPL-SCNC: 103 MMOL/L — SIGNIFICANT CHANGE UP (ref 98–107)
CO2 SERPL-SCNC: 26 MMOL/L — SIGNIFICANT CHANGE UP (ref 22–31)
COLOR SPEC: YELLOW — SIGNIFICANT CHANGE UP
CREAT SERPL-MCNC: 0.72 MG/DL — SIGNIFICANT CHANGE UP (ref 0.5–1.3)
EOSINOPHIL # BLD AUTO: 0.15 K/UL — SIGNIFICANT CHANGE UP (ref 0–0.5)
EOSINOPHIL NFR BLD AUTO: 1.7 % — SIGNIFICANT CHANGE UP (ref 0–6)
GLUCOSE SERPL-MCNC: 75 MG/DL — SIGNIFICANT CHANGE UP (ref 70–99)
GLUCOSE UR-MCNC: NEGATIVE — SIGNIFICANT CHANGE UP
HCT VFR BLD CALC: 45.6 % — HIGH (ref 34.5–45)
HGB BLD-MCNC: 13.3 G/DL — SIGNIFICANT CHANGE UP (ref 11.5–15.5)
HYALINE CASTS # UR AUTO: SIGNIFICANT CHANGE UP
IMM GRANULOCYTES NFR BLD AUTO: 0.5 % — SIGNIFICANT CHANGE UP (ref 0–1.5)
KETONES UR-MCNC: NEGATIVE — SIGNIFICANT CHANGE UP
LEUKOCYTE ESTERASE UR-ACNC: NEGATIVE — SIGNIFICANT CHANGE UP
LYMPHOCYTES # BLD AUTO: 2.9 K/UL — SIGNIFICANT CHANGE UP (ref 1–3.3)
LYMPHOCYTES # BLD AUTO: 33.4 % — SIGNIFICANT CHANGE UP (ref 13–44)
MCHC RBC-ENTMCNC: 25.3 PG — LOW (ref 27–34)
MCHC RBC-ENTMCNC: 29.2 % — LOW (ref 32–36)
MCV RBC AUTO: 86.7 FL — SIGNIFICANT CHANGE UP (ref 80–100)
MONOCYTES # BLD AUTO: 0.78 K/UL — SIGNIFICANT CHANGE UP (ref 0–0.9)
MONOCYTES NFR BLD AUTO: 9 % — SIGNIFICANT CHANGE UP (ref 2–14)
NEUTROPHILS # BLD AUTO: 4.79 K/UL — SIGNIFICANT CHANGE UP (ref 1.8–7.4)
NEUTROPHILS NFR BLD AUTO: 55.1 % — SIGNIFICANT CHANGE UP (ref 43–77)
NITRITE UR-MCNC: NEGATIVE — SIGNIFICANT CHANGE UP
NRBC # FLD: 0 K/UL — SIGNIFICANT CHANGE UP (ref 0–0)
PH UR: 6 — SIGNIFICANT CHANGE UP (ref 5–8)
PLATELET # BLD AUTO: 220 K/UL — SIGNIFICANT CHANGE UP (ref 150–400)
PMV BLD: 11.8 FL — SIGNIFICANT CHANGE UP (ref 7–13)
POTASSIUM SERPL-MCNC: 4 MMOL/L — SIGNIFICANT CHANGE UP (ref 3.5–5.3)
POTASSIUM SERPL-SCNC: 4 MMOL/L — SIGNIFICANT CHANGE UP (ref 3.5–5.3)
PROT SERPL-MCNC: 7.4 G/DL — SIGNIFICANT CHANGE UP (ref 6–8.3)
PROT UR-MCNC: 20 — SIGNIFICANT CHANGE UP
RBC # BLD: 5.26 M/UL — HIGH (ref 3.8–5.2)
RBC # FLD: 14.9 % — HIGH (ref 10.3–14.5)
RBC CASTS # UR COMP ASSIST: HIGH (ref 0–?)
SODIUM SERPL-SCNC: 142 MMOL/L — SIGNIFICANT CHANGE UP (ref 135–145)
SP GR SPEC: 1.03 — SIGNIFICANT CHANGE UP (ref 1–1.04)
SQUAMOUS # UR AUTO: SIGNIFICANT CHANGE UP
TROPONIN T, HIGH SENSITIVITY: 8 NG/L — SIGNIFICANT CHANGE UP (ref ?–14)
TROPONIN T, HIGH SENSITIVITY: 8 NG/L — SIGNIFICANT CHANGE UP (ref ?–14)
UROBILINOGEN FLD QL: NORMAL — SIGNIFICANT CHANGE UP
WBC # BLD: 8.69 K/UL — SIGNIFICANT CHANGE UP (ref 3.8–10.5)
WBC # FLD AUTO: 8.69 K/UL — SIGNIFICANT CHANGE UP (ref 3.8–10.5)
WBC UR QL: SIGNIFICANT CHANGE UP (ref 0–?)

## 2019-10-15 PROCEDURE — 99223 1ST HOSP IP/OBS HIGH 75: CPT

## 2019-10-15 PROCEDURE — 71046 X-RAY EXAM CHEST 2 VIEWS: CPT | Mod: 26

## 2019-10-15 RX ORDER — CYCLOBENZAPRINE HYDROCHLORIDE 10 MG/1
10 TABLET, FILM COATED ORAL
Qty: 0 | Refills: 0 | DISCHARGE

## 2019-10-15 RX ORDER — CLOPIDOGREL BISULFATE 75 MG/1
75 TABLET, FILM COATED ORAL DAILY
Refills: 0 | Status: DISCONTINUED | OUTPATIENT
Start: 2019-10-15 | End: 2019-10-17

## 2019-10-15 RX ORDER — BUDESONIDE AND FORMOTEROL FUMARATE DIHYDRATE 160; 4.5 UG/1; UG/1
2 AEROSOL RESPIRATORY (INHALATION)
Refills: 0 | Status: DISCONTINUED | OUTPATIENT
Start: 2019-10-15 | End: 2019-10-17

## 2019-10-15 RX ORDER — ALBUTEROL 90 UG/1
2 AEROSOL, METERED ORAL EVERY 6 HOURS
Refills: 0 | Status: DISCONTINUED | OUTPATIENT
Start: 2019-10-15 | End: 2019-10-17

## 2019-10-15 RX ORDER — ZOLPIDEM TARTRATE 10 MG/1
5 TABLET ORAL AT BEDTIME
Refills: 0 | Status: DISCONTINUED | OUTPATIENT
Start: 2019-10-15 | End: 2019-10-17

## 2019-10-15 RX ORDER — MONTELUKAST 4 MG/1
10 TABLET, CHEWABLE ORAL AT BEDTIME
Refills: 0 | Status: DISCONTINUED | OUTPATIENT
Start: 2019-10-15 | End: 2019-10-17

## 2019-10-15 RX ORDER — ENOXAPARIN SODIUM 100 MG/ML
40 INJECTION SUBCUTANEOUS DAILY
Refills: 0 | Status: DISCONTINUED | OUTPATIENT
Start: 2019-10-15 | End: 2019-10-17

## 2019-10-15 RX ORDER — DULOXETINE HYDROCHLORIDE 30 MG/1
1 CAPSULE, DELAYED RELEASE ORAL
Qty: 0 | Refills: 0 | DISCHARGE

## 2019-10-15 RX ORDER — DULOXETINE HYDROCHLORIDE 30 MG/1
60 CAPSULE, DELAYED RELEASE ORAL DAILY
Refills: 0 | Status: DISCONTINUED | OUTPATIENT
Start: 2019-10-15 | End: 2019-10-17

## 2019-10-15 RX ORDER — ASPIRIN/CALCIUM CARB/MAGNESIUM 324 MG
81 TABLET ORAL DAILY
Refills: 0 | Status: DISCONTINUED | OUTPATIENT
Start: 2019-10-15 | End: 2019-10-17

## 2019-10-15 RX ORDER — ATORVASTATIN CALCIUM 80 MG/1
40 TABLET, FILM COATED ORAL AT BEDTIME
Refills: 0 | Status: DISCONTINUED | OUTPATIENT
Start: 2019-10-15 | End: 2019-10-17

## 2019-10-15 RX ORDER — ACETAMINOPHEN 500 MG
650 TABLET ORAL ONCE
Refills: 0 | Status: COMPLETED | OUTPATIENT
Start: 2019-10-15 | End: 2019-10-15

## 2019-10-15 RX ORDER — METOPROLOL TARTRATE 50 MG
25 TABLET ORAL DAILY
Refills: 0 | Status: DISCONTINUED | OUTPATIENT
Start: 2019-10-15 | End: 2019-10-17

## 2019-10-15 RX ORDER — CYCLOBENZAPRINE HYDROCHLORIDE 10 MG/1
10 TABLET, FILM COATED ORAL THREE TIMES A DAY
Refills: 0 | Status: DISCONTINUED | OUTPATIENT
Start: 2019-10-15 | End: 2019-10-17

## 2019-10-15 RX ADMIN — BUDESONIDE AND FORMOTEROL FUMARATE DIHYDRATE 2 PUFF(S): 160; 4.5 AEROSOL RESPIRATORY (INHALATION) at 22:11

## 2019-10-15 RX ADMIN — ATORVASTATIN CALCIUM 40 MILLIGRAM(S): 80 TABLET, FILM COATED ORAL at 22:09

## 2019-10-15 NOTE — H&P ADULT - NEGATIVE OPHTHALMOLOGIC SYMPTOMS
no pain R/no loss of vision R/no loss of vision L/no diplopia/no photophobia/no blurred vision R/no pain L/no blurred vision L

## 2019-10-15 NOTE — H&P ADULT - HISTORY OF PRESENT ILLNESS
55 y/o female with a PMHx of AWSTEMI with CAD S/P MINDI to % in March 2019, ischemic cardiomyopathy with improved LV function, HLD, asthma and fibromyalgia presents to ED with chest pain and shortness of breath. Pt has been experiencing constant, non-pleuritic, non-exertional, substernal chest pain radiating to the neck and left shoulder, associated with tingling and "fatigue" of the left shoulder. Pt states that the symptoms have been going on for 4 days with no provoking factors, relatively constant but fluctuating in intensity. Pt describes the chest pain as a "heaviness" and denies any exertional or positional component. Pt does admit that her chest pain is made worse when she lays down on her left side. Pt also elicits nausea and shortness of breath and dyspnea on exertion over the past few days, for which she has been using her inhalers more than usual. Pt states that her pain is similar to the pain she experienced when the stent was placed but she did not have neck or shoulder pain at that time, like she does now. Pt does not report any injury to the chest wall or any recent illness. Pt was supposed to have a nuclear stress test in August 2019, but never did it. Pt denies fever, chills, recent travel, headache, dizziness, visual deficits, orthopnea, palpitations, abdominal pain, V/D/C, hematochezia, melena, dysuria, hematuria, LOC, syncope, peripheral edema. Upon arrival to ED, EKG: NSR at 63 bpm with TWI III, AVF, V1-V6. CE x2: Trop 8-->8. Pt is admitted to telemetry. 53 y/o female with a PMHx of AWSTEMI with CAD S/P MINDI to % in March 2019, ischemic cardiomyopathy with improved LV function, HLD, asthma and fibromyalgia presents to ED with chest pain and shortness of breath. Pt has been experiencing constant, non-pleuritic, non-exertional, substernal chest pain radiating to the neck and left shoulder, associated with tingling and "fatigue" of the left shoulder. Pt states that the symptoms have been going on for 4 days with no provoking factors, relatively constant but fluctuating in intensity. Pt describes the chest pain as a "heaviness" and denies any exertional or positional component. Pt does admit that her chest pain is made worse when she lays down on her left side. Pt also reports nausea and shortness of breath and dyspnea on exertion over the past few days, for which she has been using her inhalers more than usual. Pt states that her pain is similar to the pain she experienced when the stent was placed but she did not have neck or shoulder pain at that time, like she does now. Pt does not report any injury to the chest wall or any recent illness. Pt was supposed to have a nuclear stress test in August 2019, but never did it. Pt denies fever, chills, recent travel, headache, dizziness, visual deficits, orthopnea, palpitations, abdominal pain, V/D/C, hematochezia, melena, dysuria, hematuria, LOC, syncope, peripheral edema. Upon arrival to ED, EKG: NSR at 63 bpm with TWI III, AVF, V1-V6. CE x2: Trop 8-->8. Pt is admitted to telemetry.

## 2019-10-15 NOTE — H&P ADULT - PROBLEM SELECTOR PLAN 3
Pt has a history of ischemic cardiomyopathy however recent cardiac MRI shows improvement in LV function with EF 62%  Pt without evidence of fluid overload at this time  Continue Metoprolol

## 2019-10-15 NOTE — H&P ADULT - PROBLEM SELECTOR PLAN 1
Pt with atypical chest pain based on clinical story  EKG shows NSR at 63 bpm with TWI III, AVF, V1-V6  Delta troponin negative 8-->8  Cards consult appreciated (house)  Given history, will obtain pharm NST  Discussed with hospitalist

## 2019-10-15 NOTE — H&P ADULT - ATTENDING COMMENTS
Pt seen and examined on 10/15.  Pt with pmh significant for CAD with stemi s/p stent presenting with chest pain and dyspnea.  Presently reports pain is improved.  No troponin elevation.  Cardiology consult reviewed  Plan is for pharm-nuclear stress test tomorrow. Will continue to monitor on tele.

## 2019-10-15 NOTE — H&P ADULT - NEUROLOGICAL DETAILS
sensation intact/alert and oriented x 3/cranial nerves intact/normal strength/responds to pain/responds to verbal commands

## 2019-10-15 NOTE — H&P ADULT - NEGATIVE GASTROINTESTINAL SYMPTOMS
no flatulence/no change in bowel habits/no hematochezia/no abdominal pain/no diarrhea/no vomiting/no melena/no constipation

## 2019-10-15 NOTE — ED ADULT NURSE NOTE - OBJECTIVE STATEMENT
Pt A+OX3 c/o constant CP all weekend that was relieved after receiving NTG SL enroute by EMS that's returned.  No associated SOB.  PMH cardiac stents in [facility] 2019.  On aspirin and plavix.  Arrives with O2 2L NC in use and #22g SL L wrist by EMS.  Labs obtained and sent as ordered.  #20g SL R arm placed.  EKG done.  CM placed

## 2019-10-15 NOTE — CONSULT NOTE ADULT - SUBJECTIVE AND OBJECTIVE BOX
Patient seen and evaluated at bedside    Chief Complaint: Chest pain    HPI:  Ms Dya is a 54 yo F with a PMH significant for CAD s/p AWSTEMI March 2019 s/p MINDI to pLAD, fibromyalgia and asthma who presents with chest pain.     In the ED, /73, P 85, RR 18, O2 sat 95% on 3L nasal cannula. HsTrop 8 x 2.     PMHx:   STEMI (ST elevation myocardial infarction)  Fibromyalgia  Carpal Tunnel Syndrome  Asthma - never intubated  but hospitalized >5yrs ago      PSHx:   H/O: hysterectomy  S/P Oophorectomy right 2006  History of Dilatation and Curettage  left Knee  trauma  h/o repair 1985  History of Nasal Septoplasty and sinus surgery 2003      Allergies:  grass / pollen/ trees/ dust/ cats/ dogs------ sneezing/ wheezing/ itchy eyes/ itchy skin (Other)  NKDA      Home Meds:  ASA 81mg daily  clopidogrel 75mg daily  atorvastatin 40mg qhs  metoprolol succinate 25mg daily  cyclobenzaprine  duloxetine  montelukast  symbicort  ventolin  zolpidem    FAMILY HISTORY:  Noncontributory    Social History:  Smoking History: denies  Alcohol Use: denies  Drug Use: denies    REVIEW OF SYSTEMS:  CONSTITUTIONAL: No weakness, fevers or chills  EYES/ENT: No visual changes;  No dysphagia  NECK: No pain or stiffness  RESPIRATORY: No cough, wheezing, hemoptysis; No shortness of breath  CARDIOVASCULAR: No chest pain or palpitations; No lower extremity edema  GASTROINTESTINAL: No abdominal or epigastric pain. No nausea, vomiting, or hematemesis; No diarrhea or constipation. No melena or hematochezia.  GENITOURINARY: No dysuria, frequency or hematuria  NEUROLOGICAL: No numbness or weakness  SKIN: No itching, burning, rashes, or lesions   All other review of systems is negative unless indicated above.    Physical Exam:  T(F): 98.2 (10-15), Max: 98.2 (10-15)  HR: 60 (10-15) (60 - 85)  BP: 151/88 (10-15) (114/73 - 151/88)  RR: 16 (10-15)  SpO2: 99% (10-15)  GENERAL: No acute distress, well-developed  HEAD:  Atraumatic, Normocephalic  ENT: EOMI, PERRLA, conjunctiva and sclera clear, Neck supple, No JVD, moist mucosa  CHEST/LUNG: Clear to auscultation bilaterally; No wheeze, equal breath sounds bilaterally   BACK: No spinal tenderness  HEART: Regular rate and rhythm; No murmurs, rubs, or gallops  ABDOMEN: Soft, Nontender, Nondistended; Bowel sounds present  EXTREMITIES:  No clubbing, cyanosis, or edema  PSYCH: Nl behavior, nl affect  NEUROLOGY: AAOx3, non-focal, cranial nerves intact  SKIN: Normal color, No rashes or lesions      Cardiovascular Diagnostic Testing:    ECG: Personally reviewed:    Echo: Personally reviewed:  4/1/19  CONCLUSIONS:  Limited repeat study to evaluate for LV thrombus.  Endocardium not well visualized; grossly mild to moderate  segmental left ventricular systolic dysfunction.  Hypokinesis of the apex, distal septum, and distal anterior  wall.  Endocardial visualization enhanced with intravenous  injection of echo contrast (Definity).  No obvious LV  thrombus seen.    Cath:  3/29/19  INDICATIONS: Initial STEMI.  HISTORY: There was no prior cardiac history. The patient has hypertension  and medication-treated dyslipidemia.  PROCEDURE:  --  Left heart catheterization with ventriculography.  --  Left coronary angiography.  --  Right coronary angiography.  --  Intervention on distal LAD: drug-eluting stent.  VENTRICLES: EF estimated was 55 %.  CORONARY VESSELS: The coronary circulation is right dominant.  LM:   --  LM: Normal.  LAD:   --  Distal LAD: There was a 100 % stenosis.  CX:   --  Circumflex: Normal.  RCA:   --  RCA: Normal.  COMPLICATIONS: There were no complications.  DIAGNOSTIC RECOMMENDATIONS: ASA and Plavix for 1 year.  INTERVENTIONAL RECOMMENDATIONS: ASA and Plavix for 1 year.    cMRI:  4/5/19  IMPRESSION: Myocardial delayed hyper-enhancement involving the apex of the   left ventricle as described above compatible with infarcted myocardium.    No evidence of left ventricular thrombus.    Imaging:    CXR: Personally reviewed    Labs: Personally reviewed                        13.3   8.69  )-----------( 220      ( 15 Oct 2019 12:40 )             45.6     10-15    142  |  103  |  15  ----------------------------<  75  4.0   |  26  |  0.72    Ca    9.3      15 Oct 2019 12:40    TPro  7.4  /  Alb  3.9  /  TBili  0.2  /  DBili  x   /  AST  26  /  ALT  22  /  AlkPhos  120  10-15 Patient seen and evaluated at bedside    Chief Complaint: Chest pain    HPI:  Ms Day is a 52 yo F with a PMH significant for CAD s/p AWSTEMI March 2019 s/p MINDI to pLAD, fibromyalgia and asthma who presents with chest pain. Patient has had about 4 days of left sided chest pain that she characterizes as a burning and heaviness. It is non-radiating, nonexertional, not relieved with rest, non-pleuritic. She has not taken any OTC medications for it. The pain has been constant for 4 days with waxing and waning severity, 8/10 at its worst, currently a 1/10 after 3 SL nitros. She denies associated SOB but has experienced palpitations.     In the ED, /73, P 85, RR 18, O2 sat 95% on 3L nasal cannula. HsTrop 8 x 2. EKG NSR, TWI V3-V6(at baseline.)    PMHx:   STEMI (ST elevation myocardial infarction)  Fibromyalgia  Carpal Tunnel Syndrome  Asthma - never intubated  but hospitalized >5yrs ago      PSHx:   H/O: hysterectomy  S/P Oophorectomy right 2006  History of Dilatation and Curettage  left Knee  trauma  h/o repair 1985  History of Nasal Septoplasty and sinus surgery 2003      Allergies:  grass / pollen/ trees/ dust/ cats/ dogs------ sneezing/ wheezing/ itchy eyes/ itchy skin (Other)  NKDA      Home Meds:  ASA 81mg daily  clopidogrel 75mg daily  atorvastatin 40mg qhs  metoprolol succinate 25mg daily  cyclobenzaprine  duloxetine  montelukast  symbicort  ventolin  zolpidem    FAMILY HISTORY:  Noncontributory    Social History:  Smoking History: denies  Alcohol Use: denies  Drug Use: denies    REVIEW OF SYSTEMS:  CONSTITUTIONAL: No weakness, fevers or chills  EYES/ENT: No visual changes;  No dysphagia  NECK: No pain or stiffness  RESPIRATORY: No cough, wheezing, hemoptysis; No shortness of breath  CARDIOVASCULAR: +chest pain, palpitations; No lower extremity edema  GASTROINTESTINAL: No abdominal or epigastric pain. No nausea, vomiting, or hematemesis; No diarrhea or constipation. No melena or hematochezia.  GENITOURINARY: No dysuria, frequency or hematuria  NEUROLOGICAL: No numbness or weakness  SKIN: No itching, burning, rashes, or lesions   All other review of systems is negative unless indicated above.    Physical Exam:  T(F): 98.2 (10-15), Max: 98.2 (10-15)  HR: 60 (10-15) (60 - 85)  BP: 151/88 (10-15) (114/73 - 151/88)  RR: 16 (10-15)  SpO2: 99% (10-15)  GENERAL: No acute distress, well-developed  HEAD:  Atraumatic, Normocephalic  ENT: EOMI, PERRLA, conjunctiva and sclera clear, Neck supple, No JVD, moist mucosa  CHEST/LUNG: Clear to auscultation bilaterally; No wheeze, equal breath sounds bilaterally   HEART: Regular rate and rhythm; No murmurs, rubs, or gallops. point TTP in 4th and 5th ICS  ABDOMEN: Soft, Nontender, Nondistended; Bowel sounds present  EXTREMITIES:  No clubbing, cyanosis, or edema  PSYCH: Nl behavior, nl affect  NEUROLOGY: AAOx3, non-focal, cranial nerves intact  SKIN: Normal color, No rashes or lesions      Cardiovascular Diagnostic Testing:    ECG: Personally reviewed: NSR, TWI V3-V6(at baseline.)    Echo: Personally reviewed:  4/1/19  CONCLUSIONS:  Limited repeat study to evaluate for LV thrombus.  Endocardium not well visualized; grossly mild to moderate  segmental left ventricular systolic dysfunction.  Hypokinesis of the apex, distal septum, and distal anterior  wall.  Endocardial visualization enhanced with intravenous  injection of echo contrast (Definity).  No obvious LV  thrombus seen.    Cath:  3/29/19  INDICATIONS: Initial STEMI.  HISTORY: There was no prior cardiac history. The patient has hypertension  and medication-treated dyslipidemia.  PROCEDURE:  --  Left heart catheterization with ventriculography.  --  Left coronary angiography.  --  Right coronary angiography.  --  Intervention on distal LAD: drug-eluting stent.  VENTRICLES: EF estimated was 55 %.  CORONARY VESSELS: The coronary circulation is right dominant.  LM:   --  LM: Normal.  LAD:   --  Distal LAD: There was a 100 % stenosis.  CX:   --  Circumflex: Normal.  RCA:   --  RCA: Normal.  COMPLICATIONS: There were no complications.  DIAGNOSTIC RECOMMENDATIONS: ASA and Plavix for 1 year.  INTERVENTIONAL RECOMMENDATIONS: ASA and Plavix for 1 year.    cMRI:  4/5/19  IMPRESSION: Myocardial delayed hyper-enhancement involving the apex of the   left ventricle as described above compatible with infarcted myocardium.    No evidence of left ventricular thrombus.    Imaging:    CXR: Personally reviewed:    Labs: Personally reviewed                        13.3   8.69  )-----------( 220      ( 15 Oct 2019 12:40 )             45.6     10-15    142  |  103  |  15  ----------------------------<  75  4.0   |  26  |  0.72    Ca    9.3      15 Oct 2019 12:40    TPro  7.4  /  Alb  3.9  /  TBili  0.2  /  DBili  x   /  AST  26  /  ALT  22  /  AlkPhos  120  10-15

## 2019-10-15 NOTE — H&P ADULT - PROBLEM SELECTOR PLAN 5
Respiratory status stable, oxygenating well on room air  Continue Montelukast, Symbicort and Ventolin PRN

## 2019-10-15 NOTE — H&P ADULT - NSICDXPASTSURGICALHX_GEN_ALL_CORE_FT
PAST SURGICAL HISTORY:  S/P coronary artery stent placement     S/P D&C (status post dilation and curettage)     S/P hysterectomy     S/P left knee arthroscopy     S/P nasal septoplasty     S/P right oophorectomy

## 2019-10-15 NOTE — H&P ADULT - NEGATIVE NEUROLOGICAL SYMPTOMS
no confusion/no loss of sensation/no syncope/no tremors/no headache/no difficulty walking/no transient paralysis/no weakness/no generalized seizures/no paresthesias/no hemiparesis/no loss of consciousness/no focal seizures/no vertigo

## 2019-10-15 NOTE — H&P ADULT - NSICDXPASTMEDICALHX_GEN_ALL_CORE_FT
PAST MEDICAL HISTORY:  Asthma     CAD (coronary artery disease) S/P stent placement    CHF (congestive heart failure)     Fibromyalgia     HLD (hyperlipidemia)     STEMI (ST elevation myocardial infarction)

## 2019-10-15 NOTE — ED PROVIDER NOTE - CLINICAL SUMMARY MEDICAL DECISION MAKING FREE TEXT BOX
PGY2/MD Justin. 53 yo F with past CAD in 4/2019, p/w chest pain, x 3days, EKG with no ST elevation but T wave inversion, consistent with natural course given her LAD occlusion in April, clinical history not likely PE or dissection, will work up for ACS especially for stress test thah she missed, for that, pt likely needs admission.

## 2019-10-15 NOTE — ED ADULT NURSE NOTE - NSIMPLEMENTINTERV_GEN_ALL_ED
Implemented All Universal Safety Interventions:  [place] to call system. Call bell, personal items and telephone within reach. Instruct patient to call for assistance. Room bathroom lighting operational. Non-slip footwear when patient is off stretcher. Physically safe environment: no spills, clutter or unnecessary equipment. Stretcher in lowest position, wheels locked, appropriate side rails in place.

## 2019-10-15 NOTE — ED PROVIDER NOTE - ATTENDING CONTRIBUTION TO CARE
Dr. Potter: 55 yo female with asthma and fibromyalgia, cardiac cath with stents 6 months ago, in ED with 3 days of constant substernal chest pain, associated with nausea.  No acute EKG changes in ED today.  On exam pt overall well appearing, in NAD, heart RRR, lungs CTAB, abd NTND, extremities without swelling, strength 5/5 in all extremities and skin without rash.

## 2019-10-15 NOTE — H&P ADULT - RS GEN PE MLT RESP DETAILS PC
clear to auscultation bilaterally/good air movement/respirations non-labored/breath sounds equal/no rhonchi/chest wall tenderness/no wheezes/no intercostal retractions/airway patent/no rales

## 2019-10-15 NOTE — CONSULT NOTE ADULT - ASSESSMENT
54 yo F with a PMH significant for CAD s/p AWSTEMI March 2019 s/p MINDI to pLAD, fibromyalgia and asthma who presents with chest pain.    #Chest pain  - continue to trend cardiac enzymes  - continue home ASA 81mg daily, clopidogrel 75mg daily, atorvastatin 40mg qhs, metoprolol succinate 25mg daily    Aldair Garcia MD  Cardiology Fellow  [phone]  All Cardiology service information can be found 24/7 on [email or web address], password: [employer] A 52 yo F with a PMH significant for CAD s/p AWSTEMI March 2019 s/p MINDI to pLAD, fibromyalgia and asthma who presents with chest pain.    #Chest pain  Atypical. Although her chest pain responded to nitro, it is unclear if this was simply a placebo effect as the pain is very reproducible with point tenderness on exam, consistent with costochondritis. She has also had constant chest pain for 4 days with 2 negative troponins making a cardiac cause less likely. Despite this, a stress test is not unreasonable as her outpatient cardiologist had planned for one based on prior evaluation and patient was unable to complete it 2/2 knee pain.  - continue to trend cardiac enzymes  - nuclear stress test in am, if normal can be discharged  - tylenol PRN for pain control  - continue home ASA 81mg daily, clopidogrel 75mg daily, atorvastatin 40mg qhs, metoprolol succinate 25mg daily    Aldair Garcia MD  Cardiology Fellow  [phone]  All Cardiology service information can be found 24/7 on [email or web address], password: [employer]

## 2019-10-15 NOTE — ED PROVIDER NOTE - OBJECTIVE STATEMENT
PGY2/MD Justin. 53 yo F with h/o asthma and fibromyalgia, h/o concerning for VERO in lateral leads with elevated cardiac enzymes that rquired urgent  cardiac cath and MINDI X 1 was placed to 100% pLAD lesion, in 4/2019, who presented to the ED for left substernal chest pain, x 3 days, constant, 6-7/10, pressure pain, radiating to her left shoulder, accompanied with some nauseous, denies migrating pain or diaphoresis. Pt missed Stress test last Wednesday, Dr. Cronin (physician partner), is following up the pt. No recent travel, calfpain, or sob.

## 2019-10-15 NOTE — ED PROVIDER NOTE - PROGRESS NOTE DETAILS
PGY2/MD Justin. Chest pain, subsided, missed stress test last wednesday. Dr. Cronin's office called in but the doc is on vacation. house cards was consulted, pt needs cardiac, ACS work up, given her significant risk, not a good candidate for CDU, admission was accepted by Dr. Beard.

## 2019-10-15 NOTE — ED PROVIDER NOTE - PHYSICAL EXAMINATION
PGY2/MD Justin.   VITALS: reviewed  GEN: No apparent distress, A & O x 4  HEAD/EYES: NC/AT, PERRL, EOMI, anicteric sclerae, no conjunctival pallor  ENT: mucus membranes moist, oropharynx WNL, trachea midline, no JVD, neck is supple  RESP: lungs CTA with equal breath sounds bilaterally, chest wall nontender and atraumatic  CV: heart with reg rhythm S1, S2, no murmur; distal pulses intact and symmetric bilaterally  ABDOMEN: normoactive bowel sounds, soft, nondistended, nontender,  MSK: extremities atraumatic and nontender, no edema, no asymmetry   SKIN: warm, dry, no rash, no bruising, no cyanosis. color appropriate for ethnicity  NEURO: alert, mentating appropriately, no facial asymmetry.  PSYCH: Affect appropriate

## 2019-10-15 NOTE — ED ADULT TRIAGE NOTE - CHIEF COMPLAINT QUOTE
pt BIBA from home, pt c/o chest pain x 3 days.  pt received NTG x 3 and asa 162mg by EMS.  pt has PMH: cardiac stents, asthma, fibromyalgia

## 2019-10-16 ENCOUNTER — TRANSCRIPTION ENCOUNTER (OUTPATIENT)
Age: 54
End: 2019-10-16

## 2019-10-16 LAB
ALBUMIN SERPL ELPH-MCNC: 3.8 G/DL — SIGNIFICANT CHANGE UP (ref 3.3–5)
ALP SERPL-CCNC: 118 U/L — SIGNIFICANT CHANGE UP (ref 40–120)
ALT FLD-CCNC: 15 U/L — SIGNIFICANT CHANGE UP (ref 4–33)
ANION GAP SERPL CALC-SCNC: 15 MMO/L — HIGH (ref 7–14)
AST SERPL-CCNC: 13 U/L — SIGNIFICANT CHANGE UP (ref 4–32)
BILIRUB SERPL-MCNC: 0.6 MG/DL — SIGNIFICANT CHANGE UP (ref 0.2–1.2)
BUN SERPL-MCNC: 15 MG/DL — SIGNIFICANT CHANGE UP (ref 7–23)
CALCIUM SERPL-MCNC: 8.9 MG/DL — SIGNIFICANT CHANGE UP (ref 8.4–10.5)
CHLORIDE SERPL-SCNC: 101 MMOL/L — SIGNIFICANT CHANGE UP (ref 98–107)
CHOLEST SERPL-MCNC: 141 MG/DL — SIGNIFICANT CHANGE UP (ref 120–199)
CO2 SERPL-SCNC: 26 MMOL/L — SIGNIFICANT CHANGE UP (ref 22–31)
CREAT SERPL-MCNC: 0.73 MG/DL — SIGNIFICANT CHANGE UP (ref 0.5–1.3)
GLUCOSE SERPL-MCNC: 93 MG/DL — SIGNIFICANT CHANGE UP (ref 70–99)
HBA1C BLD-MCNC: 5.7 % — HIGH (ref 4–5.6)
HCG SERPL-ACNC: < 5 MIU/ML — SIGNIFICANT CHANGE UP
HCT VFR BLD CALC: 40.9 % — SIGNIFICANT CHANGE UP (ref 34.5–45)
HDLC SERPL-MCNC: 64 MG/DL — SIGNIFICANT CHANGE UP (ref 45–65)
HGB BLD-MCNC: 12.9 G/DL — SIGNIFICANT CHANGE UP (ref 11.5–15.5)
LIPID PNL WITH DIRECT LDL SERPL: 79 MG/DL — SIGNIFICANT CHANGE UP
MAGNESIUM SERPL-MCNC: 1.9 MG/DL — SIGNIFICANT CHANGE UP (ref 1.6–2.6)
MCHC RBC-ENTMCNC: 25.9 PG — LOW (ref 27–34)
MCHC RBC-ENTMCNC: 31.5 % — LOW (ref 32–36)
MCV RBC AUTO: 82.1 FL — SIGNIFICANT CHANGE UP (ref 80–100)
NRBC # FLD: 0 K/UL — SIGNIFICANT CHANGE UP (ref 0–0)
PHOSPHATE SERPL-MCNC: 4.2 MG/DL — SIGNIFICANT CHANGE UP (ref 2.5–4.5)
PLATELET # BLD AUTO: 225 K/UL — SIGNIFICANT CHANGE UP (ref 150–400)
PMV BLD: 11.4 FL — SIGNIFICANT CHANGE UP (ref 7–13)
POTASSIUM SERPL-MCNC: 3.4 MMOL/L — LOW (ref 3.5–5.3)
POTASSIUM SERPL-SCNC: 3.4 MMOL/L — LOW (ref 3.5–5.3)
PROT SERPL-MCNC: 6.7 G/DL — SIGNIFICANT CHANGE UP (ref 6–8.3)
RBC # BLD: 4.98 M/UL — SIGNIFICANT CHANGE UP (ref 3.8–5.2)
RBC # FLD: 15 % — HIGH (ref 10.3–14.5)
SODIUM SERPL-SCNC: 142 MMOL/L — SIGNIFICANT CHANGE UP (ref 135–145)
TRIGL SERPL-MCNC: 51 MG/DL — SIGNIFICANT CHANGE UP (ref 10–149)
TSH SERPL-MCNC: 2.85 UIU/ML — SIGNIFICANT CHANGE UP (ref 0.27–4.2)
WBC # BLD: 7.76 K/UL — SIGNIFICANT CHANGE UP (ref 3.8–10.5)
WBC # FLD AUTO: 7.76 K/UL — SIGNIFICANT CHANGE UP (ref 3.8–10.5)

## 2019-10-16 PROCEDURE — 78452 HT MUSCLE IMAGE SPECT MULT: CPT | Mod: 26

## 2019-10-16 PROCEDURE — 93018 CV STRESS TEST I&R ONLY: CPT | Mod: GC

## 2019-10-16 PROCEDURE — 99222 1ST HOSP IP/OBS MODERATE 55: CPT

## 2019-10-16 PROCEDURE — 93016 CV STRESS TEST SUPVJ ONLY: CPT | Mod: GC

## 2019-10-16 PROCEDURE — 99232 SBSQ HOSP IP/OBS MODERATE 35: CPT

## 2019-10-16 RX ORDER — POTASSIUM CHLORIDE 20 MEQ
20 PACKET (EA) ORAL ONCE
Refills: 0 | Status: COMPLETED | OUTPATIENT
Start: 2019-10-16 | End: 2019-10-16

## 2019-10-16 RX ORDER — DIPHENHYDRAMINE HCL 50 MG
25 CAPSULE ORAL EVERY 6 HOURS
Refills: 0 | Status: DISCONTINUED | OUTPATIENT
Start: 2019-10-16 | End: 2019-10-17

## 2019-10-16 RX ORDER — HYDROCORTISONE 1 %
1 OINTMENT (GRAM) TOPICAL
Refills: 0 | Status: DISCONTINUED | OUTPATIENT
Start: 2019-10-16 | End: 2019-10-17

## 2019-10-16 RX ADMIN — Medication 1 APPLICATION(S): at 18:27

## 2019-10-16 RX ADMIN — CLOPIDOGREL BISULFATE 75 MILLIGRAM(S): 75 TABLET, FILM COATED ORAL at 12:18

## 2019-10-16 RX ADMIN — MONTELUKAST 10 MILLIGRAM(S): 4 TABLET, CHEWABLE ORAL at 21:49

## 2019-10-16 RX ADMIN — BUDESONIDE AND FORMOTEROL FUMARATE DIHYDRATE 2 PUFF(S): 160; 4.5 AEROSOL RESPIRATORY (INHALATION) at 21:48

## 2019-10-16 RX ADMIN — DULOXETINE HYDROCHLORIDE 60 MILLIGRAM(S): 30 CAPSULE, DELAYED RELEASE ORAL at 12:18

## 2019-10-16 RX ADMIN — ENOXAPARIN SODIUM 40 MILLIGRAM(S): 100 INJECTION SUBCUTANEOUS at 12:17

## 2019-10-16 RX ADMIN — Medication 20 MILLIEQUIVALENT(S): at 10:22

## 2019-10-16 RX ADMIN — BUDESONIDE AND FORMOTEROL FUMARATE DIHYDRATE 2 PUFF(S): 160; 4.5 AEROSOL RESPIRATORY (INHALATION) at 10:23

## 2019-10-16 RX ADMIN — ZOLPIDEM TARTRATE 5 MILLIGRAM(S): 10 TABLET ORAL at 00:22

## 2019-10-16 RX ADMIN — Medication 81 MILLIGRAM(S): at 12:18

## 2019-10-16 RX ADMIN — ATORVASTATIN CALCIUM 40 MILLIGRAM(S): 80 TABLET, FILM COATED ORAL at 21:49

## 2019-10-16 RX ADMIN — Medication 25 MILLIGRAM(S): at 05:50

## 2019-10-16 NOTE — DISCHARGE NOTE PROVIDER - NSDCCPCAREPLAN_GEN_ALL_CORE_FT
PRINCIPAL DISCHARGE DIAGNOSIS  Diagnosis: Chest pain, unspecified type  Assessment and Plan of Treatment: You came to the hospital with chest pain. Your cardiac enzymes were negative. Cardiology evaluated you and recommended you have a nuclear stress test .... PRINCIPAL DISCHARGE DIAGNOSIS  Diagnosis: Chest pain, unspecified type  Assessment and Plan of Treatment: Continue aspirin and Plavix, do not stop unless instructed by your physician.  Continue low salt, fat, cholesterol and carbohydrate diet. Follow up with cardiologist and primary care physician's routine appointment. Dr Pak. You had a nuclear stress test which showed abnormalities associated with your last heart attack. Cardiology has cleared you for discharge and you should call Dr Pak for an appointment      SECONDARY DISCHARGE DIAGNOSES  Diagnosis: HLD (hyperlipidemia)  Assessment and Plan of Treatment: Low fat diet, exercise daily and continue current medications. Follow up with primary care physician and cardiologist for management.    Diagnosis: CHF (congestive heart failure)  Assessment and Plan of Treatment: Low salt diet, fluid restriction to 1500 ml daily, monitor your fluid intake and weight daily, exercise as tolerated 30 minutes daily, and follow up with your physician routinely. If you expereince any shortness of breath, chest pain, or other symptoms please see your doctor    Diagnosis: Asthma  Assessment and Plan of Treatment: Continue current medications as prescribed.  Avoid exposures to environmental allergens such as carpets, pets and both first-hand and second-hand smoking.  During seasonal allergy period, take a shower as soon as you get home and change your clothes immediately.  Follow up your routine medical appointments. Continue with montelukast, symbicort, and ventolin as prescribed

## 2019-10-16 NOTE — DISCHARGE NOTE PROVIDER - CARE PROVIDER_API CALL
Roberta Cronin)  Cardiovascular Disease  [facility], [address] [place] [address] [address] [place] [address]  Phone: [phone]  Fax: [phone]  Follow Up Time:     Dr. Jacob Brice (PCP),   Phone: (   )    -  Fax: (   )    -  Follow Up Time:

## 2019-10-16 NOTE — DISCHARGE NOTE PROVIDER - NSDCFUSCHEDAPPT_GEN_ALL_CORE_FT
MATEO SALAZAR ; 11/05/2019 ; [facility] [address]  MATEO SALAZAR ; 11/11/2019 ; [facility] [address] MATEO SALAZAR ; 11/05/2019 ; [facility] [address]  MATEO SALAZAR ; 11/11/2019 ; [facility] [address] MATEO SALAZAR ; 11/05/2019 ; [facility] [address]  MATEO SALAZAR ; 11/11/2019 ; [facility] [address]

## 2019-10-16 NOTE — DISCHARGE NOTE PROVIDER - PROVIDER TOKENS
PROVIDER:[TOKEN:[[id number]]],FREE:[LAST:[Dr. Jacob Brice (PCP)],PHONE:[(   )    -],FAX:[(   )    -]]

## 2019-10-16 NOTE — DISCHARGE NOTE PROVIDER - HOSPITAL COURSE
55 y/o female with a PMHx of AWSTEMI with CAD S/P MINDI to % in March 2019, ischemic cardiomyopathy with improved LV function, HLD, asthma and fibromyalgia presents to ED with chest pain and shortness of breath, which is atypical in nature. EKG revealed NSR at 63 bpm with TWI III, AVF, V1-V6. Troponins negative. Patient ordered for NST per cardiology. NST revealed....         Meds:        Case discussed with Dr. Santiago on ..... The patient is medically stable for discharge and has appropriate follow up. 54 yo F with a PMH significant for CAD s/p AWSTEMI March 2019 s/p MINDI to pLAD, fibromyalgia and asthma who presents with chest pain.        #Chest pain    Atypical. Although her chest pain responded to nitro, it is unclear if this was simply a placebo effect as the pain is very reproducible with point tenderness on exam, consistent with costochondritis. She has also had constant chest pain for 4 days with 2 negative troponins making a cardiac cause less likely. Despite this, a stress test is not unreasonable as her outpatient cardiologist had planned for one based on prior evaluation and patient was unable to complete it 2/2 knee pain.    -  nuclear stress with fixed apical defect that is likely from previous MI     - tylenol PRN for pain control    - continue home ASA 81mg daily, clopidogrel 75mg daily, atorvastatin 40mg qhs, metoprolol succinate 25mg daily        CHF (congestive heart failure).  Plan: chronic. euvolemic. ischemic cardiomyopathy with recent EF 62%: Continue Metoprolol lifestyle modifications.         Asthma: Respiratory status stable, oxygenating well on room air    Continue Montelukast, Symbicort and Ventolin PRN.             Stable for discharge home from cardiac standpoint. Dr Palmer aware

## 2019-10-17 ENCOUNTER — TRANSCRIPTION ENCOUNTER (OUTPATIENT)
Age: 54
End: 2019-10-17

## 2019-10-17 VITALS
HEART RATE: 74 BPM | RESPIRATION RATE: 18 BRPM | OXYGEN SATURATION: 100 % | SYSTOLIC BLOOD PRESSURE: 117 MMHG | DIASTOLIC BLOOD PRESSURE: 74 MMHG

## 2019-10-17 LAB
ANION GAP SERPL CALC-SCNC: 11 MMO/L — SIGNIFICANT CHANGE UP (ref 7–14)
BUN SERPL-MCNC: 14 MG/DL — SIGNIFICANT CHANGE UP (ref 7–23)
CALCIUM SERPL-MCNC: 8.9 MG/DL — SIGNIFICANT CHANGE UP (ref 8.4–10.5)
CHLORIDE SERPL-SCNC: 101 MMOL/L — SIGNIFICANT CHANGE UP (ref 98–107)
CO2 SERPL-SCNC: 29 MMOL/L — SIGNIFICANT CHANGE UP (ref 22–31)
CREAT SERPL-MCNC: 0.73 MG/DL — SIGNIFICANT CHANGE UP (ref 0.5–1.3)
GLUCOSE SERPL-MCNC: 86 MG/DL — SIGNIFICANT CHANGE UP (ref 70–99)
HCT VFR BLD CALC: 41.6 % — SIGNIFICANT CHANGE UP (ref 34.5–45)
HGB BLD-MCNC: 12.5 G/DL — SIGNIFICANT CHANGE UP (ref 11.5–15.5)
MAGNESIUM SERPL-MCNC: 2 MG/DL — SIGNIFICANT CHANGE UP (ref 1.6–2.6)
MCHC RBC-ENTMCNC: 25.4 PG — LOW (ref 27–34)
MCHC RBC-ENTMCNC: 30 % — LOW (ref 32–36)
MCV RBC AUTO: 84.4 FL — SIGNIFICANT CHANGE UP (ref 80–100)
NRBC # FLD: 0 K/UL — SIGNIFICANT CHANGE UP (ref 0–0)
PHOSPHATE SERPL-MCNC: 4.2 MG/DL — SIGNIFICANT CHANGE UP (ref 2.5–4.5)
PLATELET # BLD AUTO: 224 K/UL — SIGNIFICANT CHANGE UP (ref 150–400)
PMV BLD: 11.8 FL — SIGNIFICANT CHANGE UP (ref 7–13)
POTASSIUM SERPL-MCNC: 3.6 MMOL/L — SIGNIFICANT CHANGE UP (ref 3.5–5.3)
POTASSIUM SERPL-SCNC: 3.6 MMOL/L — SIGNIFICANT CHANGE UP (ref 3.5–5.3)
RBC # BLD: 4.93 M/UL — SIGNIFICANT CHANGE UP (ref 3.8–5.2)
RBC # FLD: 15 % — HIGH (ref 10.3–14.5)
SODIUM SERPL-SCNC: 141 MMOL/L — SIGNIFICANT CHANGE UP (ref 135–145)
WBC # BLD: 7.21 K/UL — SIGNIFICANT CHANGE UP (ref 3.8–10.5)
WBC # FLD AUTO: 7.21 K/UL — SIGNIFICANT CHANGE UP (ref 3.8–10.5)

## 2019-10-17 PROCEDURE — 99232 SBSQ HOSP IP/OBS MODERATE 35: CPT

## 2019-10-17 RX ORDER — POTASSIUM CHLORIDE 20 MEQ
40 PACKET (EA) ORAL ONCE
Refills: 0 | Status: COMPLETED | OUTPATIENT
Start: 2019-10-17 | End: 2019-10-17

## 2019-10-17 RX ADMIN — Medication 1 APPLICATION(S): at 05:39

## 2019-10-17 RX ADMIN — CLOPIDOGREL BISULFATE 75 MILLIGRAM(S): 75 TABLET, FILM COATED ORAL at 11:45

## 2019-10-17 RX ADMIN — DULOXETINE HYDROCHLORIDE 60 MILLIGRAM(S): 30 CAPSULE, DELAYED RELEASE ORAL at 11:45

## 2019-10-17 RX ADMIN — Medication 81 MILLIGRAM(S): at 11:44

## 2019-10-17 RX ADMIN — Medication 40 MILLIEQUIVALENT(S): at 11:51

## 2019-10-17 RX ADMIN — ZOLPIDEM TARTRATE 5 MILLIGRAM(S): 10 TABLET ORAL at 00:56

## 2019-10-17 RX ADMIN — Medication 25 MILLIGRAM(S): at 05:39

## 2019-10-17 NOTE — DISCHARGE NOTE NURSING/CASE MANAGEMENT/SOCIAL WORK - PATIENT PORTAL LINK FT
You can access the FollowMyHealth Patient Portal offered by [facility] by registering at the following website: [email or web address]. By joining [employer]’s FollowMyHealth portal, you will also be able to view your health information using other applications (apps) compatible with our system.

## 2019-10-17 NOTE — PROGRESS NOTE ADULT - PROBLEM SELECTOR PLAN 5
Respiratory status stable, oxygenating well on room air  Continue Montelukast, Symbicort and Ventolin PRN
Respiratory status stable, oxygenating well on room air  Continue Montelukast, Symbicort and Ventolin PRN

## 2019-10-17 NOTE — PROGRESS NOTE ADULT - PROBLEM SELECTOR PLAN 1
persist symptoms  ->F/u stress test  ->F/u cards for management  additional management (ie:cardiac cath vs conservative medical) vs d/c planning home pending above
Pt with atypical chest pain based on clinical story  EKG shows NSR at 63 bpm with TWI III, AVF, V1-V6  Delta troponin negative 8-->8  Cards consult appreciated (house)  Dc pending stress test, if neg for will dc home

## 2019-10-17 NOTE — PROGRESS NOTE ADULT - SUBJECTIVE AND OBJECTIVE BOX
Patient seen and examined at bedside  No acute events noted overnight  Case discussed with medical team    HPI:  55 y/o female with a PMHx of AWSTEMI with CAD S/P MINDI to % in 2019, ischemic cardiomyopathy with improved LV function, HLD, asthma and fibromyalgia presents to ED with chest pain and shortness of breath. Pt has been experiencing constant, non-pleuritic, non-exertional, substernal chest pain radiating to the neck and left shoulder, associated with tingling and "fatigue" of the left shoulder. Pt states that the symptoms have been going on for 4 days with no provoking factors, relatively constant but fluctuating in intensity. Pt describes the chest pain as a "heaviness" and denies any exertional or positional component. Pt does admit that her chest pain is made worse when she lays down on her left side. Pt also reports nausea and shortness of breath and dyspnea on exertion over the past few days, for which she has been using her inhalers more than usual. Pt states that her pain is similar to the pain she experienced when the stent was placed but she did not have neck or shoulder pain at that time, like she does now. Pt does not report any injury to the chest wall or any recent illness. Pt was supposed to have a nuclear stress test in 2019, but never did it. Pt denies fever, chills, recent travel, headache, dizziness, visual deficits, orthopnea, palpitations, abdominal pain, V/D/C, hematochezia, melena, dysuria, hematuria, LOC, syncope, peripheral edema. Upon arrival to ED, EKG: NSR at 63 bpm with TWI III, AVF, V1-V6. CE x2: Trop 8-->8. Pt is admitted to telemetry. (15 Oct 2019 16:10)      PAST MEDICAL & SURGICAL HISTORY:  HLD (hyperlipidemia)  CHF (congestive heart failure)  CAD (coronary artery disease): S/P stent placement  Asthma  STEMI (ST elevation myocardial infarction)  Fibromyalgia  S/P nasal septoplasty  S/P D&C (status post dilation and curettage)  S/P left knee arthroscopy  S/P right oophorectomy  S/P hysterectomy  S/P coronary artery stent placement      grass / pollen/ trees/ dust/ cats/ dogs------ sneezing/ wheezing/ itchy eyes/ itchy skin (Other)  No Known Drug Allergies       MEDICATIONS  (STANDING):  aspirin  chewable 81 milliGRAM(s) Oral daily  atorvastatin 40 milliGRAM(s) Oral at bedtime  buDESOnide 160 MICROgram(s)/formoterol 4.5 MICROgram(s) Inhaler 2 Puff(s) Inhalation two times a day  clopidogrel Tablet 75 milliGRAM(s) Oral daily  DULoxetine 60 milliGRAM(s) Oral daily  enoxaparin Injectable 40 milliGRAM(s) SubCutaneous daily  hydrocortisone 2.5% Ointment 1 Application(s) Topical two times a day  metoprolol succinate ER 25 milliGRAM(s) Oral daily  montelukast 10 milliGRAM(s) Oral at bedtime  potassium chloride    Tablet ER 40 milliEquivalent(s) Oral once    MEDICATIONS  (PRN):  ALBUTerol    90 MICROgram(s) HFA Inhaler 2 Puff(s) Inhalation every 6 hours PRN Shortness of Breath and/or Wheezing  cyclobenzaprine 10 milliGRAM(s) Oral three times a day PRN Muscle Spasm  diphenhydrAMINE 25 milliGRAM(s) Oral every 6 hours PRN Rash and/or Itching  zolpidem 5 milliGRAM(s) Oral at bedtime PRN Insomnia      REVIEW OF SYSTEMS:  CONSTITUTIONAL: (+) malaise.   EYES: No acute change in vision   ENT:  No tinnitus  NECK: No stiffness  RESPIRATORY: No hemoptysis  CARDIOVASCULAR: No chest pain, palpitations, syncope  GASTROINTESTINAL: No hematemesis, diarrhea, melena, or hematochezia.  GENITOURINARY: No hematuria  NEUROLOGICAL: No headaches  LYMPH Nodes: No enlarged glands  ENDOCRINE: No heat or cold intolerance	    T(C): 36.7 (10-17-19 @ 05:37), Max: 36.7 (10-16-19 @ 12:15)  HR: 72 (10-17-19 @ 05:37) (71 - 72)  BP: 122/74 (10-17-19 @ 05:37) (114/89 - 122/74)  RR: 18 (10-17-19 @ 05:37) (17 - 18)  SpO2: 100% (10-17-19 @ 05:37) (98% - 100%)    PHYSICAL EXAMINATION:   Constitutional: WD, NAD  HEENT: NC, AT  Neck:  Supple  Respiratory:  Adequate airflow b/l. Not using accessory muscles of respiration.  Cardiovascular:  S1 & S2 intact, no R/G, 2+ radial pulses b/l  Gastrointestinal: Soft, NT, ND, normoactive b.s., no organomegaly/RT/rigidity  Extremities: WWP  Neurological:  Alert and awake.  No acute focal motor deficits. Crude sensation intact.     Labs and imaging reviewed    LABS:                        12.5   7.21  )-----------( 224      ( 17 Oct 2019 06:00 )             41.6     10-17    141  |  101  |  14  ----------------------------<  86  3.6   |  29  |  0.73    Ca    8.9      17 Oct 2019 06:00  Phos  4.2     10-  Mg     2.0     10-    TPro  6.7  /  Alb  3.8  /  TBili  0.6  /  DBili  x   /  AST  13  /  ALT  15  /  AlkPhos  118  10-16          Urinalysis Basic - ( 15 Oct 2019 12:30 )    Color: YELLOW / Appearance: CLEAR / S.032 / pH: 6.0  Gluc: NEGATIVE / Ketone: NEGATIVE  / Bili: NEGATIVE / Urobili: NORMAL   Blood: SMALL / Protein: 20 / Nitrite: NEGATIVE   Leuk Esterase: NEGATIVE / RBC: 6-10 / WBC 3-5   Sq Epi: FEW / Non Sq Epi: x / Bacteria: NEGATIVE      CAPILLARY BLOOD GLUCOSE            LIVER FUNCTIONS - ( 16 Oct 2019 06:30 )  Alb: 3.8 g/dL / Pro: 6.7 g/dL / ALK PHOS: 118 u/L / ALT: 15 u/L / AST: 13 u/L / GGT: x               RADIOLOGY & ADDITIONAL STUDIES:
Patient seen and examined at bedside.    Overnight Events: NAEO, stress negative, continues to feel mild discomfort.      Current Meds:  ALBUTerol    90 MICROgram(s) HFA Inhaler 2 Puff(s) Inhalation every 6 hours PRN  aspirin  chewable 81 milliGRAM(s) Oral daily  atorvastatin 40 milliGRAM(s) Oral at bedtime  buDESOnide 160 MICROgram(s)/formoterol 4.5 MICROgram(s) Inhaler 2 Puff(s) Inhalation two times a day  clopidogrel Tablet 75 milliGRAM(s) Oral daily  cyclobenzaprine 10 milliGRAM(s) Oral three times a day PRN  diphenhydrAMINE 25 milliGRAM(s) Oral every 6 hours PRN  DULoxetine 60 milliGRAM(s) Oral daily  enoxaparin Injectable 40 milliGRAM(s) SubCutaneous daily  hydrocortisone 2.5% Ointment 1 Application(s) Topical two times a day  metoprolol succinate ER 25 milliGRAM(s) Oral daily  montelukast 10 milliGRAM(s) Oral at bedtime  potassium chloride    Tablet ER 40 milliEquivalent(s) Oral once  zolpidem 5 milliGRAM(s) Oral at bedtime PRN      Vitals:  T(F): 98 (10-17), Max: 98 (10-16)  HR: 72 (10-17) (71 - 72)  BP: 122/74 (10-17) (114/89 - 122/74)  RR: 18 (10-17)  SpO2: 100% (10-17)  I&O's Summary      Physical Exam:  Appearance: No acute distress; well appearing  HEENT:  EOMI, sclera anicteric, Normal oral mucosa  Cardiovascular: RRR, S1, S2, no murmurs, rubs, or gallops; no edema; no JVD  Respiratory: Clear to auscultation bilaterally, no wheezes, rales, rhonchi  Gastrointestinal: soft, non-tender, non-distended with normal bowel sounds  Musculoskeletal: No clubbing; no joint deformity   Neurologic: Non-focal  Lymphatic: No lymphadenopathy  Psychiatry: AAOx3, mood & affect appropriate  Skin: No rashes, ecchymoses, or cyanosis                          12.5   7.21  )-----------( 224      ( 17 Oct 2019 06:00 )             41.6     10-17    141  |  101  |  14  ----------------------------<  86  3.6   |  29  |  0.73    Ca    8.9      17 Oct 2019 06:00  Phos  4.2     10-17  Mg     2.0     10-17    TPro  6.7  /  Alb  3.8  /  TBili  0.6  /  DBili  x   /  AST  13  /  ALT  15  /  AlkPhos  118  10-16      ECG: Personally reviewed: NSR, TWI V3-V6(at baseline.)    Echo: Personally reviewed:  4/1/19  CONCLUSIONS:  Limited repeat study to evaluate for LV thrombus.  Endocardium not well visualized; grossly mild to moderate  segmental left ventricular systolic dysfunction.  Hypokinesis of the apex, distal septum, and distal anterior  wall.  Endocardial visualization enhanced with intravenous  injection of echo contrast (Definity).  No obvious LV  thrombus seen.    Stress:  10/16/19  IMPRESSIONS:Abnormal Study  * Myocardial Perfusion SPECT results are abnormal.  * Review of raw data shows: The study is of good technical  quality.  * The left ventricle was normal in size. There is a small,  severe defect in apical and distal inferior wall that  appears fixed suggestive of infarct.  * Post-stress gated wall motion analysis was performed  (LVEF = 56 %;LVEDV = 111 ml.), revealing normal LV  function, despite segmental wall motion abnormality. There  was focal apical akinesis. The remaining segments and RV  contracted well.  * No clear evidence of ischemia.      Cath:  3/29/19  INDICATIONS: Initial STEMI.  HISTORY: There was no prior cardiac history. The patient has hypertension  and medication-treated dyslipidemia.  PROCEDURE:  --  Left heart catheterization with ventriculography.  --  Left coronary angiography.  --  Right coronary angiography.  --  Intervention on distal LAD: drug-eluting stent.  VENTRICLES: EF estimated was 55 %.  CORONARY VESSELS: The coronary circulation is right dominant.  LM:   --  LM: Normal.  LAD:   --  Distal LAD: There was a 100 % stenosis.  CX:   --  Circumflex: Normal.  RCA:   --  RCA: Normal.  COMPLICATIONS: There were no complications.  DIAGNOSTIC RECOMMENDATIONS: ASA and Plavix for 1 year.  INTERVENTIONAL RECOMMENDATIONS: ASA and Plavix for 1 year.    cMRI:  4/5/19  IMPRESSION: Myocardial delayed hyper-enhancement involving the apex of the   left ventricle as described above compatible with infarcted myocardium.    No evidence of left ventricular thrombus.      Interpretation of Telemetry: sinus 70s
Patient is a 54y old  Female who presents with a chief complaint of Chest pain (16 Oct 2019 10:28)      SUBJECTIVE / OVERNIGHT EVENTS: Improved CP, no SOB. No fever or chills    MEDICATIONS  (STANDING):  aspirin  chewable 81 milliGRAM(s) Oral daily  atorvastatin 40 milliGRAM(s) Oral at bedtime  buDESOnide 160 MICROgram(s)/formoterol 4.5 MICROgram(s) Inhaler 2 Puff(s) Inhalation two times a day  clopidogrel Tablet 75 milliGRAM(s) Oral daily  DULoxetine 60 milliGRAM(s) Oral daily  enoxaparin Injectable 40 milliGRAM(s) SubCutaneous daily  metoprolol succinate ER 25 milliGRAM(s) Oral daily  montelukast 10 milliGRAM(s) Oral at bedtime    MEDICATIONS  (PRN):  ALBUTerol    90 MICROgram(s) HFA Inhaler 2 Puff(s) Inhalation every 6 hours PRN Shortness of Breath and/or Wheezing  cyclobenzaprine 10 milliGRAM(s) Oral three times a day PRN Muscle Spasm  zolpidem 5 milliGRAM(s) Oral at bedtime PRN Insomnia      T(C): 36.7 (10-16-19 @ 12:15), Max: 36.8 (10-15-19 @ 18:08)  HR: 71 (10-16-19 @ 12:15) (60 - 71)  BP: 114/89 (10-16-19 @ 12:15) (114/89 - 151/88)  RR: 17 (10-16-19 @ 12:15) (16 - 18)  SpO2: 98% (10-16-19 @ 12:15) (98% - 100%)  CAPILLARY BLOOD GLUCOSE        I&O's Summary      PHYSICAL EXAM:  GENERAL: NAD,   HEENT: neck supple, mmm   CHEST/LUNG: Clear to auscultation bilaterally; No wheeze  HEART:  s1 s2 + Regular rate and rhythm;   ABDOMEN: Soft, Nontender, Nondistended; Bowel sounds present  EXTREMITIES:   No clubbing, cyanosis  NEURO: AAOx3      LABS:                        12.9   7.76  )-----------( 225      ( 16 Oct 2019 06:30 )             40.9     10-16    142  |  101  |  15  ----------------------------<  93  3.4<L>   |  26  |  0.73    Ca    8.9      16 Oct 2019 06:30  Phos  4.2     10-16  Mg     1.9     10-16    TPro  6.7  /  Alb  3.8  /  TBili  0.6  /  DBili  x   /  AST  13  /  ALT  15  /  AlkPhos  118  10-16          Urinalysis Basic - ( 15 Oct 2019 12:30 )    Color: YELLOW / Appearance: CLEAR / S.032 / pH: 6.0  Gluc: NEGATIVE / Ketone: NEGATIVE  / Bili: NEGATIVE / Urobili: NORMAL   Blood: SMALL / Protein: 20 / Nitrite: NEGATIVE   Leuk Esterase: NEGATIVE / RBC: 6-10 / WBC 3-5   Sq Epi: FEW / Non Sq Epi: x / Bacteria: NEGATIVE          Consultant(s) Notes Reviewed:  Cardiology      Jose Santiago MD  Hospitalist  P/ [phone]

## 2019-10-17 NOTE — PROGRESS NOTE ADULT - PROBLEM SELECTOR PLAN 3
chronic. euvolemic. ischemic cardiomyopathy with recent EF 62%  Continue Metoprolol  lifestyle modifications
Pt has a history of ischemic cardiomyopathy however recent cardiac MRI shows improvement in LV function with EF 62%  Pt without evidence of fluid overload at this time  Continue Metoprolol

## 2019-10-17 NOTE — PROGRESS NOTE ADULT - ASSESSMENT
52 yo F with a PMH significant for CAD s/p AWSTEMI March 2019 s/p MINDI to pLAD, fibromyalgia and asthma who presents with chest pain.    #Chest pain  Atypical. Although her chest pain responded to nitro, it is unclear if this was simply a placebo effect as the pain is very reproducible with point tenderness on exam, consistent with costochondritis. She has also had constant chest pain for 4 days with 2 negative troponins making a cardiac cause less likely. Despite this, a stress test is not unreasonable as her outpatient cardiologist had planned for one based on prior evaluation and patient was unable to complete it 2/2 knee pain.  - continue to trend cardiac enzymes  - nuclear stress with fixed apical defect  - tylenol PRN for pain control  - continue home ASA 81mg daily, clopidogrel 75mg daily, atorvastatin 40mg qhs, metoprolol succinate 25mg daily    Stable for discharge home from cardiac standpoint.     Aldair Garcia MD  Cardiology Fellow  [phone]  All Cardiology service information can be found 24/7 on [email or web address], password: [email or web address]
55 y/o female with a PMHx of AWSTEMI with CAD S/P MINDI to % in March 2019, ischemic cardiomyopathy with improved LV function, HLD, asthma and fibromyalgia presents to ED with chest pain and shortness of breath, which is atypical in nature.
55 y/o female with a PMHx of AWSTEMI with CAD S/P MINDI to % in March 2019, ischemic cardiomyopathy with improved LV function, HLD, asthma and fibromyalgia presents to ED with chest pain and shortness of breath, which is atypical in nature.

## 2019-10-17 NOTE — PROGRESS NOTE ADULT - PROBLEM SELECTOR PLAN 2
Continue ASA, Plavix, Atorvastatin and Metoprolol  ->Pharm NST results pending  DASH diet
Continue ASA, Plavix, Atorvastatin and Metoprolol  Pharm NST results pending  DASH diet

## 2019-10-30 ENCOUNTER — APPOINTMENT (OUTPATIENT)
Dept: CV DIAGNOSTICS | Facility: HOSPITAL | Age: 54
End: 2019-10-30

## 2019-11-05 ENCOUNTER — APPOINTMENT (OUTPATIENT)
Dept: CV DIAGNOSTICS | Facility: HOSPITAL | Age: 54
End: 2019-11-05

## 2019-12-09 ENCOUNTER — APPOINTMENT (OUTPATIENT)
Dept: CARDIOLOGY | Facility: CLINIC | Age: 54
End: 2019-12-09

## 2019-12-10 PROBLEM — I50.9 HEART FAILURE, UNSPECIFIED: Chronic | Status: ACTIVE | Noted: 2019-10-15

## 2019-12-10 PROBLEM — I25.10 ATHEROSCLEROTIC HEART DISEASE OF NATIVE CORONARY ARTERY WITHOUT ANGINA PECTORIS: Chronic | Status: ACTIVE | Noted: 2019-10-15

## 2019-12-10 PROBLEM — J45.909 UNSPECIFIED ASTHMA, UNCOMPLICATED: Chronic | Status: ACTIVE | Noted: 2019-10-15

## 2019-12-10 PROBLEM — E78.5 HYPERLIPIDEMIA, UNSPECIFIED: Chronic | Status: ACTIVE | Noted: 2019-10-15

## 2019-12-23 ENCOUNTER — APPOINTMENT (OUTPATIENT)
Dept: CARDIOLOGY | Facility: CLINIC | Age: 54
End: 2019-12-23
Payer: COMMERCIAL

## 2019-12-23 ENCOUNTER — NON-APPOINTMENT (OUTPATIENT)
Age: 54
End: 2019-12-23

## 2019-12-23 VITALS
HEART RATE: 84 BPM | SYSTOLIC BLOOD PRESSURE: 108 MMHG | OXYGEN SATURATION: 95 % | HEIGHT: 67 IN | WEIGHT: 215 LBS | DIASTOLIC BLOOD PRESSURE: 71 MMHG | BODY MASS INDEX: 33.74 KG/M2

## 2019-12-23 PROCEDURE — 93000 ELECTROCARDIOGRAM COMPLETE: CPT

## 2019-12-23 PROCEDURE — 99214 OFFICE O/P EST MOD 30 MIN: CPT

## 2019-12-23 NOTE — HISTORY OF PRESENT ILLNESS
[FreeTextEntry1] : Here for followup. Last seen in August. Had nuclear stress test in October with results as follows:\par • Myocardial Perfusion SPECT results are abnormal. • Review of raw data shows: The study is of good technical quality. • The left ventricle was normal in size. There is a small, severe defect in apical and distal inferior wall that appears fixed suggestive of infarct. • Post-stress gated wall motion analysis was performed (LVEF = 56 %;LVEDV = 111 ml.), revealing normal LV function, despite segmental wall motion abnormality. There was focal apical akinesis. The remaining segments and RV contracted well. • No clear evidence of ischemia.\par 1. CAD- AWMI with PCI dLAD; Nuclear stress test in October 2019 that showed fixed apical and distal inferior defects/ EKG reviewed and unchanged. On ASA and Plavix. Condition is stable. Continue present meds\par 2. HTN- On Metoprolol, continue present meds\par 3. H:D- On statin therapy, continue present meds

## 2019-12-23 NOTE — PHYSICAL EXAM
[General Appearance - Well Developed] : well developed [Normal Appearance] : normal appearance [Well Groomed] : well groomed [General Appearance - Well Nourished] : well nourished [No Deformities] : no deformities [General Appearance - In No Acute Distress] : no acute distress [Eyelids - No Xanthelasma] : the eyelids demonstrated no xanthelasmas [Normal Conjunctiva] : the conjunctiva exhibited no abnormalities [Normal Oral Mucosa] : normal oral mucosa [No Oral Pallor] : no oral pallor [No Oral Cyanosis] : no oral cyanosis [Normal Jugular Venous A Waves Present] : normal jugular venous A waves present [No Jugular Venous Larsen A Waves] : no jugular venous larsen A waves [Normal Jugular Venous V Waves Present] : normal jugular venous V waves present [Respiration, Rhythm And Depth] : normal respiratory rhythm and effort [Exaggerated Use Of Accessory Muscles For Inspiration] : no accessory muscle use [Auscultation Breath Sounds / Voice Sounds] : lungs were clear to auscultation bilaterally [Heart Rate And Rhythm] : heart rate and rhythm were normal [Heart Sounds] : normal S1 and S2 [Murmurs] : no murmurs present [Abdomen Soft] : soft [Abdomen Tenderness] : non-tender [Abdomen Mass (___ Cm)] : no abdominal mass palpated [Nail Clubbing] : no clubbing of the fingernails [Gait - Sufficient For Exercise Testing] : the gait was sufficient for exercise testing [Abnormal Walk] : normal gait [Cyanosis, Localized] : no localized cyanosis [Petechial Hemorrhages (___cm)] : no petechial hemorrhages [] : no rash [Skin Color & Pigmentation] : normal skin color and pigmentation [No Venous Stasis] : no venous stasis [No Skin Ulcers] : no skin ulcer [Skin Lesions] : no skin lesions [No Xanthoma] : no  xanthoma was observed

## 2019-12-23 NOTE — DISCUSSION/SUMMARY
[FreeTextEntry1] : 54 year old woman with history of CAD, AWMI in March 2019,  HLD here for followup\par \par 1. CAD- AWMI with PCI dLAD; Nuclear stress test in October 2019 that showed fixed apical and distal inferior defects/ EKG reviewed and unchanged. On ASA and Plavix. Condition is stable. Continue present meds\par 2. HTN- On Metoprolol, continue present meds\par 3. HLD- On statin therapy, continue present meds\par 4. FU in 4 months

## 2020-06-22 ENCOUNTER — NON-APPOINTMENT (OUTPATIENT)
Age: 55
End: 2020-06-22

## 2020-06-22 ENCOUNTER — APPOINTMENT (OUTPATIENT)
Dept: CARDIOLOGY | Facility: CLINIC | Age: 55
End: 2020-06-22
Payer: COMMERCIAL

## 2020-06-22 VITALS
HEART RATE: 82 BPM | OXYGEN SATURATION: 96 % | BODY MASS INDEX: 32.96 KG/M2 | DIASTOLIC BLOOD PRESSURE: 85 MMHG | TEMPERATURE: 97.6 F | WEIGHT: 210 LBS | RESPIRATION RATE: 16 BRPM | HEIGHT: 67 IN | SYSTOLIC BLOOD PRESSURE: 125 MMHG

## 2020-06-22 PROCEDURE — 93000 ELECTROCARDIOGRAM COMPLETE: CPT

## 2020-06-22 PROCEDURE — 99214 OFFICE O/P EST MOD 30 MIN: CPT

## 2020-06-22 RX ORDER — CLOPIDOGREL BISULFATE 75 MG/1
75 TABLET, FILM COATED ORAL
Qty: 90 | Refills: 3 | Status: DISCONTINUED | COMMUNITY
Start: 2019-04-01 | End: 2020-06-22

## 2020-06-22 NOTE — PHYSICAL EXAM
[Normal Appearance] : normal appearance [General Appearance - Well Developed] : well developed [General Appearance - Well Nourished] : well nourished [Well Groomed] : well groomed [No Deformities] : no deformities [Normal Conjunctiva] : the conjunctiva exhibited no abnormalities [General Appearance - In No Acute Distress] : no acute distress [Eyelids - No Xanthelasma] : the eyelids demonstrated no xanthelasmas [Normal Oral Mucosa] : normal oral mucosa [No Oral Cyanosis] : no oral cyanosis [No Oral Pallor] : no oral pallor [Normal Jugular Venous V Waves Present] : normal jugular venous V waves present [Normal Jugular Venous A Waves Present] : normal jugular venous A waves present [No Jugular Venous Larsen A Waves] : no jugular venous larsen A waves [Respiration, Rhythm And Depth] : normal respiratory rhythm and effort [Auscultation Breath Sounds / Voice Sounds] : lungs were clear to auscultation bilaterally [Exaggerated Use Of Accessory Muscles For Inspiration] : no accessory muscle use [Heart Sounds] : normal S1 and S2 [Heart Rate And Rhythm] : heart rate and rhythm were normal [Abdomen Soft] : soft [Murmurs] : no murmurs present [Abdomen Tenderness] : non-tender [Abdomen Mass (___ Cm)] : no abdominal mass palpated [Abnormal Walk] : normal gait [Cyanosis, Localized] : no localized cyanosis [Nail Clubbing] : no clubbing of the fingernails [Gait - Sufficient For Exercise Testing] : the gait was sufficient for exercise testing [Petechial Hemorrhages (___cm)] : no petechial hemorrhages [Skin Color & Pigmentation] : normal skin color and pigmentation [Skin Lesions] : no skin lesions [] : no rash [No Venous Stasis] : no venous stasis [No Xanthoma] : no  xanthoma was observed [No Skin Ulcers] : no skin ulcer

## 2020-06-22 NOTE — DISCUSSION/SUMMARY
[FreeTextEntry1] : 54 year old woman with CAD HTN HLD here for followup\par \par 1. CAD- AWMI with PCI to dLAD, Nuclear stress test Oct with fixed apical and distal inferior defects\par On ASA and Plavix.Continue present meds- but can dc plavix since over 1 year post mi\par 2. HTN- On Metoprolol, continue present meds\par 3. HLD- On Statin therapy continue present meds\par 4. Edema- will trial low dose lasix 20 mg daily\par 5. FU in 6 months

## 2020-06-22 NOTE — HISTORY OF PRESENT ILLNESS
[FreeTextEntry1] : Here for followup. No complaints. No chest pain, dyspnea. COmplaining of some mild edema.\par 1. CAD- AWMI with PCI to dLAD, Nuclear stress test Oct with fixed apical and distal inferior defects\par On ASA and Plavix.Continue present meds but can dc plavix since over 1 year post mi\par 2. HTN- On Metoprolol, continue present meds\par 3. HLD- On Statin therapy continue present meds

## 2020-07-17 ENCOUNTER — OUTPATIENT (OUTPATIENT)
Dept: OUTPATIENT SERVICES | Facility: HOSPITAL | Age: 55
LOS: 1 days | End: 2020-07-17

## 2020-07-17 ENCOUNTER — APPOINTMENT (OUTPATIENT)
Dept: CV DIAGNOSITCS | Facility: HOSPITAL | Age: 55
End: 2020-07-17
Payer: COMMERCIAL

## 2020-07-17 DIAGNOSIS — Z95.5 PRESENCE OF CORONARY ANGIOPLASTY IMPLANT AND GRAFT: Chronic | ICD-10-CM

## 2020-07-17 DIAGNOSIS — Z98.890 OTHER SPECIFIED POSTPROCEDURAL STATES: Chronic | ICD-10-CM

## 2020-07-17 DIAGNOSIS — Z90.721 ACQUIRED ABSENCE OF OVARIES, UNILATERAL: Chronic | ICD-10-CM

## 2020-07-17 DIAGNOSIS — I25.10 ATHEROSCLEROTIC HEART DISEASE OF NATIVE CORONARY ARTERY WITHOUT ANGINA PECTORIS: ICD-10-CM

## 2020-07-17 DIAGNOSIS — Z90.710 ACQUIRED ABSENCE OF BOTH CERVIX AND UTERUS: Chronic | ICD-10-CM

## 2020-07-17 PROCEDURE — 93306 TTE W/DOPPLER COMPLETE: CPT | Mod: 26

## 2020-08-27 NOTE — ED CDU PROVIDER DISPOSITION NOTE - NS_CDULENGTHOFSTAY_ED_A_ED
----- Message from Keily Hahn NP sent at 8/27/2020  7:34 AM CDT -----  We are still waiting for her Pap smear.  Please let her know that this is negative.  Thanks, SHAYAN    
Writer called to inform patient of normal STI culture. LM on VM informing patient of results. and Number left on VM for return call with questions or concerns..      
24 Hour(s) 41 Minute(s)

## 2020-09-21 ENCOUNTER — APPOINTMENT (OUTPATIENT)
Dept: CARDIOLOGY | Facility: CLINIC | Age: 55
End: 2020-09-21

## 2020-10-16 NOTE — ED ADULT NURSE NOTE - OBJECTIVE STATEMENT
Referral ordered.    Isabelle Rodas MD    Pt presenting to room 20 AxOx4. ambulatory at baseline with c/o hematuria x2 days. PMH of asthma. Pt presenting to ED in no apparent acute distress, respirations even and unlabored, satting well on RA. Pt states she had an MI last Thursday, came to [facility] ED for chest tightness, was discharged on Lovenox. No other significant cardiac history noted. Pt has visible bruising from Lovenox injections on right upper arm and lower abdominal quadrants, and c/o mild discomfort in bruised areas and in lower abdominal quadrants. Pt denies pain or burning with urination, change in bowel habits, N/V, dizziness, lightheadedness, H/A, chills, fever, CP, SOB. Skin dry and intact, motor strength equal in all extremities. IV established in right forerm with 22g, labs drawn and sent. Vitals noted and stable, MD at bedside, patient on cardiac monitor- NSR @94, will continue to monitor.

## 2020-11-23 ENCOUNTER — APPOINTMENT (OUTPATIENT)
Dept: CARDIOLOGY | Facility: CLINIC | Age: 55
End: 2020-11-23

## 2020-12-01 ENCOUNTER — APPOINTMENT (OUTPATIENT)
Dept: CARDIOLOGY | Facility: CLINIC | Age: 55
End: 2020-12-01
Payer: COMMERCIAL

## 2020-12-01 VITALS
OXYGEN SATURATION: 96 % | SYSTOLIC BLOOD PRESSURE: 104 MMHG | BODY MASS INDEX: 38.53 KG/M2 | HEIGHT: 67 IN | HEART RATE: 82 BPM | TEMPERATURE: 97.1 F | DIASTOLIC BLOOD PRESSURE: 71 MMHG

## 2020-12-01 VITALS — BODY MASS INDEX: 38.53 KG/M2 | WEIGHT: 246 LBS

## 2020-12-01 PROCEDURE — 99072 ADDL SUPL MATRL&STAF TM PHE: CPT

## 2020-12-01 PROCEDURE — 93000 ELECTROCARDIOGRAM COMPLETE: CPT

## 2020-12-01 PROCEDURE — 99215 OFFICE O/P EST HI 40 MIN: CPT

## 2020-12-01 RX ORDER — ZOLPIDEM TARTRATE 12.5 MG/1
12.5 TABLET, EXTENDED RELEASE ORAL
Qty: 30 | Refills: 0 | Status: DISCONTINUED | COMMUNITY
Start: 2018-11-10 | End: 2020-12-01

## 2020-12-01 RX ORDER — ESZOPICLONE 3 MG/1
3 TABLET, COATED ORAL
Qty: 90 | Refills: 0 | Status: ACTIVE | COMMUNITY
Start: 2020-12-01

## 2020-12-01 NOTE — PHYSICAL EXAM
[General Appearance - Well Developed] : well developed [Normal Appearance] : normal appearance [Well Groomed] : well groomed [General Appearance - Well Nourished] : well nourished [No Deformities] : no deformities [General Appearance - In No Acute Distress] : no acute distress [Normal Conjunctiva] : the conjunctiva exhibited no abnormalities [Eyelids - No Xanthelasma] : the eyelids demonstrated no xanthelasmas [Normal Oral Mucosa] : normal oral mucosa [No Oral Pallor] : no oral pallor [No Oral Cyanosis] : no oral cyanosis [Normal Jugular Venous A Waves Present] : normal jugular venous A waves present [Normal Jugular Venous V Waves Present] : normal jugular venous V waves present [No Jugular Venous Larsen A Waves] : no jugular venous larsen A waves [Respiration, Rhythm And Depth] : normal respiratory rhythm and effort [Exaggerated Use Of Accessory Muscles For Inspiration] : no accessory muscle use [Auscultation Breath Sounds / Voice Sounds] : lungs were clear to auscultation bilaterally [Heart Rate And Rhythm] : heart rate and rhythm were normal [Heart Sounds] : normal S1 and S2 [Murmurs] : no murmurs present [Abdomen Soft] : soft [Abdomen Tenderness] : non-tender [Abdomen Mass (___ Cm)] : no abdominal mass palpated [Abnormal Walk] : normal gait [Gait - Sufficient For Exercise Testing] : the gait was sufficient for exercise testing [Nail Clubbing] : no clubbing of the fingernails [Cyanosis, Localized] : no localized cyanosis [Petechial Hemorrhages (___cm)] : no petechial hemorrhages [Skin Color & Pigmentation] : normal skin color and pigmentation [] : no rash [No Venous Stasis] : no venous stasis [Skin Lesions] : no skin lesions [No Skin Ulcers] : no skin ulcer [No Xanthoma] : no  xanthoma was observed [Normal] : normal [Normal Rate] : normal [Rhythm Regular] : regular [Normal S1] : normal S1 [Normal S2] : normal S2

## 2021-02-04 ENCOUNTER — OUTPATIENT (OUTPATIENT)
Dept: OUTPATIENT SERVICES | Facility: HOSPITAL | Age: 56
LOS: 1 days | End: 2021-02-04
Payer: COMMERCIAL

## 2021-02-04 ENCOUNTER — RESULT REVIEW (OUTPATIENT)
Age: 56
End: 2021-02-04

## 2021-02-04 VITALS
TEMPERATURE: 98 F | HEART RATE: 71 BPM | RESPIRATION RATE: 18 BRPM | OXYGEN SATURATION: 98 % | SYSTOLIC BLOOD PRESSURE: 123 MMHG | HEIGHT: 68 IN | DIASTOLIC BLOOD PRESSURE: 81 MMHG | WEIGHT: 242.07 LBS

## 2021-02-04 DIAGNOSIS — Z01.818 ENCOUNTER FOR OTHER PREPROCEDURAL EXAMINATION: ICD-10-CM

## 2021-02-04 DIAGNOSIS — M17.11 UNILATERAL PRIMARY OSTEOARTHRITIS, RIGHT KNEE: ICD-10-CM

## 2021-02-04 DIAGNOSIS — I25.10 ATHEROSCLEROTIC HEART DISEASE OF NATIVE CORONARY ARTERY WITHOUT ANGINA PECTORIS: ICD-10-CM

## 2021-02-04 DIAGNOSIS — M79.7 FIBROMYALGIA: ICD-10-CM

## 2021-02-04 DIAGNOSIS — G47.00 INSOMNIA, UNSPECIFIED: ICD-10-CM

## 2021-02-04 DIAGNOSIS — Z95.5 PRESENCE OF CORONARY ANGIOPLASTY IMPLANT AND GRAFT: Chronic | ICD-10-CM

## 2021-02-04 DIAGNOSIS — Z90.721 ACQUIRED ABSENCE OF OVARIES, UNILATERAL: Chronic | ICD-10-CM

## 2021-02-04 DIAGNOSIS — J45.909 UNSPECIFIED ASTHMA, UNCOMPLICATED: ICD-10-CM

## 2021-02-04 DIAGNOSIS — I50.9 HEART FAILURE, UNSPECIFIED: ICD-10-CM

## 2021-02-04 DIAGNOSIS — Z98.890 OTHER SPECIFIED POSTPROCEDURAL STATES: Chronic | ICD-10-CM

## 2021-02-04 DIAGNOSIS — E78.5 HYPERLIPIDEMIA, UNSPECIFIED: ICD-10-CM

## 2021-02-04 DIAGNOSIS — I10 ESSENTIAL (PRIMARY) HYPERTENSION: ICD-10-CM

## 2021-02-04 DIAGNOSIS — Z90.710 ACQUIRED ABSENCE OF BOTH CERVIX AND UTERUS: Chronic | ICD-10-CM

## 2021-02-04 LAB
ALBUMIN SERPL ELPH-MCNC: 3.6 G/DL — SIGNIFICANT CHANGE UP (ref 3.5–5)
ALP SERPL-CCNC: 165 U/L — HIGH (ref 40–120)
ALT FLD-CCNC: 28 U/L DA — SIGNIFICANT CHANGE UP (ref 10–60)
ANION GAP SERPL CALC-SCNC: 5 MMOL/L — SIGNIFICANT CHANGE UP (ref 5–17)
APPEARANCE UR: CLEAR — SIGNIFICANT CHANGE UP
APTT BLD: 31.6 SEC — SIGNIFICANT CHANGE UP (ref 27.5–35.5)
AST SERPL-CCNC: 24 U/L — SIGNIFICANT CHANGE UP (ref 10–40)
BACTERIA # UR AUTO: ABNORMAL /HPF
BILIRUB SERPL-MCNC: 0.6 MG/DL — SIGNIFICANT CHANGE UP (ref 0.2–1.2)
BILIRUB UR-MCNC: NEGATIVE — SIGNIFICANT CHANGE UP
BLD GP AB SCN SERPL QL: SIGNIFICANT CHANGE UP
BUN SERPL-MCNC: 11 MG/DL — SIGNIFICANT CHANGE UP (ref 7–18)
CALCIUM SERPL-MCNC: 8.8 MG/DL — SIGNIFICANT CHANGE UP (ref 8.4–10.5)
CHLORIDE SERPL-SCNC: 105 MMOL/L — SIGNIFICANT CHANGE UP (ref 96–108)
CO2 SERPL-SCNC: 32 MMOL/L — HIGH (ref 22–31)
COLOR SPEC: YELLOW — SIGNIFICANT CHANGE UP
CREAT SERPL-MCNC: 0.74 MG/DL — SIGNIFICANT CHANGE UP (ref 0.5–1.3)
DIFF PNL FLD: ABNORMAL
EPI CELLS # UR: ABNORMAL /HPF
GLUCOSE SERPL-MCNC: 92 MG/DL — SIGNIFICANT CHANGE UP (ref 70–99)
GLUCOSE UR QL: NEGATIVE — SIGNIFICANT CHANGE UP
HCT VFR BLD CALC: 41.1 % — SIGNIFICANT CHANGE UP (ref 34.5–45)
HGB BLD-MCNC: 12.6 G/DL — SIGNIFICANT CHANGE UP (ref 11.5–15.5)
INR BLD: 1.04 RATIO — SIGNIFICANT CHANGE UP (ref 0.88–1.16)
KETONES UR-MCNC: ABNORMAL
LEUKOCYTE ESTERASE UR-ACNC: ABNORMAL
MCHC RBC-ENTMCNC: 25.6 PG — LOW (ref 27–34)
MCHC RBC-ENTMCNC: 30.7 GM/DL — LOW (ref 32–36)
MCV RBC AUTO: 83.4 FL — SIGNIFICANT CHANGE UP (ref 80–100)
NITRITE UR-MCNC: NEGATIVE — SIGNIFICANT CHANGE UP
NRBC # BLD: 0 /100 WBCS — SIGNIFICANT CHANGE UP (ref 0–0)
PH UR: 6.5 — SIGNIFICANT CHANGE UP (ref 5–8)
PLATELET # BLD AUTO: 206 K/UL — SIGNIFICANT CHANGE UP (ref 150–400)
POTASSIUM SERPL-MCNC: 3.6 MMOL/L — SIGNIFICANT CHANGE UP (ref 3.5–5.3)
POTASSIUM SERPL-SCNC: 3.6 MMOL/L — SIGNIFICANT CHANGE UP (ref 3.5–5.3)
PROT SERPL-MCNC: 7.6 G/DL — SIGNIFICANT CHANGE UP (ref 6–8.3)
PROT UR-MCNC: NEGATIVE — SIGNIFICANT CHANGE UP
PROTHROM AB SERPL-ACNC: 12.3 SEC — SIGNIFICANT CHANGE UP (ref 10.6–13.6)
RBC # BLD: 4.93 M/UL — SIGNIFICANT CHANGE UP (ref 3.8–5.2)
RBC # FLD: 15.3 % — HIGH (ref 10.3–14.5)
RBC CASTS # UR COMP ASSIST: ABNORMAL /HPF (ref 0–2)
SODIUM SERPL-SCNC: 142 MMOL/L — SIGNIFICANT CHANGE UP (ref 135–145)
SP GR SPEC: 1.01 — SIGNIFICANT CHANGE UP (ref 1.01–1.02)
UROBILINOGEN FLD QL: NEGATIVE — SIGNIFICANT CHANGE UP
WBC # BLD: 6.35 K/UL — SIGNIFICANT CHANGE UP (ref 3.8–10.5)
WBC # FLD AUTO: 6.35 K/UL — SIGNIFICANT CHANGE UP (ref 3.8–10.5)
WBC UR QL: SIGNIFICANT CHANGE UP /HPF (ref 0–5)

## 2021-02-04 PROCEDURE — 71046 X-RAY EXAM CHEST 2 VIEWS: CPT | Mod: 26

## 2021-02-04 PROCEDURE — G0463: CPT

## 2021-02-04 PROCEDURE — 71046 X-RAY EXAM CHEST 2 VIEWS: CPT

## 2021-02-04 RX ORDER — CYCLOBENZAPRINE HYDROCHLORIDE 10 MG/1
1 TABLET, FILM COATED ORAL
Qty: 0 | Refills: 0 | DISCHARGE

## 2021-02-04 RX ORDER — ZOLPIDEM TARTRATE 10 MG/1
1 TABLET ORAL
Qty: 0 | Refills: 0 | DISCHARGE

## 2021-02-04 NOTE — H&P PST ADULT - NSICDXPASTMEDICALHX_GEN_ALL_CORE_FT
PAST MEDICAL HISTORY:  Asthma     CAD (coronary artery disease) S/P stent placement    CHF (congestive heart failure)     Fibromyalgia     HLD (hyperlipidemia)     HTN (hypertension)     Primary osteoarthritis of right knee     STEMI (ST elevation myocardial infarction)      PAST MEDICAL HISTORY:  Asthma     CAD (coronary artery disease) S/P stent placement    CHF (congestive heart failure)     Fibromyalgia     HLD (hyperlipidemia)     HTN (hypertension)     Lumbar herniated disc     Primary osteoarthritis of right knee     Seasonal allergies     STEMI (ST elevation myocardial infarction)

## 2021-02-04 NOTE — H&P PST ADULT - DOES PATIENT HAVE ADVANCE DIRECTIVE
Pt's mother Joy Day [phone] ; Patrice Shay [phone]; Citlali Martinez [phone]; sister Diana Loaiza [phone] will make decisions in case of emergency/No

## 2021-02-04 NOTE — H&P PST ADULT - NEGATIVE GENERAL GENITOURINARY SYMPTOMS
no hematuria/no urine discoloration/no bladder infections/no incontinence/no dysuria/normal urinary frequency

## 2021-02-04 NOTE — H&P PST ADULT - NSICDXPROBLEM_GEN_ALL_CORE_FT
PROBLEM DIAGNOSES  Problem: Primary osteoarthritis of right knee  Assessment and Plan: Right total knee replacement on 02/08/2021. Preoperative instructions discussed with pt and given to pt. Instructed pt that no one will be allowed to come to hospital with her, to notify security when she arrives in the lobby of the hospital that she is here for surgery, not to eat or drink anything after midnight the night before the surgery, to avoid NSAIDS such as Ibuprofen, motrin, aleve, advil, naproxen before surgery, to take Tylenol if needed for pain, to report if she has been exposed to any one with any contagious diseases including Covid-19 or if she is exhibiting any symptoms of COVID-19, to keep appointment for COVID-19  test 3 days before surgery. Instructed about use of Chlorhexidine 4% soap before surgery. Verbalized understanding of instructions given.     Problem: HLD (hyperlipidemia)  Assessment and Plan: Instructed pt to continue Atorvastatin and to follow-up with PCP for lipid management     Problem: HTN (hypertension)  Assessment and Plan: Instructed to continue Metoprolol and take with sips of water on day of surgery. Pt seen by PCP for clearance. Follow-up with PCP for management.     Problem: Coronary artery disease  Assessment and Plan: h/o coronary angioplasty ( 1 stent ). Pt instructed not to stop aspirin, to continue taking aspirin and to take aspirin on the day of surgery. Seen by cardiologist on December 1st, but for clearance. Will attempt to get updated note before surgery. Pt to follow-up with cardiologist for management.     Problem: Asthma  Assessment and Plan: Instructed to continue use of inhalers and use same on day of surgery. Instructed to bring inhalers to hospital on day of surgery.Follow-up with PCP postop for management      Problem: Insomnia  Assessment and Plan: Instructed to continue Lunesta and to follow-up with provider for management.     Problem: Fibromyalgia  Assessment and Plan: Pt instructed to avoid NSAIDs 1 week before surgery.  Advised to take Tylenol as needed for pain. Stated understanding of instructions given.        PROBLEM DIAGNOSES  Problem: CHF (congestive heart failure)  Assessment and Plan: Echo 7/17/2020 EF 36%. Last seen by cardiology on 12/1/2020. Will attempt to get updated note before surgery.    Problem: Primary osteoarthritis of right knee  Assessment and Plan: Right total knee replacement on 02/08/2021. Preoperative instructions discussed with pt and given to pt. Instructed pt that no one will be allowed to come to hospital with her, to notify security when she arrives in the lobby of the hospital that she is here for surgery, not to eat or drink anything after midnight the night before the surgery, to avoid NSAIDS such as Ibuprofen, motrin, aleve, advil, naproxen before surgery, to take Tylenol if needed for pain, to report if she has been exposed to any one with any contagious diseases including Covid-19 or if she is exhibiting any symptoms of COVID-19, to keep appointment for COVID-19  test 3 days before surgery. Instructed about use of Chlorhexidine 4% soap before surgery. Verbalized understanding of instructions given.     Problem: HLD (hyperlipidemia)  Assessment and Plan: Instructed pt to continue Atorvastatin and to follow-up with PCP for lipid management     Problem: HTN (hypertension)  Assessment and Plan: Instructed to continue Metoprolol and take with sips of water on day of surgery. Pt seen by PCP for clearance. Follow-up with PCP for management.     Problem: Coronary artery disease  Assessment and Plan: h/o coronary angioplasty ( 1 stent ). Pt instructed not to stop aspirin, to continue taking aspirin and to take aspirin on the day of surgery. Seen by cardiologist on December 1st, but for clearance. Will attempt to get updated note before surgery. Pt to follow-up with cardiologist for management.     Problem: Asthma  Assessment and Plan: Instructed to continue use of inhalers and use same on day of surgery. Instructed to bring inhalers to hospital on day of surgery.Follow-up with PCP postop for management      Problem: Insomnia  Assessment and Plan: Instructed to continue Lunesta and to follow-up with provider for management.     Problem: Fibromyalgia  Assessment and Plan: Pt instructed to avoid NSAIDs 1 week before surgery.  Advised to take Tylenol as needed for pain. Stated understanding of instructions given.

## 2021-02-04 NOTE — H&P PST ADULT - MUSCULOSKELETAL COMMENTS
c/o bilateral knee pain due to OA-right knee worse than left knee, c/o pain in both feet-work-up negative

## 2021-02-04 NOTE — H&P PST ADULT - NEGATIVE NEUROLOGICAL SYMPTOMS
no weakness/no paresthesias/no generalized seizures/no focal seizures/no syncope/no tremors/no vertigo

## 2021-02-04 NOTE — H&P PST ADULT - REASON FOR ADMISSION
right knee pain right knee pain  Pt's goal is to be pain free, walk better, ride bike again after surgery.

## 2021-02-04 NOTE — H&P PST ADULT - NSICDXFAMILYHX_GEN_ALL_CORE_FT
FAMILY HISTORY:  Family history of brain cancer, brother  Family history of COPD (chronic obstructive pulmonary disease), mother  FH: coronary artery disease, mother

## 2021-02-04 NOTE — H&P PST ADULT - RESPIRATORY AND THORAX COMMENTS
asthma-last attack in 2019-hospitalized many times, last one in 2017, never intubated; seasonal allergies

## 2021-02-04 NOTE — H&P PST ADULT - ASSESSMENT
55 yr old female with PMH of mild persistent asthma, CAD, STEMI with PCI to dLAD on 3/2019, HTN, HLD, insomnia presents with right knee osteoarthritis. Pt is scheduled for right total knee replacement on 02/08/2021/   55 yr old female with PMH of mild persistent asthma, CAD, STEMI with PCI to dLAD on 3/2019, HTN, HLD, CHF, insomnia, fibromyalgia, seasonal allergies presents with right knee osteoarthritis. Pt is scheduled for right total knee replacement on 02/08/2021.

## 2021-02-04 NOTE — H&P PST ADULT - HISTORY OF PRESENT ILLNESS
55 yr old female with PMH of CAD, HTN, HLD, presents with c/o knee pain due to osteoarthritis.Pt reports worsening of pain with prolonged ambulation and standing.Recent X-Ray revealed loss of cartilage. Pt for total knee replacement  55 yr old female with PMH of mild persistent asthma, CAD, STEMI with PCI to dLAD on 3/2019, HTN, HLD, insomnia, fibromyalgia presents with c/o right knee pain due to osteoarthritis. Pt reports worsening of pain with prolonged ambulation and standing. Recent X-Ray revealed loss of cartilage. Pt for right total knee replacement on 02/08/2021/ 55 yr old female with PMH of mild persistent asthma, CAD, STEMI with PCI to dLAD on 3/2019, HTN, HLD, insomnia, fibromyalgia, lumbar herniated discs presents with c/o right knee pain due to osteoarthritis. Pt reports worsening of pain with prolonged ambulation and standing. Recent X-Ray revealed loss of cartilage. Pt for right total knee replacement on 02/08/2021/

## 2021-02-04 NOTE — H&P PST ADULT - NSICDXPASTSURGICALHX_GEN_ALL_CORE_FT
PAST SURGICAL HISTORY:  S/P coronary artery stent placement LAD on 3/2019    S/P D&C (status post dilation and curettage)     S/P hysterectomy     S/P left knee arthroscopy     S/P nasal septoplasty     S/P right oophorectomy

## 2021-02-05 ENCOUNTER — APPOINTMENT (OUTPATIENT)
Dept: DISASTER EMERGENCY | Facility: CLINIC | Age: 56
End: 2021-02-05

## 2021-02-05 ENCOUNTER — NON-APPOINTMENT (OUTPATIENT)
Age: 56
End: 2021-02-05

## 2021-02-05 LAB
A1C WITH ESTIMATED AVERAGE GLUCOSE RESULT: 5.8 % — HIGH (ref 4–5.6)
ESTIMATED AVERAGE GLUCOSE: 120 MG/DL — HIGH (ref 68–114)
MRSA PCR RESULT.: SIGNIFICANT CHANGE UP
S AUREUS DNA NOSE QL NAA+PROBE: SIGNIFICANT CHANGE UP

## 2021-02-05 NOTE — ADDENDUM
[FreeTextEntry1] : Patient seen in office 12/1/2020. EKG at that time reviewed and normal. No new symptoms. She is maintained on ASA due to AWMI in March 2019. She has known moderate systolic LV dysfunction from the MI. Last TTE July 2020- 1. Normal mitral valve. Minimal mitral regurgitation. 2. Normal left ventricular internal dimensions and wall thicknesses. 3. Moderate segmental left ventricular systolic dysfunction.  Hypokinesis of the apex, distal septum, and distal anterior wall.  Endocardial visualization enhanced with intravenous injection of echo contrast (Definity).  No LV thrombus seen. 4. The right ventricle is not well visualized; grossly normal right ventricular systolic function.\par \par She has no symptoms of congestive heart failure and no chest pain or dypsnea. She is medically optimized for knee replacement\par Would recommend careful management of fluid replacement to avoid overload.

## 2021-02-05 NOTE — DISCUSSION/SUMMARY
[FreeTextEntry1] : 54 year old woman with CAD HTN HLD here for followup\par \par 1. CAD- AWMI with PCI to dLAD (march 2019) Nuclear stress test Oct 2019 with fixed apical and distal inferior defects\par On ASA .Continue present meds\par 2. HTN- On Metoprolol, continue present meds\par 3. HLD- On Statin therapy continue present meds\par 4. Edema- continue lasix as needed\par 5. FU in 6 months\par \par Follow up in 3 M

## 2021-02-05 NOTE — HISTORY OF PRESENT ILLNESS
[FreeTextEntry1] : Here for followup. No complaints.\par 1. CAD- AWMI with PCI to dLAD (march 2019) Nuclear stress test Oct 2019 with fixed apical and distal inferior defects\par On ASA .Continue present meds\par 2. HTN- On Metoprolol, continue present meds\par 3. HLD- On Statin therapy continue present meds\par \par Doing well. Denies any CP, SOB, dizziness, LH, near syncope or syncope.

## 2021-02-06 LAB — SARS-COV-2 N GENE NPH QL NAA+PROBE: NOT DETECTED

## 2021-02-07 ENCOUNTER — TRANSCRIPTION ENCOUNTER (OUTPATIENT)
Age: 56
End: 2021-02-07

## 2021-02-08 ENCOUNTER — TRANSCRIPTION ENCOUNTER (OUTPATIENT)
Age: 56
End: 2021-02-08

## 2021-02-08 ENCOUNTER — RESULT REVIEW (OUTPATIENT)
Age: 56
End: 2021-02-08

## 2021-02-08 ENCOUNTER — INPATIENT (INPATIENT)
Facility: HOSPITAL | Age: 56
LOS: 2 days | Discharge: HOME HEALTH | DRG: 470 | End: 2021-02-11
Attending: ORTHOPAEDIC SURGERY | Admitting: ORTHOPAEDIC SURGERY
Payer: COMMERCIAL

## 2021-02-08 VITALS
HEART RATE: 114 BPM | WEIGHT: 242.07 LBS | DIASTOLIC BLOOD PRESSURE: 78 MMHG | RESPIRATION RATE: 18 BRPM | HEIGHT: 68 IN | SYSTOLIC BLOOD PRESSURE: 103 MMHG | TEMPERATURE: 98 F | OXYGEN SATURATION: 97 %

## 2021-02-08 DIAGNOSIS — Z90.710 ACQUIRED ABSENCE OF BOTH CERVIX AND UTERUS: Chronic | ICD-10-CM

## 2021-02-08 DIAGNOSIS — Z96.651 PRESENCE OF RIGHT ARTIFICIAL KNEE JOINT: ICD-10-CM

## 2021-02-08 DIAGNOSIS — Z98.890 OTHER SPECIFIED POSTPROCEDURAL STATES: Chronic | ICD-10-CM

## 2021-02-08 DIAGNOSIS — M79.7 FIBROMYALGIA: ICD-10-CM

## 2021-02-08 DIAGNOSIS — M25.561 PAIN IN RIGHT KNEE: ICD-10-CM

## 2021-02-08 DIAGNOSIS — I50.9 HEART FAILURE, UNSPECIFIED: ICD-10-CM

## 2021-02-08 DIAGNOSIS — M17.11 UNILATERAL PRIMARY OSTEOARTHRITIS, RIGHT KNEE: ICD-10-CM

## 2021-02-08 DIAGNOSIS — I95.9 HYPOTENSION, UNSPECIFIED: ICD-10-CM

## 2021-02-08 DIAGNOSIS — Z95.5 PRESENCE OF CORONARY ANGIOPLASTY IMPLANT AND GRAFT: Chronic | ICD-10-CM

## 2021-02-08 DIAGNOSIS — J45.909 UNSPECIFIED ASTHMA, UNCOMPLICATED: ICD-10-CM

## 2021-02-08 DIAGNOSIS — Z90.721 ACQUIRED ABSENCE OF OVARIES, UNILATERAL: Chronic | ICD-10-CM

## 2021-02-08 LAB
ANION GAP SERPL CALC-SCNC: 4 MMOL/L — LOW (ref 5–17)
BLD GP AB SCN SERPL QL: SIGNIFICANT CHANGE UP
BUN SERPL-MCNC: 16 MG/DL — SIGNIFICANT CHANGE UP (ref 7–18)
CALCIUM SERPL-MCNC: 8.6 MG/DL — SIGNIFICANT CHANGE UP (ref 8.4–10.5)
CHLORIDE SERPL-SCNC: 107 MMOL/L — SIGNIFICANT CHANGE UP (ref 96–108)
CK MB BLD-MCNC: 0.7 % — SIGNIFICANT CHANGE UP (ref 0–3.5)
CK MB CFR SERPL CALC: 2 NG/ML — SIGNIFICANT CHANGE UP (ref 0–3.6)
CK SERPL-CCNC: 272 U/L — HIGH (ref 21–215)
CO2 SERPL-SCNC: 32 MMOL/L — HIGH (ref 22–31)
CREAT SERPL-MCNC: 1.06 MG/DL — SIGNIFICANT CHANGE UP (ref 0.5–1.3)
GLUCOSE BLDC GLUCOMTR-MCNC: 108 MG/DL — HIGH (ref 70–99)
GLUCOSE BLDC GLUCOMTR-MCNC: 111 MG/DL — HIGH (ref 70–99)
GLUCOSE SERPL-MCNC: 113 MG/DL — HIGH (ref 70–99)
HCT VFR BLD CALC: 38.3 % — SIGNIFICANT CHANGE UP (ref 34.5–45)
HGB BLD-MCNC: 11.6 G/DL — SIGNIFICANT CHANGE UP (ref 11.5–15.5)
MCHC RBC-ENTMCNC: 25.7 PG — LOW (ref 27–34)
MCHC RBC-ENTMCNC: 30.3 GM/DL — LOW (ref 32–36)
MCV RBC AUTO: 84.7 FL — SIGNIFICANT CHANGE UP (ref 80–100)
NRBC # BLD: 0 /100 WBCS — SIGNIFICANT CHANGE UP (ref 0–0)
PLATELET # BLD AUTO: 197 K/UL — SIGNIFICANT CHANGE UP (ref 150–400)
POTASSIUM SERPL-MCNC: 3.8 MMOL/L — SIGNIFICANT CHANGE UP (ref 3.5–5.3)
POTASSIUM SERPL-SCNC: 3.8 MMOL/L — SIGNIFICANT CHANGE UP (ref 3.5–5.3)
RBC # BLD: 4.52 M/UL — SIGNIFICANT CHANGE UP (ref 3.8–5.2)
RBC # FLD: 15.1 % — HIGH (ref 10.3–14.5)
SODIUM SERPL-SCNC: 143 MMOL/L — SIGNIFICANT CHANGE UP (ref 135–145)
TROPONIN I SERPL-MCNC: <0.015 NG/ML — SIGNIFICANT CHANGE UP (ref 0–0.04)
WBC # BLD: 7.63 K/UL — SIGNIFICANT CHANGE UP (ref 3.8–10.5)
WBC # FLD AUTO: 7.63 K/UL — SIGNIFICANT CHANGE UP (ref 3.8–10.5)

## 2021-02-08 PROCEDURE — 99222 1ST HOSP IP/OBS MODERATE 55: CPT

## 2021-02-08 PROCEDURE — 99253 IP/OBS CNSLTJ NEW/EST LOW 45: CPT | Mod: GC

## 2021-02-08 PROCEDURE — 88305 TISSUE EXAM BY PATHOLOGIST: CPT | Mod: 26

## 2021-02-08 PROCEDURE — 88311 DECALCIFY TISSUE: CPT | Mod: 26

## 2021-02-08 RX ORDER — ATORVASTATIN CALCIUM 80 MG/1
1 TABLET, FILM COATED ORAL
Qty: 0 | Refills: 0 | DISCHARGE

## 2021-02-08 RX ORDER — ONDANSETRON 8 MG/1
4 TABLET, FILM COATED ORAL EVERY 6 HOURS
Refills: 0 | Status: DISCONTINUED | OUTPATIENT
Start: 2021-02-08 | End: 2021-02-11

## 2021-02-08 RX ORDER — GABAPENTIN 400 MG/1
100 CAPSULE ORAL ONCE
Refills: 0 | Status: COMPLETED | OUTPATIENT
Start: 2021-02-08 | End: 2021-02-08

## 2021-02-08 RX ORDER — OXYCODONE HYDROCHLORIDE 5 MG/1
5 TABLET ORAL EVERY 4 HOURS
Refills: 0 | Status: DISCONTINUED | OUTPATIENT
Start: 2021-02-08 | End: 2021-02-10

## 2021-02-08 RX ORDER — ACETAMINOPHEN 500 MG
1000 TABLET ORAL EVERY 6 HOURS
Refills: 0 | Status: COMPLETED | OUTPATIENT
Start: 2021-02-08 | End: 2021-02-09

## 2021-02-08 RX ORDER — GABAPENTIN 400 MG/1
100 CAPSULE ORAL THREE TIMES A DAY
Refills: 0 | Status: DISCONTINUED | OUTPATIENT
Start: 2021-02-08 | End: 2021-02-08

## 2021-02-08 RX ORDER — ALBUTEROL 90 UG/1
3 AEROSOL, METERED ORAL
Qty: 0 | Refills: 0 | DISCHARGE

## 2021-02-08 RX ORDER — ASPIRIN/CALCIUM CARB/MAGNESIUM 324 MG
81 TABLET ORAL DAILY
Refills: 0 | Status: DISCONTINUED | OUTPATIENT
Start: 2021-02-08 | End: 2021-02-11

## 2021-02-08 RX ORDER — ALBUTEROL 90 UG/1
2 AEROSOL, METERED ORAL EVERY 6 HOURS
Refills: 0 | Status: DISCONTINUED | OUTPATIENT
Start: 2021-02-08 | End: 2021-02-11

## 2021-02-08 RX ORDER — POLYETHYLENE GLYCOL 3350 17 G/17G
17 POWDER, FOR SOLUTION ORAL AT BEDTIME
Refills: 0 | Status: DISCONTINUED | OUTPATIENT
Start: 2021-02-08 | End: 2021-02-11

## 2021-02-08 RX ORDER — FUROSEMIDE 40 MG
1 TABLET ORAL
Qty: 0 | Refills: 0 | DISCHARGE

## 2021-02-08 RX ORDER — ATORVASTATIN CALCIUM 80 MG/1
40 TABLET, FILM COATED ORAL AT BEDTIME
Refills: 0 | Status: DISCONTINUED | OUTPATIENT
Start: 2021-02-08 | End: 2021-02-11

## 2021-02-08 RX ORDER — ACETAMINOPHEN 500 MG
2 TABLET ORAL
Qty: 0 | Refills: 0 | DISCHARGE

## 2021-02-08 RX ORDER — ALBUTEROL 90 UG/1
2 AEROSOL, METERED ORAL
Qty: 0 | Refills: 0 | DISCHARGE

## 2021-02-08 RX ORDER — KETOROLAC TROMETHAMINE 30 MG/ML
30 SYRINGE (ML) INJECTION EVERY 6 HOURS
Refills: 0 | Status: DISCONTINUED | OUTPATIENT
Start: 2021-02-08 | End: 2021-02-10

## 2021-02-08 RX ORDER — FUROSEMIDE 40 MG
20 TABLET ORAL
Refills: 0 | Status: DISCONTINUED | OUTPATIENT
Start: 2021-02-08 | End: 2021-02-08

## 2021-02-08 RX ORDER — OXYCODONE HYDROCHLORIDE 5 MG/1
5 TABLET ORAL EVERY 4 HOURS
Refills: 0 | Status: DISCONTINUED | OUTPATIENT
Start: 2021-02-08 | End: 2021-02-08

## 2021-02-08 RX ORDER — CEFAZOLIN SODIUM 1 G
2000 VIAL (EA) INJECTION EVERY 8 HOURS
Refills: 0 | Status: COMPLETED | OUTPATIENT
Start: 2021-02-08 | End: 2021-02-09

## 2021-02-08 RX ORDER — BUDESONIDE AND FORMOTEROL FUMARATE DIHYDRATE 160; 4.5 UG/1; UG/1
2 AEROSOL RESPIRATORY (INHALATION)
Qty: 0 | Refills: 0 | DISCHARGE

## 2021-02-08 RX ORDER — CHLORHEXIDINE GLUCONATE 213 G/1000ML
1 SOLUTION TOPICAL ONCE
Refills: 0 | Status: COMPLETED | OUTPATIENT
Start: 2021-02-08 | End: 2021-02-08

## 2021-02-08 RX ORDER — MONTELUKAST 4 MG/1
10 TABLET, CHEWABLE ORAL AT BEDTIME
Refills: 0 | Status: DISCONTINUED | OUTPATIENT
Start: 2021-02-08 | End: 2021-02-11

## 2021-02-08 RX ORDER — FERROUS SULFATE 325(65) MG
325 TABLET ORAL DAILY
Refills: 0 | Status: DISCONTINUED | OUTPATIENT
Start: 2021-02-08 | End: 2021-02-11

## 2021-02-08 RX ORDER — ENOXAPARIN SODIUM 100 MG/ML
30 INJECTION SUBCUTANEOUS EVERY 12 HOURS
Refills: 0 | Status: DISCONTINUED | OUTPATIENT
Start: 2021-02-08 | End: 2021-02-11

## 2021-02-08 RX ORDER — TRAMADOL HYDROCHLORIDE 50 MG/1
50 TABLET ORAL EVERY 8 HOURS
Refills: 0 | Status: DISCONTINUED | OUTPATIENT
Start: 2021-02-08 | End: 2021-02-08

## 2021-02-08 RX ORDER — DULOXETINE HYDROCHLORIDE 30 MG/1
60 CAPSULE, DELAYED RELEASE ORAL DAILY
Refills: 0 | Status: DISCONTINUED | OUTPATIENT
Start: 2021-02-08 | End: 2021-02-11

## 2021-02-08 RX ORDER — DULOXETINE HYDROCHLORIDE 30 MG/1
1 CAPSULE, DELAYED RELEASE ORAL
Qty: 0 | Refills: 0 | DISCHARGE

## 2021-02-08 RX ORDER — CYCLOBENZAPRINE HYDROCHLORIDE 10 MG/1
1 TABLET, FILM COATED ORAL
Qty: 0 | Refills: 0 | DISCHARGE

## 2021-02-08 RX ORDER — SODIUM CHLORIDE 9 MG/ML
3 INJECTION INTRAMUSCULAR; INTRAVENOUS; SUBCUTANEOUS EVERY 8 HOURS
Refills: 0 | Status: DISCONTINUED | OUTPATIENT
Start: 2021-02-08 | End: 2021-02-08

## 2021-02-08 RX ORDER — CELECOXIB 200 MG/1
200 CAPSULE ORAL ONCE
Refills: 0 | Status: COMPLETED | OUTPATIENT
Start: 2021-02-08 | End: 2021-02-08

## 2021-02-08 RX ORDER — TRAMADOL HYDROCHLORIDE 50 MG/1
50 TABLET ORAL ONCE
Refills: 0 | Status: DISCONTINUED | OUTPATIENT
Start: 2021-02-08 | End: 2021-02-08

## 2021-02-08 RX ORDER — SENNA PLUS 8.6 MG/1
2 TABLET ORAL AT BEDTIME
Refills: 0 | Status: DISCONTINUED | OUTPATIENT
Start: 2021-02-08 | End: 2021-02-11

## 2021-02-08 RX ORDER — METOPROLOL TARTRATE 50 MG
1 TABLET ORAL
Qty: 0 | Refills: 0 | DISCHARGE

## 2021-02-08 RX ORDER — ONDANSETRON 8 MG/1
4 TABLET, FILM COATED ORAL ONCE
Refills: 0 | Status: COMPLETED | OUTPATIENT
Start: 2021-02-08 | End: 2021-02-08

## 2021-02-08 RX ORDER — METOPROLOL TARTRATE 50 MG
25 TABLET ORAL DAILY
Refills: 0 | Status: DISCONTINUED | OUTPATIENT
Start: 2021-02-08 | End: 2021-02-08

## 2021-02-08 RX ORDER — BUDESONIDE AND FORMOTEROL FUMARATE DIHYDRATE 160; 4.5 UG/1; UG/1
2 AEROSOL RESPIRATORY (INHALATION)
Refills: 0 | Status: DISCONTINUED | OUTPATIENT
Start: 2021-02-08 | End: 2021-02-11

## 2021-02-08 RX ORDER — ASPIRIN/CALCIUM CARB/MAGNESIUM 324 MG
1 TABLET ORAL
Qty: 0 | Refills: 0 | DISCHARGE

## 2021-02-08 RX ORDER — PANTOPRAZOLE SODIUM 20 MG/1
40 TABLET, DELAYED RELEASE ORAL
Refills: 0 | Status: DISCONTINUED | OUTPATIENT
Start: 2021-02-08 | End: 2021-02-11

## 2021-02-08 RX ORDER — OXYCODONE HYDROCHLORIDE 5 MG/1
10 TABLET ORAL EVERY 4 HOURS
Refills: 0 | Status: DISCONTINUED | OUTPATIENT
Start: 2021-02-08 | End: 2021-02-10

## 2021-02-08 RX ORDER — TAPENTADOL HYDROCHLORIDE 50 MG/1
1 TABLET, FILM COATED ORAL
Qty: 0 | Refills: 0 | DISCHARGE

## 2021-02-08 RX ORDER — SODIUM CHLORIDE 9 MG/ML
1000 INJECTION INTRAMUSCULAR; INTRAVENOUS; SUBCUTANEOUS
Refills: 0 | Status: DISCONTINUED | OUTPATIENT
Start: 2021-02-08 | End: 2021-02-08

## 2021-02-08 RX ORDER — HYDROMORPHONE HYDROCHLORIDE 2 MG/ML
0.5 INJECTION INTRAMUSCULAR; INTRAVENOUS; SUBCUTANEOUS
Refills: 0 | Status: DISCONTINUED | OUTPATIENT
Start: 2021-02-08 | End: 2021-02-08

## 2021-02-08 RX ADMIN — CELECOXIB 200 MILLIGRAM(S): 200 CAPSULE ORAL at 08:31

## 2021-02-08 RX ADMIN — CHLORHEXIDINE GLUCONATE 1 APPLICATION(S): 213 SOLUTION TOPICAL at 08:31

## 2021-02-08 RX ADMIN — ENOXAPARIN SODIUM 30 MILLIGRAM(S): 100 INJECTION SUBCUTANEOUS at 18:12

## 2021-02-08 RX ADMIN — BUDESONIDE AND FORMOTEROL FUMARATE DIHYDRATE 2 PUFF(S): 160; 4.5 AEROSOL RESPIRATORY (INHALATION) at 21:40

## 2021-02-08 RX ADMIN — SODIUM CHLORIDE 3 MILLILITER(S): 9 INJECTION INTRAMUSCULAR; INTRAVENOUS; SUBCUTANEOUS at 08:30

## 2021-02-08 RX ADMIN — ATORVASTATIN CALCIUM 40 MILLIGRAM(S): 80 TABLET, FILM COATED ORAL at 21:40

## 2021-02-08 RX ADMIN — Medication 30 MILLIGRAM(S): at 23:46

## 2021-02-08 RX ADMIN — Medication 100 MILLIGRAM(S): at 18:12

## 2021-02-08 RX ADMIN — GABAPENTIN 100 MILLIGRAM(S): 400 CAPSULE ORAL at 08:31

## 2021-02-08 RX ADMIN — Medication 1000 MILLIGRAM(S): at 18:12

## 2021-02-08 RX ADMIN — ONDANSETRON 4 MILLIGRAM(S): 8 TABLET, FILM COATED ORAL at 14:53

## 2021-02-08 RX ADMIN — TRAMADOL HYDROCHLORIDE 50 MILLIGRAM(S): 50 TABLET ORAL at 08:31

## 2021-02-08 NOTE — PHYSICAL THERAPY INITIAL EVALUATION ADULT - ADDITIONAL COMMENTS
Unable to obtain full subjective exam at this time due to patient's level of drowsiness s/p surgery. PT will re-assess patient's prior level of function during follow-up session.

## 2021-02-08 NOTE — ASU PATIENT PROFILE, ADULT - DOES PATIENT HAVE ADVANCE DIRECTIVE
; Patrice Day [phone]; Citlali Martinez [phone]; sister Diana Loaiza [phone] will make decisions in case of emergency/No

## 2021-02-08 NOTE — CONSULT NOTE ADULT - PROBLEM SELECTOR RECOMMENDATION 9
s/p right TKR   POD#0. s/p spinal.  PT with hypotension.  CHF -  low EF 36%.  Pt with chronic back pain   Routine Meds  - Acetaminophen 1 gram po q 6 hours atc for 24 hours  - dc celebrex  - dc gabapentin  - dc tramadol  Mild Pain (Pain Level 1-3)  - oxycodone 5mg po q 4 hours prn  Moderate Pain (Pain Level 4-6)  - Oxycodone 10mg po q 4 hours prn   Severe Pain ( Pain Level - 7-10)  - Toradol 30mg IVP q 6 hours prn   Bowel Regimen   - Senna and Miralax daily  OOB/PT  Plan discussed with patient and staff s/p right TKR   POD#0. s/p spinal.  PT with hypotension.  CHF -  low EF 36%.  Pt with chronic back pain. On Nucynta at home.  Non-formulary here.  Will trial oxycodone po prn here.  Caution use of fluids post operatively as pt has history of chf.   Routine Meds  - Acetaminophen 1 gram po q 6 hours atc for 24 hours  - dc celebrex  - dc gabapentin  - dc tramadol  Mild Pain (Pain Level 1-3)  - oxycodone 5mg po q 4 hours prn  Moderate Pain (Pain Level 4-6)  - Oxycodone 10mg po q 4 hours prn   Severe Pain ( Pain Level - 7-10)  - Toradol 30mg IVP q 6 hours prn   Bowel Regimen   - Senna and Miralax daily  OOB/PT  Plan discussed with patient and staff

## 2021-02-08 NOTE — CONSULT NOTE ADULT - PROBLEM SELECTOR RECOMMENDATION 9
-Hypotensive s/p OR, received fentanyl, versed, metoprolol, labetalol  -EKG noted NSR with no acute changes, cardiac enzymes negative  -Patient currently asymptomatic,   -Differentials include cardiac vs medication induced  -Likely cause for hypotension being iatrogenic in setting of administration of anesthesia and antihypertensives  -Recommend to hold off metoprolol and lasix for now  -Would be cautious with administration of fluids  -Continue to monitor blood pressure  -Plan discussed with attending Dr Wiseman

## 2021-02-08 NOTE — DISCHARGE NOTE PROVIDER - NSDCMRMEDTOKEN_GEN_ALL_CORE_FT
albuterol 2.5 mg/3 mL (0.083%) inhalation solution: 3 milliliter(s) inhaled every 6 hours, As Needed  atorvastatin 40 mg oral tablet: 1 tab(s) orally once a day  DULoxetine 60 mg oral delayed release capsule: 1 cap(s) orally once a day  furosemide 20 mg oral tablet: 1 tab(s) orally 2 times a day  Lunesta 3 mg oral tablet: 1 tab(s) orally once a day (at bedtime)  metoprolol succinate 25 mg oral tablet, extended release: 1 tab(s) orally once a day  montelukast 10 mg oral tablet: 1 tab(s) orally once a day (at bedtime)  potassium chloride 20 mEq oral tablet, extended release: 1 tab(s) orally 2 times a day  Symbicort 160 mcg-4.5 mcg/inh inhalation aerosol: 2 puff(s) inhaled 2 times a day  Ventolin HFA 90 mcg/inh inhalation aerosol: 2 puff(s) inhaled 4 times a day, As Needed   albuterol 2.5 mg/3 mL (0.083%) inhalation solution: 3 milliliter(s) inhaled every 6 hours, As Needed  aspirin 81 mg oral delayed release tablet: 1 tab(s) orally once a day  atorvastatin 40 mg oral tablet: 1 tab(s) orally once a day  DULoxetine 60 mg oral delayed release capsule: 1 cap(s) orally once a day  enoxaparin 40 mg/0.4 mL injectable solution: 40 milligram(s) subcutaneously once a day MDD:1  ferrous sulfate 325 mg (65 mg elemental iron) oral tablet: 1 tab(s) orally 2 times a day MDD:2  furosemide 20 mg oral tablet: 1 tab(s) orally 2 times a day  metoprolol succinate 25 mg oral tablet, extended release: 1 tab(s) orally once a day  MiraLax oral powder for reconstitution: 17 gram(s) orally once a day, As Needed -for constipation MDD:1  montelukast 10 mg oral tablet: 1 tab(s) orally once a day (at bedtime)  Multiple Vitamins oral tablet: 1 tab(s) orally once a day  Protonix 40 mg oral delayed release tablet: 1 tab(s) orally once a day MDD:1  Senna 8.6 mg oral tablet: 1 tab(s) orally once a day (at bedtime), As Needed -for constipation MDD:2   Symbicort 160 mcg-4.5 mcg/inh inhalation aerosol: 2 puff(s) inhaled 2 times a day  Ventolin HFA 90 mcg/inh inhalation aerosol: 2 puff(s) inhaled 4 times a day, As Needed

## 2021-02-08 NOTE — ASU PATIENT PROFILE, ADULT - HEALTHCARE INFORMATION NEEDED, PROFILE
patient encouraged to ask questions and verbalize fears/concerns as needed.  taught verbal pain scale

## 2021-02-08 NOTE — PHYSICAL THERAPY INITIAL EVALUATION ADULT - DIAGNOSIS, PT EVAL
Decline in functional mobility due to decreased strength and ROM on RLE secondary to recent surgery.

## 2021-02-08 NOTE — CONSULT NOTE ADULT - SUBJECTIVE AND OBJECTIVE BOX
Source of information: , Chart review, patient  Patient language: English  : n/a    CC: Patient is a 55y old  Female who presents with a chief complaint of S/p Right total knee replacement on 2/8/8021 (08 Feb 2021 15:29)      HPI: Pt s/p right TKR, pod#0. Pt s/p spinal.  Block - not performed. + minimal right knee pain.  Pain increases on exertion.  No nausea or vomiting.  No chest pain or sob.  Pt at edge of bed - + hypotension.  PT to assess later on floor.+ history of herniated discs, chf, and cad.       PAIN SCORE:      4/10 SCALE USED: (1-10 VNRS)    PAST MEDICAL & SURGICAL HISTORY:  Lumbar herniated disc    Seasonal allergies    Primary osteoarthritis of right knee    HTN (hypertension)    HLD (hyperlipidemia)    CHF (congestive heart failure)    CAD (coronary artery disease)  S/P stent placement    Asthma    STEMI (ST elevation myocardial infarction)    Fibromyalgia    S/P nasal septoplasty    S/P D&amp;C (status post dilation and curettage)    S/P left knee arthroscopy    S/P right oophorectomy    S/P hysterectomy    S/P coronary artery stent placement  LAD on 3/2019        FAMILY HISTORY:  Family history of brain cancer  brother    Family history of COPD (chronic obstructive pulmonary disease)  mother    FH: coronary artery disease  mother          SOCIAL HISTORY:  [x ] Denies Smoking, Alcohol, or Drug Use    Allergies    grass / pollen/ trees/ dust/ cats/ dogs------ sneezing/ wheezing/ itchy eyes/ itchy skin (Other)  No Known Drug Allergies    Intolerances        MEDICATIONS:    MEDICATIONS  (STANDING):    MEDICATIONS  (PRN):  HYDROmorphone  Injectable 0.5 milliGRAM(s) IV Push every 10 minutes PRN Moderate Pain (4 - 6)      Vital Signs Last 24 Hrs  T(C): 36.6 (08 Feb 2021 16:30), Max: 36.7 (08 Feb 2021 08:15)  T(F): 97.8 (08 Feb 2021 16:30), Max: 98.1 (08 Feb 2021 08:15)  HR: 70 (08 Feb 2021 16:30) (66 - 114)  BP: 92/62 (08 Feb 2021 16:30) (69/41 - 110/70)  BP(mean): 72 (08 Feb 2021 16:30) (68 - 87)  RR: 16 (08 Feb 2021 16:30) (11 - 18)  SpO2: 95% (08 Feb 2021 16:30) (94% - 100%)    LABS:                          11.6   7.63  )-----------( 197      ( 08 Feb 2021 13:52 )             38.3     02-08    143  |  107  |  16  ----------------------------<  113<H>  3.8   |  32<H>  |  1.06    Ca    8.6      08 Feb 2021 13:52            CAPILLARY BLOOD GLUCOSE      POCT Blood Glucose.: 108 mg/dL (08 Feb 2021 15:44)  POCT Blood Glucose.: 111 mg/dL (08 Feb 2021 07:36)      REVIEW OF SYSTEMS:  CONSTITUTIONAL: No fever or fatigue  RESPIRATORY: No cough, wheezing, chills or hemoptysis; No shortness of breath  CARDIOVASCULAR: No chest pain, palpitations, dizziness, or leg swelling  GASTROINTESTINAL: No abdominal or epigastric pain. No nausea, vomiting; No diarrhea or constipation.   GENITOURINARY: No dysuria, frequency, hematuria, retention or incontinence  MUSCULOSKELETAL: + right knee pain  NEURO: No weakness, no numbness   PSYCHIATRIC: No depression, anxiety, mood swings, or difficulty sleeping    PHYSICAL EXAM:  GENERAL:  Alert & Oriented X3, NAD, Good concentration  CHEST/LUNG: Clear to auscultation bilaterally; No rales, rhonchi, wheezing, or rubs  HEART: Regular rate and rhythm; No murmurs, rubs, or gallops  ABDOMEN: Soft, Nontender, Nondistended; Bowel sounds present  : no incontinence, no flank pain  EXTREMITIES:  2+ Peripheral Pulses, No cyanosis, or edema  MUSCULOSKELETAL: + right knee tenderness   NEUROLOGICAL: awake and alert and oriented   SKIN: No rashes or lesions    Radiology:    Drug Screen:          ORT Score   Family Hx of substance abuse	Female	Male  Alcohol 	                                              1              3  Illegal drugs	                                      2              3  Rx drugs                                               4	      4    Personal Hx of substance abuse		  Alcohol 	                                               3	      3  Illegal drugs                                  	       4	      4  Rx drugs                                                5	      5  Age between 16- 45 years	               1             1  hx preadolescent sexual abuse	       3	      0  Psychological disease		  ADD, OCD, bipolar, schizophrenia	2	      2  Depression                                    	1	      1  Score totals 		  		  a score of 3 or lower indicates low risk for opioid abuse		  a score of 4-7 indicates moderate risk for opioid abuse		  a score of 8 or higher indicates high risk for opioid abuse	    Risk factors associated with adverse outcomes related to opioid treatment  [ ]  Concurrent benzodiazepine use  [ ]  History/ Active substance use or alcohol use disorder  [ ] Psychiatric co-morbidity  [ ] Sleep apnea  [ ] COPD  [ ] BMI> 35  [ ] Liver dysfunction  [ ] Renal dysfunction  [ ] CHF  [ ] Smoker  [x ]  Age > 60 years      [x ]  [place]  Reviewed and Copied to Chart. See below.

## 2021-02-08 NOTE — DISCHARGE NOTE PROVIDER - NSDCCPCAREPLAN_GEN_ALL_CORE_FT
PRINCIPAL DISCHARGE DIAGNOSIS  Diagnosis: S/P total knee replacement, right  Assessment and Plan of Treatment: Pain Management- See Attached Medication Reconciliation  **Posterior Hip Precautions for Total hip replacement***  **StretchStrap Stretching exercises for Total knee replacement***  Weight Bearing Status: Weight bearing as tolerated to the Right lower extremity  Equipment needs: Commode, Walker  Dressing: Please keep bandage/dressing Clean, Dry, and Intact.  Incision Site: Daily Dry dressing changes  Dvt prophylaxis: D/C LOVENOX on 2/23/2021  PT/Occupational Therapy are Activities of Daily Living as appropriate  Follow up with Orthopedic Surgeon- Dr. Jones in ONE WEEK at [phone].       PRINCIPAL DISCHARGE DIAGNOSIS  Diagnosis: S/P total knee replacement, right  Assessment and Plan of Treatment: Pain Management- See Attached Medication Reconciliation  **StretchStrap Stretching exercises for Total knee replacement***  Weight Bearing Status: Weight bearing as tolerated to the Right lower extremity  Equipment needs: Commode, Walker  Dressing: Please keep bandage/dressing Clean, Dry, and Intact.  Incision Site: Daily Dry dressing changes  Dvt prophylaxis: D/C LOVENOX on 2/23/2021  PT/Occupational Therapy are Activities of Daily Living as appropriate  Follow up with Orthopedic Surgeon- Dr. Jones in ONE WEEK at [phone].

## 2021-02-08 NOTE — DISCHARGE NOTE PROVIDER - HOSPITAL COURSE
Pt is a  54 y/o F who underwent elective R Total knee replacement on 2/8/2021. No complications. Pt received daily Physical therapy and Deep vein thrombosis prophylaxis postoperatively. Stable for discharge home.

## 2021-02-08 NOTE — DISCHARGE NOTE PROVIDER - NSDCHHHOMEBOUNDOTHER_GEN_ALL_CORE_FT
S/p Right total knee replacement on 2/8/2021. Weight bearing as tolerated to the Right lower extremity.

## 2021-02-08 NOTE — PHYSICAL THERAPY INITIAL EVALUATION ADULT - GENERAL OBSERVATIONS, REHAB EVAL
Patient received supine in bed in PACU, +IV, +cardiac monitor, +SCDs, on room air. Patient currently very drowsy but agreeable to PT evaluation.

## 2021-02-08 NOTE — CONSULT NOTE ADULT - SUBJECTIVE AND OBJECTIVE BOX
MATEO SALAZAR  55y  Female      Patient is a 55y old  Female who presents with a chief complaint of S/p Right total knee replacement on 2/8/8021 (08 Feb 2021 15:29)      INTERVAL HPI/OVERNIGHT EVENTS:        REVIEW OF SYSTEMS:  CONSTITUTIONAL: No fever, weight loss, or fatigue  EYES: No eye pain, visual disturbances, or discharge  ENMT:  No difficulty hearing, tinnitus, vertigo; No sinus or throat pain  NECK: No pain or stiffness  BREASTS: No pain, masses, or nipple discharge  RESPIRATORY: No cough, wheezing, chills or hemoptysis; No shortness of breath  CARDIOVASCULAR: No chest pain, palpitations, dizziness, or leg swelling  GASTROINTESTINAL: No abdominal or epigastric pain. No nausea, vomiting, or hematemesis; No diarrhea or constipation. No melena or hematochezia.  GENITOURINARY: No dysuria, frequency, hematuria, or incontinence  NEUROLOGICAL: No headaches, memory loss, loss of strength, numbness, or tremors  SKIN: No itching, burning, rashes, or lesions   LYMPH NODES: No enlarged glands  ENDOCRINE: No heat or cold intolerance; No hair loss  MUSCULOSKELETAL: No joint pain or swelling; No muscle, back, or extremity pain  PSYCHIATRIC: No depression, anxiety, mood swings, or difficulty sleeping  HEME/LYMPH: No easy bruising, or bleeding gums  ALLERY AND IMMUNOLOGIC: No hives or eczema    T(C): 36.5 (02-08-21 @ 17:06), Max: 36.7 (02-08-21 @ 08:15)  HR: 69 (02-08-21 @ 17:21) (66 - 114)  BP: 94/60 (02-08-21 @ 17:21) (69/41 - 110/70)  RR: 17 (02-08-21 @ 17:21) (11 - 18)  SpO2: 95% (02-08-21 @ 17:21) (93% - 100%)  Wt(kg): --Vital Signs Last 24 Hrs  T(C): 36.5 (08 Feb 2021 17:06), Max: 36.7 (08 Feb 2021 08:15)  T(F): 97.7 (08 Feb 2021 17:06), Max: 98.1 (08 Feb 2021 08:15)  HR: 69 (08 Feb 2021 17:21) (66 - 114)  BP: 94/60 (08 Feb 2021 17:21) (69/41 - 110/70)  BP(mean): 58 (08 Feb 2021 17:06) (58 - 87)  RR: 17 (08 Feb 2021 17:21) (11 - 18)  SpO2: 95% (08 Feb 2021 17:21) (93% - 100%)    PHYSICAL EXAM:  GENERAL: NAD, well-groomed, well-developed  HEAD:  Atraumatic, Normocephalic  EYES: EOMI, PERRLA, conjunctiva and sclera clear  ENMT: No tonsillar erythema, exudates, or enlargement; Moist mucous membranes, Good dentition, No lesions  NECK: Supple, No JVD, Normal thyroid  NERVOUS SYSTEM:  Alert & Oriented X3, Good concentration; Motor Strength 5/5 B/L upper and lower extremities; DTRs 2+ intact and symmetric  CHEST/LUNG: Clear to percussion bilaterally; No rales, rhonchi, wheezing, or rubs  HEART: Regular rate and rhythm; No murmurs, rubs, or gallops  ABDOMEN: Soft, Nontender, Nondistended; Bowel sounds present  EXTREMITIES:  2+ Peripheral Pulses, No clubbing, cyanosis, or edema  LYMPH: No lymphadenopathy noted  SKIN: No rashes or lesions    Consultant(s) Notes Reviewed:  [x ] YES  [ ] NO  Care Discussed with Consultants/Other Providers [ x] YES  [ ] NO    LABS:                        11.6   7.63  )-----------( 197      ( 08 Feb 2021 13:52 )             38.3     02-08    143  |  107  |  16  ----------------------------<  113<H>  3.8   |  32<H>  |  1.06    Ca    8.6      08 Feb 2021 13:52          CAPILLARY BLOOD GLUCOSE      POCT Blood Glucose.: 108 mg/dL (08 Feb 2021 15:44)  POCT Blood Glucose.: 111 mg/dL (08 Feb 2021 07:36)      RADIOLOGY & ADDITIONAL TESTS:  < from: Xray Chest 2 Views PA/Lat (02.04.21 @ 19:09) >  EXAM:  XR CHEST PA LAT 2V                            PROCEDURE DATE:  02/04/2021          INTERPRETATION:  CLINICAL INDICATION: 55 years  Female with Shortness of Breath.    COMPARISON: 10/15/2019    PA and lateral views of the chest demonstrate the lungs to be clear. There is no pleural effusion. There is no pneumothorax.    The heart is normal in size. There is no mediastinal or hilar mass.  The pulmonary vasculature is normal.    Mild thoracic degenerative changes are present.    IMPRESSION:    No acute infiltrate.        Imaging Personally Reviewed:  [ ] YES  [ ] NO MARTINMATEO  55y  Female      Patient is a 55y old  Female who presents with a chief complaint of S/p Right total knee replacement on 2/8/8021 (08 Feb 2021 15:29)    HPI: Patient is a 54yo F with HTN, HLD, CAD (s/p PCI of LAD), CHF (EF of 36% in 7/2020), OA, fibromyalgia, presented to amb-surg for R TKR. Post-op patient noted to be hypotensive to sbp of 90s hence medicine consult requested. Patient assessed bedside, sitting comfortably, reports she was given spinal anesthesia for the procedure. was feeling lightheaded with nausea immediately post-op however currently asymptomatic. Denies chest pain, palpitations, shortness of breath, swelling of lower extremities. Home meds include lasix 20mg bid and metoprolol 25mg qd for her chf /htn. Anesthesia record from OR reviewed: patient received versed 2mg, fentanyl iv 25mg, 25mg, 25mg, 15mg as well as intrathecal for anesthesia in addition to metoprolol         REVIEW OF SYSTEMS:  CONSTITUTIONAL: No fever, weight loss, or fatigue  EYES: No eye pain, visual disturbances, or discharge  ENMT:  No difficulty hearing, tinnitus, vertigo; No sinus or throat pain  NECK: No pain or stiffness  BREASTS: No pain, masses, or nipple discharge  RESPIRATORY: No cough, wheezing, chills or hemoptysis; No shortness of breath  CARDIOVASCULAR: No chest pain, palpitations, dizziness, or leg swelling  GASTROINTESTINAL: No abdominal or epigastric pain. No nausea, vomiting, or hematemesis; No diarrhea or constipation. No melena or hematochezia.  GENITOURINARY: No dysuria, frequency, hematuria, or incontinence  NEUROLOGICAL: No headaches, memory loss, loss of strength, numbness, or tremors  SKIN: No itching, burning, rashes, or lesions   LYMPH NODES: No enlarged glands  ENDOCRINE: No heat or cold intolerance; No hair loss  MUSCULOSKELETAL: No joint pain or swelling; No muscle, back, or extremity pain  PSYCHIATRIC: No depression, anxiety, mood swings, or difficulty sleeping  HEME/LYMPH: No easy bruising, or bleeding gums  ALLERY AND IMMUNOLOGIC: No hives or eczema    T(C): 36.5 (02-08-21 @ 17:06), Max: 36.7 (02-08-21 @ 08:15)  HR: 69 (02-08-21 @ 17:21) (66 - 114)  BP: 94/60 (02-08-21 @ 17:21) (69/41 - 110/70)  RR: 17 (02-08-21 @ 17:21) (11 - 18)  SpO2: 95% (02-08-21 @ 17:21) (93% - 100%)  Wt(kg): --Vital Signs Last 24 Hrs  T(C): 36.5 (08 Feb 2021 17:06), Max: 36.7 (08 Feb 2021 08:15)  T(F): 97.7 (08 Feb 2021 17:06), Max: 98.1 (08 Feb 2021 08:15)  HR: 69 (08 Feb 2021 17:21) (66 - 114)  BP: 94/60 (08 Feb 2021 17:21) (69/41 - 110/70)  BP(mean): 58 (08 Feb 2021 17:06) (58 - 87)  RR: 17 (08 Feb 2021 17:21) (11 - 18)  SpO2: 95% (08 Feb 2021 17:21) (93% - 100%)    PHYSICAL EXAM:  GENERAL: NAD, well-groomed, well-developed  HEAD:  Atraumatic, Normocephalic  EYES: EOMI, PERRLA, conjunctiva and sclera clear  ENMT: No tonsillar erythema, exudates, or enlargement; Moist mucous membranes, Good dentition, No lesions  NECK: Supple, No JVD, Normal thyroid  NERVOUS SYSTEM:  Alert & Oriented X3, Good concentration; Motor Strength 5/5 B/L upper and lower extremities; DTRs 2+ intact and symmetric  CHEST/LUNG: Clear to percussion bilaterally; No rales, rhonchi, wheezing, or rubs  HEART: Regular rate and rhythm; No murmurs, rubs, or gallops  ABDOMEN: Soft, Nontender, Nondistended; Bowel sounds present  EXTREMITIES:  2+ Peripheral Pulses, No clubbing, cyanosis, or edema  LYMPH: No lymphadenopathy noted  SKIN: No rashes or lesions    Consultant(s) Notes Reviewed:  [x ] YES  [ ] NO  Care Discussed with Consultants/Other Providers [ x] YES  [ ] NO    LABS:                        11.6   7.63  )-----------( 197      ( 08 Feb 2021 13:52 )             38.3     02-08    143  |  107  |  16  ----------------------------<  113<H>  3.8   |  32<H>  |  1.06    Ca    8.6      08 Feb 2021 13:52          CAPILLARY BLOOD GLUCOSE      POCT Blood Glucose.: 108 mg/dL (08 Feb 2021 15:44)  POCT Blood Glucose.: 111 mg/dL (08 Feb 2021 07:36)      RADIOLOGY & ADDITIONAL TESTS:  < from: Xray Chest 2 Views PA/Lat (02.04.21 @ 19:09) >  EXAM:  XR CHEST PA LAT 2V                            PROCEDURE DATE:  02/04/2021          INTERPRETATION:  CLINICAL INDICATION: 55 years  Female with Shortness of Breath.    COMPARISON: 10/15/2019    PA and lateral views of the chest demonstrate the lungs to be clear. There is no pleural effusion. There is no pneumothorax.    The heart is normal in size. There is no mediastinal or hilar mass.  The pulmonary vasculature is normal.    Mild thoracic degenerative changes are present.    IMPRESSION:    No acute infiltrate.        Imaging Personally Reviewed:  [ ] YES  [ ] NO MARTINMATEO  55y  Female      Patient is a 55y old  Female who presents with a chief complaint of S/p Right total knee replacement on 2/8/8021 (08 Feb 2021 15:29)    HPI: Patient is a 54yo F with HTN, HLD, CAD (s/p PCI of LAD), CHF (EF of 36% in 7/2020), OA, fibromyalgia, presented to amb-surg for R TKR. Post-op patient noted to be hypotensive to sbp of 90s hence medicine consult requested. Patient assessed bedside, sitting comfortably, reports she was given spinal anesthesia for the procedure. was feeling lightheaded with nausea immediately post-op however currently asymptomatic. Denies chest pain, palpitations, shortness of breath, swelling of lower extremities. Home meds include lasix 20mg bid and metoprolol 25mg qd for her chf /htn. Anesthesia record from OR reviewed: patient received versed 2mg, marcaine 2mg, fentanyl iv 25mg, 25mg, 25mg, 15mg as well as intrathecal for anesthesia in addition to metoprolol. Also received phenylephrine and propofol in OR. Last blood pressure reading on floor noted 94/60        REVIEW OF SYSTEMS:  CONSTITUTIONAL: No fever, weight loss, or fatigue  EYES: No eye pain, visual disturbances, or discharge  ENMT:  No difficulty hearing, tinnitus, vertigo; No sinus or throat pain  NECK: No pain or stiffness  BREASTS: No pain, masses, or nipple discharge  RESPIRATORY: No cough, wheezing, chills or hemoptysis; No shortness of breath  CARDIOVASCULAR: No chest pain, palpitations, dizziness, or leg swelling  GASTROINTESTINAL: No abdominal or epigastric pain. No nausea, vomiting, or hematemesis; No diarrhea or constipation. No melena or hematochezia.  GENITOURINARY: No dysuria, frequency, hematuria, or incontinence  NEUROLOGICAL: No headaches, memory loss, loss of strength, numbness, or tremors  SKIN: No itching, burning, rashes, or lesions   LYMPH NODES: No enlarged glands  ENDOCRINE: No heat or cold intolerance; No hair loss  MUSCULOSKELETAL: Knee pain +; No muscle, back, or extremity pain  PSYCHIATRIC: No depression, anxiety, mood swings, or difficulty sleeping  HEME/LYMPH: No easy bruising, or bleeding gums  ALLERY AND IMMUNOLOGIC: No hives or eczema    T(C): 36.5 (02-08-21 @ 17:06), Max: 36.7 (02-08-21 @ 08:15)  HR: 69 (02-08-21 @ 17:21) (66 - 114)  BP: 94/60 (02-08-21 @ 17:21) (69/41 - 110/70)  RR: 17 (02-08-21 @ 17:21) (11 - 18)  SpO2: 95% (02-08-21 @ 17:21) (93% - 100%)  Wt(kg): --Vital Signs Last 24 Hrs  T(C): 36.5 (08 Feb 2021 17:06), Max: 36.7 (08 Feb 2021 08:15)  T(F): 97.7 (08 Feb 2021 17:06), Max: 98.1 (08 Feb 2021 08:15)  HR: 69 (08 Feb 2021 17:21) (66 - 114)  BP: 94/60 (08 Feb 2021 17:21) (69/41 - 110/70)  BP(mean): 58 (08 Feb 2021 17:06) (58 - 87)  RR: 17 (08 Feb 2021 17:21) (11 - 18)  SpO2: 95% (08 Feb 2021 17:21) (93% - 100%)    PHYSICAL EXAM:  GENERAL: NAD, well-groomed, well-developed  HEAD:  Atraumatic, Normocephalic  EYES: EOMI, PERRLA, conjunctiva and sclera clear  ENMT: No tonsillar erythema, exudates, or enlargement; Moist mucous membranes, Good dentition, No lesions  NECK: Supple, No JVD, Normal thyroid  NERVOUS SYSTEM:  Alert & Oriented X3, Good concentration; Motor Strength 5/5 B/L upper and lower extremities (limited ROM of R knee); DTRs 2+ intact and symmetric  CHEST/LUNG: Clear to auscultation bilaterally; No rales, rhonchi, wheezing, or rubs  HEART: Regular rate and rhythm; No murmurs, rubs, or gallops  ABDOMEN: Soft, Nontender, Nondistended; Bowel sounds present  EXTREMITIES:  2+ Peripheral Pulses, No clubbing, cyanosis, or edema; R knee in ace wrap  LYMPH: No lymphadenopathy noted  SKIN: No rashes or lesions    Consultant(s) Notes Reviewed:  [x ] YES  [ ] NO  Care Discussed with Consultants/Other Providers [ x] YES  [ ] NO    LABS:                        11.6   7.63  )-----------( 197      ( 08 Feb 2021 13:52 )             38.3     02-08    143  |  107  |  16  ----------------------------<  113<H>  3.8   |  32<H>  |  1.06    Ca    8.6      08 Feb 2021 13:52          CAPILLARY BLOOD GLUCOSE      POCT Blood Glucose.: 108 mg/dL (08 Feb 2021 15:44)  POCT Blood Glucose.: 111 mg/dL (08 Feb 2021 07:36)      RADIOLOGY & ADDITIONAL TESTS:  < from: Xray Chest 2 Views PA/Lat (02.04.21 @ 19:09) >  EXAM:  XR CHEST PA LAT 2V                            PROCEDURE DATE:  02/04/2021          INTERPRETATION:  CLINICAL INDICATION: 55 years  Female with Shortness of Breath.    COMPARISON: 10/15/2019    PA and lateral views of the chest demonstrate the lungs to be clear. There is no pleural effusion. There is no pneumothorax.    The heart is normal in size. There is no mediastinal or hilar mass.  The pulmonary vasculature is normal.    Mild thoracic degenerative changes are present.    IMPRESSION:    No acute infiltrate.        Imaging Personally Reviewed:  [ x ] YES  [ ] NO MARTINMATEO  55y  Female      Patient is a 55y old  Female who presents with a chief complaint of S/p Right total knee replacement on 2/8/8021 (08 Feb 2021 15:29)    HPI: Patient is a 56yo F with HTN, HLD, CAD (s/p PCI of LAD), CHF (EF of 36% in 7/2020), OA, fibromyalgia, presented to amb-surg for R TKR. Post-op patient noted to be hypotensive to sbp of 90s hence medicine consult requested. Patient assessed bedside, sitting comfortably, reports she was given spinal anesthesia for the procedure. was feeling lightheaded with nausea immediately post-op however currently asymptomatic. Denies chest pain, palpitations, shortness of breath, swelling of lower extremities. Home meds include lasix 20mg bid and metoprolol 25mg qd for her chf /htn. Anesthesia record from OR reviewed: patient received versed 2mg, marcaine 2mg, fentanyl iv 25mg, 25mg, 25mg, 15mg as well as intrathecal for anesthesia in addition to metoprolol. Also received phenylephrine and propofol in OR. Total fluids received: 700cc. Last blood pressure reading on floor noted 94/60        REVIEW OF SYSTEMS:  CONSTITUTIONAL: No fever, weight loss, or fatigue  EYES: No eye pain, visual disturbances, or discharge  ENMT:  No difficulty hearing, tinnitus, vertigo; No sinus or throat pain  NECK: No pain or stiffness  BREASTS: No pain, masses, or nipple discharge  RESPIRATORY: No cough, wheezing, chills or hemoptysis; No shortness of breath  CARDIOVASCULAR: No chest pain, palpitations, dizziness, or leg swelling  GASTROINTESTINAL: No abdominal or epigastric pain. No nausea, vomiting, or hematemesis; No diarrhea or constipation. No melena or hematochezia.  GENITOURINARY: No dysuria, frequency, hematuria, or incontinence  NEUROLOGICAL: No headaches, memory loss, loss of strength, numbness, or tremors  SKIN: No itching, burning, rashes, or lesions   LYMPH NODES: No enlarged glands  ENDOCRINE: No heat or cold intolerance; No hair loss  MUSCULOSKELETAL: Knee pain +; No muscle, back, or extremity pain  PSYCHIATRIC: No depression, anxiety, mood swings, or difficulty sleeping  HEME/LYMPH: No easy bruising, or bleeding gums  ALLERY AND IMMUNOLOGIC: No hives or eczema    T(C): 36.5 (02-08-21 @ 17:06), Max: 36.7 (02-08-21 @ 08:15)  HR: 69 (02-08-21 @ 17:21) (66 - 114)  BP: 94/60 (02-08-21 @ 17:21) (69/41 - 110/70)  RR: 17 (02-08-21 @ 17:21) (11 - 18)  SpO2: 95% (02-08-21 @ 17:21) (93% - 100%)  Wt(kg): --Vital Signs Last 24 Hrs  T(C): 36.5 (08 Feb 2021 17:06), Max: 36.7 (08 Feb 2021 08:15)  T(F): 97.7 (08 Feb 2021 17:06), Max: 98.1 (08 Feb 2021 08:15)  HR: 69 (08 Feb 2021 17:21) (66 - 114)  BP: 94/60 (08 Feb 2021 17:21) (69/41 - 110/70)  BP(mean): 58 (08 Feb 2021 17:06) (58 - 87)  RR: 17 (08 Feb 2021 17:21) (11 - 18)  SpO2: 95% (08 Feb 2021 17:21) (93% - 100%)    PHYSICAL EXAM:  GENERAL: NAD, well-groomed, well-developed  HEAD:  Atraumatic, Normocephalic  EYES: EOMI, PERRLA, conjunctiva and sclera clear  ENMT: No tonsillar erythema, exudates, or enlargement; Moist mucous membranes, Good dentition, No lesions  NECK: Supple, No JVD, Normal thyroid  NERVOUS SYSTEM:  Alert & Oriented X3, Good concentration; Motor Strength 5/5 B/L upper and lower extremities (limited ROM of R knee); DTRs 2+ intact and symmetric  CHEST/LUNG: Clear to auscultation bilaterally; No rales, rhonchi, wheezing, or rubs  HEART: Regular rate and rhythm; No murmurs, rubs, or gallops  ABDOMEN: Soft, Nontender, Nondistended; Bowel sounds present  EXTREMITIES:  2+ Peripheral Pulses, No clubbing, cyanosis, or edema; R knee in ace wrap  LYMPH: No lymphadenopathy noted  SKIN: No rashes or lesions    Consultant(s) Notes Reviewed:  [x ] YES  [ ] NO  Care Discussed with Consultants/Other Providers [ x] YES  [ ] NO    LABS:                        11.6   7.63  )-----------( 197      ( 08 Feb 2021 13:52 )             38.3     02-08    143  |  107  |  16  ----------------------------<  113<H>  3.8   |  32<H>  |  1.06    Ca    8.6      08 Feb 2021 13:52          CAPILLARY BLOOD GLUCOSE      POCT Blood Glucose.: 108 mg/dL (08 Feb 2021 15:44)  POCT Blood Glucose.: 111 mg/dL (08 Feb 2021 07:36)      RADIOLOGY & ADDITIONAL TESTS:  < from: Xray Chest 2 Views PA/Lat (02.04.21 @ 19:09) >  EXAM:  XR CHEST PA LAT 2V                            PROCEDURE DATE:  02/04/2021          INTERPRETATION:  CLINICAL INDICATION: 55 years  Female with Shortness of Breath.    COMPARISON: 10/15/2019    PA and lateral views of the chest demonstrate the lungs to be clear. There is no pleural effusion. There is no pneumothorax.    The heart is normal in size. There is no mediastinal or hilar mass.  The pulmonary vasculature is normal.    Mild thoracic degenerative changes are present.    IMPRESSION:    No acute infiltrate.        Imaging Personally Reviewed:  [ x ] YES  [ ] NO

## 2021-02-08 NOTE — DISCHARGE NOTE PROVIDER - CARE PROVIDER_API CALL
René Jones)  Orthopaedic Surgery  [address], [address]  [place] [address]  Phone: [phone]  Fax: [phone]  Follow Up Time:

## 2021-02-09 LAB
ANION GAP SERPL CALC-SCNC: 4 MMOL/L — LOW (ref 5–17)
BUN SERPL-MCNC: 17 MG/DL — SIGNIFICANT CHANGE UP (ref 7–18)
CALCIUM SERPL-MCNC: 8.6 MG/DL — SIGNIFICANT CHANGE UP (ref 8.4–10.5)
CHLORIDE SERPL-SCNC: 105 MMOL/L — SIGNIFICANT CHANGE UP (ref 96–108)
CO2 SERPL-SCNC: 32 MMOL/L — HIGH (ref 22–31)
CREAT SERPL-MCNC: 0.93 MG/DL — SIGNIFICANT CHANGE UP (ref 0.5–1.3)
GLUCOSE SERPL-MCNC: 130 MG/DL — HIGH (ref 70–99)
HCT VFR BLD CALC: 34 % — LOW (ref 34.5–45)
HGB BLD-MCNC: 10.3 G/DL — LOW (ref 11.5–15.5)
MCHC RBC-ENTMCNC: 25.4 PG — LOW (ref 27–34)
MCHC RBC-ENTMCNC: 30.3 GM/DL — LOW (ref 32–36)
MCV RBC AUTO: 84 FL — SIGNIFICANT CHANGE UP (ref 80–100)
NRBC # BLD: 0 /100 WBCS — SIGNIFICANT CHANGE UP (ref 0–0)
PLATELET # BLD AUTO: 187 K/UL — SIGNIFICANT CHANGE UP (ref 150–400)
POTASSIUM SERPL-MCNC: 3.8 MMOL/L — SIGNIFICANT CHANGE UP (ref 3.5–5.3)
POTASSIUM SERPL-SCNC: 3.8 MMOL/L — SIGNIFICANT CHANGE UP (ref 3.5–5.3)
RBC # BLD: 4.05 M/UL — SIGNIFICANT CHANGE UP (ref 3.8–5.2)
RBC # FLD: 15 % — HIGH (ref 10.3–14.5)
SODIUM SERPL-SCNC: 141 MMOL/L — SIGNIFICANT CHANGE UP (ref 135–145)
WBC # BLD: 7.84 K/UL — SIGNIFICANT CHANGE UP (ref 3.8–10.5)
WBC # FLD AUTO: 7.84 K/UL — SIGNIFICANT CHANGE UP (ref 3.8–10.5)

## 2021-02-09 PROCEDURE — 99232 SBSQ HOSP IP/OBS MODERATE 35: CPT

## 2021-02-09 PROCEDURE — 99253 IP/OBS CNSLTJ NEW/EST LOW 45: CPT

## 2021-02-09 RX ADMIN — Medication 30 MILLIGRAM(S): at 21:41

## 2021-02-09 RX ADMIN — Medication 100 MILLIGRAM(S): at 00:33

## 2021-02-09 RX ADMIN — Medication 1000 MILLIGRAM(S): at 12:05

## 2021-02-09 RX ADMIN — Medication 1000 MILLIGRAM(S): at 05:55

## 2021-02-09 RX ADMIN — ENOXAPARIN SODIUM 30 MILLIGRAM(S): 100 INJECTION SUBCUTANEOUS at 05:56

## 2021-02-09 RX ADMIN — ENOXAPARIN SODIUM 30 MILLIGRAM(S): 100 INJECTION SUBCUTANEOUS at 17:31

## 2021-02-09 RX ADMIN — BUDESONIDE AND FORMOTEROL FUMARATE DIHYDRATE 2 PUFF(S): 160; 4.5 AEROSOL RESPIRATORY (INHALATION) at 12:07

## 2021-02-09 RX ADMIN — ATORVASTATIN CALCIUM 40 MILLIGRAM(S): 80 TABLET, FILM COATED ORAL at 21:41

## 2021-02-09 RX ADMIN — DULOXETINE HYDROCHLORIDE 60 MILLIGRAM(S): 30 CAPSULE, DELAYED RELEASE ORAL at 12:05

## 2021-02-09 RX ADMIN — OXYCODONE HYDROCHLORIDE 10 MILLIGRAM(S): 5 TABLET ORAL at 14:01

## 2021-02-09 RX ADMIN — Medication 1 TABLET(S): at 12:05

## 2021-02-09 RX ADMIN — Medication 325 MILLIGRAM(S): at 12:06

## 2021-02-09 RX ADMIN — OXYCODONE HYDROCHLORIDE 10 MILLIGRAM(S): 5 TABLET ORAL at 00:42

## 2021-02-09 RX ADMIN — BUDESONIDE AND FORMOTEROL FUMARATE DIHYDRATE 2 PUFF(S): 160; 4.5 AEROSOL RESPIRATORY (INHALATION) at 23:06

## 2021-02-09 RX ADMIN — Medication 30 MILLIGRAM(S): at 09:56

## 2021-02-09 RX ADMIN — PANTOPRAZOLE SODIUM 40 MILLIGRAM(S): 20 TABLET, DELAYED RELEASE ORAL at 05:56

## 2021-02-09 RX ADMIN — MONTELUKAST 10 MILLIGRAM(S): 4 TABLET, CHEWABLE ORAL at 21:41

## 2021-02-09 RX ADMIN — Medication 81 MILLIGRAM(S): at 12:05

## 2021-02-09 RX ADMIN — Medication 1000 MILLIGRAM(S): at 00:33

## 2021-02-09 NOTE — PROGRESS NOTE ADULT - SUBJECTIVE AND OBJECTIVE BOX
Source of information: , Chart review  Patient language: English  : n/a    CC:  right knee pain    This is a 55yFemale patient who presents to the hospital for Patient is a 55y old  Female who presents with a chief complaint of S/p R TKA (09 Feb 2021 09:06)    Pt s/p right TKR, pod#1.  + right knee pain. Pain increases on exertion.  No nausea or vomiting.  No chest pain or sob.  + dizziness.  Pt was oob to chair with PT.  Pt unable to ambulate due to pain and stiffness.  Blood pressure better today.     PAIN SCORE:    5/10     SCALE USED: (1-10 NRS)      PAST MEDICAL & SURGICAL HISTORY:  Lumbar herniated disc    Seasonal allergies    Primary osteoarthritis of right knee    HTN (hypertension)    HLD (hyperlipidemia)    CHF (congestive heart failure)    CAD (coronary artery disease)  S/P stent placement    Asthma    STEMI (ST elevation myocardial infarction)    Fibromyalgia    S/P nasal septoplasty    S/P D&amp;C (status post dilation and curettage)    S/P left knee arthroscopy    S/P right oophorectomy    S/P hysterectomy    S/P coronary artery stent placement  LAD on 3/2019        FAMILY HISTORY:  Family history of brain cancer  brother    Family history of COPD (chronic obstructive pulmonary disease)  mother    FH: coronary artery disease  mother          SOCIAL HISTORY:  [ x] Denies Smoking, Alcohol, or Drug Use    Allergies    grass / pollen/ trees/ dust/ cats/ dogs------ sneezing/ wheezing/ itchy eyes/ itchy skin (Other)  No Known Drug Allergies    Intolerances        MEDICATIONS:    MEDICATIONS  (STANDING):  aspirin enteric coated 81 milliGRAM(s) Oral daily  atorvastatin 40 milliGRAM(s) Oral at bedtime  budesonide 160 MICROgram(s)/formoterol 4.5 MICROgram(s) Inhaler 2 Puff(s) Inhalation two times a day  DULoxetine 60 milliGRAM(s) Oral daily  enoxaparin Injectable 30 milliGRAM(s) SubCutaneous every 12 hours  ferrous    sulfate 325 milliGRAM(s) Oral daily  montelukast 10 milliGRAM(s) Oral at bedtime  multivitamin 1 Tablet(s) Oral daily  pantoprazole    Tablet 40 milliGRAM(s) Oral before breakfast  polyethylene glycol 3350 17 Gram(s) Oral at bedtime  senna 2 Tablet(s) Oral at bedtime    MEDICATIONS  (PRN):  ALBUTerol    90 MICROgram(s) HFA Inhaler 2 Puff(s) Inhalation every 6 hours PRN Shortness of Breath and/or Wheezing  ketorolac   Injectable 30 milliGRAM(s) IV Push every 6 hours PRN Severe Pain (7 - 10)  ondansetron Injectable 4 milliGRAM(s) IV Push every 6 hours PRN Nausea and/or Vomiting  oxyCODONE    IR 5 milliGRAM(s) Oral every 4 hours PRN Mild Pain (1 - 3)  oxyCODONE    IR 10 milliGRAM(s) Oral every 4 hours PRN Moderate Pain (4 - 6)      Vital Signs Last 24 Hrs  T(C): 36.6 (09 Feb 2021 12:31), Max: 36.8 (09 Feb 2021 05:49)  T(F): 97.8 (09 Feb 2021 12:31), Max: 98.2 (09 Feb 2021 05:49)  HR: 98 (09 Feb 2021 12:31) (66 - 98)  BP: 112/75 (09 Feb 2021 12:31) (69/41 - 112/75)  BP(mean): 67 (08 Feb 2021 22:27) (58 - 87)  RR: 18 (09 Feb 2021 12:31) (11 - 18)  SpO2: 95% (09 Feb 2021 12:31) (93% - 100%)    LABS:                          10.3   7.84  )-----------( 187      ( 09 Feb 2021 08:19 )             34.0     02-09    141  |  105  |  17  ----------------------------<  130<H>  3.8   |  32<H>  |  0.93    Ca    8.6      09 Feb 2021 08:19            CAPILLARY BLOOD GLUCOSE      POCT Blood Glucose.: 108 mg/dL (08 Feb 2021 15:44)      Radiology:    Drug Screen:        REVIEW OF SYSTEMS:  CONSTITUTIONAL: No fever or fatigue  RESPIRATORY: No cough, wheezing, chills or hemoptysis; No shortness of breath  CARDIOVASCULAR: No chest pain, palpitations, dizziness, or leg swelling  GASTROINTESTINAL: No abdominal or epigastric pain. No nausea, vomiting; No diarrhea or constipation.   GENITOURINARY: No dysuria, frequency, hematuria, retention or incontinence  MUSCULOSKELETAL: + right knee pain  NEURO: No headaches, No numbness/tingling b/l LE, No weakness  PSYCHIATRIC: No depression, anxiety, mood swings, or difficulty sleeping    PHYSICAL EXAM:  GENERAL:  Alert & Oriented X3, NAD, Good concentration  CHEST/LUNG: Clear to auscultation bilaterally; No rales, rhonchi, wheezing, or rubs  HEART: Regular rate and rhythm; No murmurs, rubs, or gallops  ABDOMEN: Soft, Nontender, Nondistended; Bowel sounds present  EXTREMITIES:  2+ Peripheral Pulses, No cyanosis, or edema  MUSCULOSKELETAL: + decreased rom + right knee tenderness   SKIN: No rashes or lesions    Risk factors associated with adverse outcomes related to opioid treatment  [ ]  Concurrent benzodiazepine use  [ ]  History/ Active substance use or alcohol use disorder  [ ] Psychiatric co-morbidity  [ ] Sleep apnea  [ ] COPD  [ ] BMI> 35  [ ] Liver dysfunction  [ ] Renal dysfunction  [ ] CHF  [ ] Smoker  [x  Age > 60 years    [x ]  [place]  Reviewed and Copied to Chart. See below.    Plan of care and goal oriented pain management treatment options were discussed with patient and /or primary care giver; all questions and concerns were addressed and care was aligned with patient's wishes.    Educated patient on goal oriented pain management treatment options     02-09-21 @ 12:32

## 2021-02-09 NOTE — CONSULT NOTE ADULT - SUBJECTIVE AND OBJECTIVE BOX
Patient is a 55y old  Female who presents with a chief complaint of S/p Right total knee replacement on 2/8/8021 (08 Feb 2021 15:29)    HPI: Patient is a 54yo F with HTN, HLD, CAD (s/p PCI of LAD), CHF (EF of 36% in 7/2020), OA, fibromyalgia, presented to amb-surg for R TKR. Post-op patient noted to be hypotensive to sbp of 90s hence medicine consult requested. Patient assessed bedside, sitting comfortably, reports she was given spinal anesthesia for the procedure. was feeling lightheaded with nausea immediately post-op however currently asymptomatic. Denies chest pain, palpitations, shortness of breath, swelling of lower extremities. Home meds include lasix 20mg bid and metoprolol 25mg qd for her chf /htn. Anesthesia record from OR reviewed: patient received versed 2mg, marcaine 2mg, fentanyl iv 25mg, 25mg, 25mg, 15mg as well as intrathecal for anesthesia in addition to metoprolol. Also received phenylephrine and propofol in OR. Total fluids received: 700cc. Last blood pressure reading on floor noted 94/60.   Patient home medication Lasix po and Metoprolol were held. Today BP better, reports lighheadedness is improving.         REVIEW OF SYSTEMS:  CONSTITUTIONAL: No fever, weight loss, or fatigue  EYES: No eye pain, visual disturbances, or discharge  ENMT:  No difficulty hearing, tinnitus, vertigo; No sinus or throat pain  NECK: No pain or stiffness  BREASTS: No pain, masses, or nipple discharge  RESPIRATORY: No cough, wheezing, chills or hemoptysis; No shortness of breath  CARDIOVASCULAR: No chest pain, palpitations, dizziness, or leg swelling  GASTROINTESTINAL: No abdominal or epigastric pain. No nausea, vomiting, or hematemesis; No diarrhea or constipation. No melena or hematochezia.  GENITOURINARY: No dysuria, frequency, hematuria, or incontinence  NEUROLOGICAL: No headaches, memory loss, loss of strength, numbness, or tremors  SKIN: No itching, burning, rashes, or lesions   LYMPH NODES: No enlarged glands  ENDOCRINE: No heat or cold intolerance; No hair loss  MUSCULOSKELETAL: Knee pain +; No muscle, back, or extremity pain  PSYCHIATRIC: No depression, anxiety, mood swings, or difficulty sleeping  HEME/LYMPH: No easy bruising, or bleeding gums  ALLERY AND IMMUNOLOGIC: No hives or eczema    Vital Signs Last 24 Hrs  T(C): 36.6 (09 Feb 2021 12:31), Max: 36.8 (09 Feb 2021 05:49)  T(F): 97.8 (09 Feb 2021 12:31), Max: 98.2 (09 Feb 2021 05:49)  HR: 108 (09 Feb 2021 16:01) (75 - 108)  BP: 105/66 (09 Feb 2021 16:01) (86/61 - 118/78)  BP(mean): 67 (08 Feb 2021 22:27) (67 - 67)  RR: 18 (09 Feb 2021 12:31) (17 - 18)  SpO2: 95% (09 Feb 2021 16:01) (95% - 99%)    PHYSICAL EXAM:  GENERAL: NAD, well-groomed, well-developed  HEAD:  Atraumatic, Normocephalic  EYES: EOMI, PERRLA, conjunctiva and sclera clear  ENMT: No tonsillar erythema, exudates, or enlargement; Moist mucous membranes, Good dentition, No lesions  NECK: Supple, No JVD, Normal thyroid  NERVOUS SYSTEM:  Alert & Oriented X3, Good concentration; Motor Strength 5/5 B/L upper and lower extremities (limited ROM of R knee); DTRs 2+ intact and symmetric. surgical dressing over right knee in place  CHEST/LUNG: Clear to auscultation bilaterally; No rales, rhonchi, wheezing, or rubs  HEART: Regular rate and rhythm; No murmurs, rubs, or gallops  ABDOMEN: Soft, Nontender, Nondistended; Bowel sounds present  EXTREMITIES:  2+ Peripheral Pulses, No clubbing, cyanosis, or edema; R knee in ace wrap  LYMPH: No lymphadenopathy noted  SKIN: No rashes or lesions    Consultant(s) Notes Reviewed:  [x ] YES  [ ] NO  Care Discussed with Consultants/Other Providers [ x] YES  [ ] NO    LABS:                                   10.3   7.84  )-----------( 187      ( 09 Feb 2021 08:19 )             34.0       02-09    141  |  105  |  17  ----------------------------<  130<H>  3.8   |  32<H>  |  0.93    Ca    8.6      09 Feb 2021 08:19        RADIOLOGY & ADDITIONAL TESTS:  < from: Xray Chest 2 Views PA/Lat (02.04.21 @ 19:09) >  EXAM:  XR CHEST PA LAT 2V                            PROCEDURE DATE:  02/04/2021          INTERPRETATION:  CLINICAL INDICATION: 55 years  Female with Shortness of Breath.    COMPARISON: 10/15/2019    PA and lateral views of the chest demonstrate the lungs to be clear. There is no pleural effusion. There is no pneumothorax.    The heart is normal in size. There is no mediastinal or hilar mass.  The pulmonary vasculature is normal.    Mild thoracic degenerative changes are present.    IMPRESSION:    No acute infiltrate.        Imaging Personally Reviewed:  [ x ] YES  [ ] NO

## 2021-02-09 NOTE — PROGRESS NOTE ADULT - SUBJECTIVE AND OBJECTIVE BOX
55yFemale    Diagnosis:  S/p R Total Knee Replacement POD# 1    Patient was seen and evaluated at bedside. Pt was admitted overnight due to hypotension- Pt states she feels "better today"- Blood pressure stabilized overnight.  Patient with no acute complaints.   Pain is controlled to the RLE.   Denies CP/SOB, dyspnea, paresthesias, N/V/D, palpitations.     Vital Signs Last 24 Hrs  T(C): 36.8 (09 Feb 2021 05:49), Max: 36.8 (09 Feb 2021 05:49)  T(F): 98.2 (09 Feb 2021 05:49), Max: 98.2 (09 Feb 2021 05:49)  HR: 95 (09 Feb 2021 05:49) (66 - 95)  BP: 112/71 (09 Feb 2021 05:49) (69/41 - 112/71)  BP(mean): 67 (08 Feb 2021 22:27) (58 - 87)  RR: 18 (09 Feb 2021 05:49) (11 - 18)  SpO2: 95% (09 Feb 2021 05:49) (93% - 100%)  I&O's Detail    08 Feb 2021 07:01  -  09 Feb 2021 07:00  --------------------------------------------------------  IN:    Lactated Ringers Bolus: 700 mL  Total IN: 700 mL    OUT:    Estimated Blood Loss (mL): 100 mL    Voided (mL): 200 mL  Total OUT: 300 mL    Total NET: 400 mL    Physical Exam:    General: AAOx3, NAD, resting comfortably in bed.    R KNEE:  Dressing is C/D/I w/stained blood noted- Dressing changed today- No active drainage, skin edges well approximated, (+) Ecchymosis to per-incisional area, No erythema, No effusion. Skin is pink and warm. SILT.   Lower extremity:  No calf tenderness, calves are soft. 2+pulses. NVI. (+)EHL/FHL/ADF/APF.  Good capillary refill.                           10.3   7.84  )-----------( 187      ( 09 Feb 2021 08:19 )             34.0     02-09    141  |  105  |  17  ----------------------------<  130<H>  3.8   |  32<H>  |  0.93    Ca    8.6      09 Feb 2021 08:19        Impression:  54 y/o Female S/p R Total Knee Replacement POD# 1  Plan:  -  Pain control  -  DVT prophylaxis  -  Daily Physical Therapy:  WBAT to RLE  -  Discharge planning: Home Vs. Rehab pending Physical therapy eval.  -  Heel bump to RLE  -  Dressing changed today  -  Incentive Spirometer  -  Continue with Post-op Antibiotics x 24hrs  -  Appreciate Medical recommendations- hold BP meds and lasix   -  Case d/w Dr. Jones

## 2021-02-09 NOTE — CONSULT NOTE ADULT - PROBLEM SELECTOR RECOMMENDATION 9
-Hypotensive s/p OR, received fentanyl, versed, metoprolol, labetalol  -EKG noted NSR with no acute changes, cardiac enzymes negative  -Patient currently asymptomatic,   -Differentials include cardiac vs medication induced  -Likely cause for hypotension being iatrogenic in setting of administration of anesthesia and antihypertensives  -metoprolol and lasix held  -Would be cautious with administration of fluids, given h/o ICM  -Continue to monitor blood pressure

## 2021-02-10 LAB
ANION GAP SERPL CALC-SCNC: 5 MMOL/L — SIGNIFICANT CHANGE UP (ref 5–17)
BUN SERPL-MCNC: 17 MG/DL — SIGNIFICANT CHANGE UP (ref 7–18)
CALCIUM SERPL-MCNC: 8.5 MG/DL — SIGNIFICANT CHANGE UP (ref 8.4–10.5)
CHLORIDE SERPL-SCNC: 100 MMOL/L — SIGNIFICANT CHANGE UP (ref 96–108)
CO2 SERPL-SCNC: 32 MMOL/L — HIGH (ref 22–31)
CREAT SERPL-MCNC: 0.85 MG/DL — SIGNIFICANT CHANGE UP (ref 0.5–1.3)
GLUCOSE SERPL-MCNC: 105 MG/DL — HIGH (ref 70–99)
HCT VFR BLD CALC: 31.1 % — LOW (ref 34.5–45)
HGB BLD-MCNC: 9.7 G/DL — LOW (ref 11.5–15.5)
MCHC RBC-ENTMCNC: 25.9 PG — LOW (ref 27–34)
MCHC RBC-ENTMCNC: 31.2 GM/DL — LOW (ref 32–36)
MCV RBC AUTO: 83.2 FL — SIGNIFICANT CHANGE UP (ref 80–100)
NRBC # BLD: 0 /100 WBCS — SIGNIFICANT CHANGE UP (ref 0–0)
PLATELET # BLD AUTO: 186 K/UL — SIGNIFICANT CHANGE UP (ref 150–400)
POTASSIUM SERPL-MCNC: 3.9 MMOL/L — SIGNIFICANT CHANGE UP (ref 3.5–5.3)
POTASSIUM SERPL-SCNC: 3.9 MMOL/L — SIGNIFICANT CHANGE UP (ref 3.5–5.3)
RBC # BLD: 3.74 M/UL — LOW (ref 3.8–5.2)
RBC # FLD: 15.1 % — HIGH (ref 10.3–14.5)
SODIUM SERPL-SCNC: 137 MMOL/L — SIGNIFICANT CHANGE UP (ref 135–145)
WBC # BLD: 10.39 K/UL — SIGNIFICANT CHANGE UP (ref 3.8–10.5)
WBC # FLD AUTO: 10.39 K/UL — SIGNIFICANT CHANGE UP (ref 3.8–10.5)

## 2021-02-10 PROCEDURE — 99253 IP/OBS CNSLTJ NEW/EST LOW 45: CPT

## 2021-02-10 PROCEDURE — 99232 SBSQ HOSP IP/OBS MODERATE 35: CPT

## 2021-02-10 RX ORDER — OXYCODONE HYDROCHLORIDE 5 MG/1
15 TABLET ORAL ONCE
Refills: 0 | Status: DISCONTINUED | OUTPATIENT
Start: 2021-02-10 | End: 2021-02-10

## 2021-02-10 RX ORDER — FUROSEMIDE 40 MG
20 TABLET ORAL EVERY 12 HOURS
Refills: 0 | Status: DISCONTINUED | OUTPATIENT
Start: 2021-02-11 | End: 2021-02-11

## 2021-02-10 RX ORDER — METOPROLOL TARTRATE 50 MG
25 TABLET ORAL DAILY
Refills: 0 | Status: DISCONTINUED | OUTPATIENT
Start: 2021-02-10 | End: 2021-02-10

## 2021-02-10 RX ORDER — OXYCODONE HYDROCHLORIDE 5 MG/1
15 TABLET ORAL EVERY 6 HOURS
Refills: 0 | Status: DISCONTINUED | OUTPATIENT
Start: 2021-02-10 | End: 2021-02-11

## 2021-02-10 RX ORDER — FUROSEMIDE 40 MG
20 TABLET ORAL ONCE
Refills: 0 | Status: COMPLETED | OUTPATIENT
Start: 2021-02-10 | End: 2021-02-10

## 2021-02-10 RX ORDER — HYDROMORPHONE HYDROCHLORIDE 2 MG/ML
4 INJECTION INTRAMUSCULAR; INTRAVENOUS; SUBCUTANEOUS ONCE
Refills: 0 | Status: DISCONTINUED | OUTPATIENT
Start: 2021-02-10 | End: 2021-02-10

## 2021-02-10 RX ORDER — METOPROLOL TARTRATE 50 MG
25 TABLET ORAL DAILY
Refills: 0 | Status: DISCONTINUED | OUTPATIENT
Start: 2021-02-10 | End: 2021-02-11

## 2021-02-10 RX ADMIN — MONTELUKAST 10 MILLIGRAM(S): 4 TABLET, CHEWABLE ORAL at 21:30

## 2021-02-10 RX ADMIN — Medication 81 MILLIGRAM(S): at 11:40

## 2021-02-10 RX ADMIN — ATORVASTATIN CALCIUM 40 MILLIGRAM(S): 80 TABLET, FILM COATED ORAL at 21:29

## 2021-02-10 RX ADMIN — BUDESONIDE AND FORMOTEROL FUMARATE DIHYDRATE 2 PUFF(S): 160; 4.5 AEROSOL RESPIRATORY (INHALATION) at 10:00

## 2021-02-10 RX ADMIN — Medication 1 TABLET(S): at 11:40

## 2021-02-10 RX ADMIN — Medication 325 MILLIGRAM(S): at 11:40

## 2021-02-10 RX ADMIN — HYDROMORPHONE HYDROCHLORIDE 4 MILLIGRAM(S): 2 INJECTION INTRAMUSCULAR; INTRAVENOUS; SUBCUTANEOUS at 10:42

## 2021-02-10 RX ADMIN — Medication 20 MILLIGRAM(S): at 12:09

## 2021-02-10 RX ADMIN — OXYCODONE HYDROCHLORIDE 10 MILLIGRAM(S): 5 TABLET ORAL at 01:40

## 2021-02-10 RX ADMIN — ENOXAPARIN SODIUM 30 MILLIGRAM(S): 100 INJECTION SUBCUTANEOUS at 17:05

## 2021-02-10 RX ADMIN — DULOXETINE HYDROCHLORIDE 60 MILLIGRAM(S): 30 CAPSULE, DELAYED RELEASE ORAL at 11:40

## 2021-02-10 RX ADMIN — Medication 25 MILLIGRAM(S): at 17:05

## 2021-02-10 RX ADMIN — BUDESONIDE AND FORMOTEROL FUMARATE DIHYDRATE 2 PUFF(S): 160; 4.5 AEROSOL RESPIRATORY (INHALATION) at 21:31

## 2021-02-10 RX ADMIN — Medication 25 MILLIGRAM(S): at 10:48

## 2021-02-10 RX ADMIN — OXYCODONE HYDROCHLORIDE 10 MILLIGRAM(S): 5 TABLET ORAL at 05:51

## 2021-02-10 RX ADMIN — ENOXAPARIN SODIUM 30 MILLIGRAM(S): 100 INJECTION SUBCUTANEOUS at 05:33

## 2021-02-10 NOTE — CONSULT NOTE ADULT - SUBJECTIVE AND OBJECTIVE BOX
Patient is a 55y old  Female who presents with a chief complaint of S/p Right total knee replacement on 2/8/8021 (08 Feb 2021 15:29)    HPI: Patient is a 56yo F with HTN, HLD, CAD (s/p PCI of LAD), CHF (EF of 36% in 7/2020), OA, fibromyalgia, presented to amb-surg for R TKR. Post-op patient noted to be hypotensive to sbp of 90s hence medicine consult requested. Patient assessed bedside, sitting comfortably, reports she was given spinal anesthesia for the procedure. was feeling lightheaded with nausea immediately post-op however currently asymptomatic. Denies chest pain, palpitations, shortness of breath, swelling of lower extremities. Home meds include lasix 20mg bid and metoprolol 25mg qd for her chf /htn. Anesthesia record from OR reviewed: patient received versed 2mg, marcaine 2mg, fentanyl iv 25mg, 25mg, 25mg, 15mg as well as intrathecal for anesthesia in addition to metoprolol. Also received phenylephrine and propofol in OR. Total fluids received: 700cc. Last blood pressure reading on floor noted 94/60.   Patient home medication Lasix po and Metoprolol were held. Patient reports lightheadedness has improved. C/o right knee pain at surgical site.  Today BP better. Patient noted to tachycardiac -130,         REVIEW OF SYSTEMS:  CONSTITUTIONAL: No fever, weight loss, or fatigue  EYES: No eye pain, visual disturbances, or discharge  ENMT:  No difficulty hearing, tinnitus, vertigo; No sinus or throat pain  NECK: No pain or stiffness  BREASTS: No pain, masses, or nipple discharge  RESPIRATORY: No cough, wheezing, chills or hemoptysis; No shortness of breath  CARDIOVASCULAR: No chest pain, palpitations, dizziness, or leg swelling  GASTROINTESTINAL: No abdominal or epigastric pain. No nausea, vomiting, or hematemesis; No diarrhea or constipation. No melena or hematochezia.  GENITOURINARY: No dysuria, frequency, hematuria, or incontinence  NEUROLOGICAL: No headaches, memory loss, loss of strength, numbness, or tremors  SKIN: No itching, burning, rashes, or lesions   LYMPH NODES: No enlarged glands  ENDOCRINE: No heat or cold intolerance; No hair loss  MUSCULOSKELETAL: Knee pain +; No muscle, back, or extremity pain  PSYCHIATRIC: No depression, anxiety, mood swings, or difficulty sleeping  HEME/LYMPH: No easy bruising, or bleeding gums  ALLERY AND IMMUNOLOGIC: No hives or eczema    Vital Signs Last 24 Hrs  T(C): 36.7 (10 Feb 2021 14:29), Max: 37.4 (09 Feb 2021 20:23)  T(F): 98 (10 Feb 2021 14:29), Max: 99.4 (10 Feb 2021 05:25)  HR: 104 (10 Feb 2021 14:29) (102 - 130)  BP: 103/67 (10 Feb 2021 14:29) (103/67 - 129/69)  BP(mean): --  RR: 18 (10 Feb 2021 14:29) (18 - 18)  SpO2: 95% (10 Feb 2021 14:29) (95% - 100%)    PHYSICAL EXAM:  GENERAL: NAD, well-groomed, well-developed  HEAD:  Atraumatic, Normocephalic  EYES: EOMI, PERRLA, conjunctiva and sclera clear  ENMT: No tonsillar erythema, exudates, or enlargement; Moist mucous membranes, Good dentition, No lesions  NECK: Supple, No JVD, Normal thyroid  NERVOUS SYSTEM:  Alert & Oriented X3, Good concentration; Motor Strength 5/5 B/L upper and lower extremities (limited ROM of R knee); DTRs 2+ intact and symmetric. surgical dressing over right knee in place  CHEST/LUNG: Clear to auscultation bilaterally; No rales, rhonchi, wheezing, or rubs  HEART: Regular rate and rhythm; No murmurs, rubs, or gallops  ABDOMEN: Soft, Nontender, Nondistended; Bowel sounds present  EXTREMITIES:  2+ Peripheral Pulses, No clubbing, cyanosis, or edema; R knee in ace wrap  LYMPH: No lymphadenopathy noted  SKIN: No rashes or lesions    Consultant(s) Notes Reviewed:  [x ] YES  [ ] NO  Care Discussed with Consultants/Other Providers [ x] YES  [ ] NO    LABS:                                               9.7    10.39 )-----------( 186      ( 10 Feb 2021 06:49 )             31.1       02-10    137  |  100  |  17  ----------------------------<  105<H>  3.9   |  32<H>  |  0.85    Ca    8.5      10 Feb 2021 06:49        RADIOLOGY & ADDITIONAL TESTS:  < from: Xray Chest 2 Views PA/Lat (02.04.21 @ 19:09) >  EXAM:  XR CHEST PA LAT 2V                            PROCEDURE DATE:  02/04/2021          INTERPRETATION:  CLINICAL INDICATION: 55 years  Female with Shortness of Breath.    COMPARISON: 10/15/2019    PA and lateral views of the chest demonstrate the lungs to be clear. There is no pleural effusion. There is no pneumothorax.    The heart is normal in size. There is no mediastinal or hilar mass.  The pulmonary vasculature is normal.    Mild thoracic degenerative changes are present.    IMPRESSION:    No acute infiltrate.        Imaging Personally Reviewed:  [ x ] YES  [ ] NO

## 2021-02-10 NOTE — CONSULT NOTE ADULT - PROBLEM SELECTOR RECOMMENDATION 9
-Hypotensive s/p OR, received fentanyl, versed, metoprolol, labetalol  -EKG was done noted NSR with no acute changes, cardiac enzymes negative  -Patient currently asymptomatic,   -Likely cause for hypotension being iatrogenic in setting of administration of anesthesia and antihypertensives  -metoprolol and lasix were held  - BP is improved, but patient is tachycardiac today likely rebound tachycardia from stopping BB. Resumed Metoprolol ER 25 od today (2/10).  - monitor vitals.

## 2021-02-10 NOTE — PROGRESS NOTE ADULT - SUBJECTIVE AND OBJECTIVE BOX
55yFemale    Diagnosis:  S/p R Total Knee Replacement POD# 2    Patient was seen and evaluated at bedside. Pt was admitted last night due to hypotension- Pt states she feels "better today"- Blood pressure stabilized overnight.  Patient with no acute complaints.   Pain is controlled to the RLE. PT was seen by physical therapy and has been able to stand and walk with walker.   Denies CP/SOB, dyspnea, paresthesias, N/V/D, palpitations.     Vital Signs Last 24 Hrs  T(C): 37.4 (10 Feb 2021 05:25), Max: 37.4 (09 Feb 2021 20:23)  T(F): 99.4 (10 Feb 2021 05:25), Max: 99.4 (10 Feb 2021 05:25)  HR: 109 (10 Feb 2021 05:25) (88 - 109)  BP: 116/76 (10 Feb 2021 05:25) (105/66 - 129/69)  BP(mean): --  RR: 18 (10 Feb 2021 05:25) (18 - 18)  SpO2: 100% (10 Feb 2021 05:25) (95% - 100%)      Physical Exam:    General: AAOx3, NAD, resting comfortably in bed.    R KNEE:  Dressing is C/D/I - No active drainage, skin edges well approximated, No erythema, No effusion. Skin is pink and warm. SILT.   Lower extremity:  No calf tenderness, calves are soft. 2+pulses. NVI. (+)EHL/FHL/ADF/APF.  Good capillary refill.                02-10    137  |  100  |  17  ----------------------------<  105<H>  3.9   |  32<H>  |  0.85    Ca    8.5      10 Feb 2021 06:49                          9.7    10.39 )-----------( 186      ( 10 Feb 2021 06:49 )             31.1         Impression:  54 y/o Female S/p R Total Knee Replacement POD# 1  Plan:  -  Pain control  -  DVT prophylaxis  -  Daily Physical Therapy:  WBAT to RLE  -  Discharge planning: Home Vs. Rehab pending Physical therapy eval.  -  Heel bump to RLE  -  Incentive Spirometer  -  Appreciate Medical recommendations- hold BP meds and lasix   -  Case d/w Dr. Jones    55yFemale    Diagnosis:  S/p R Total Knee Replacement POD# 2    Patient was seen and evaluated at bedside. Pt was admitted last night due to hypotension- Pt states she feels "better today"- Blood pressure stabilized overnight.  Patient with no acute complaints.   Pain is controlled to the RLE. PT was seen by physical therapy and has been able to stand and walk with walker.   Denies CP/SOB, dyspnea, paresthesias, N/V/D, palpitations.     Vital Signs Last 24 Hrs  T(C): 37.4 (10 Feb 2021 05:25), Max: 37.4 (09 Feb 2021 20:23)  T(F): 99.4 (10 Feb 2021 05:25), Max: 99.4 (10 Feb 2021 05:25)  HR: 109 (10 Feb 2021 05:25) (88 - 109)  BP: 116/76 (10 Feb 2021 05:25) (105/66 - 129/69)  BP(mean): --  RR: 18 (10 Feb 2021 05:25) (18 - 18)  SpO2: 100% (10 Feb 2021 05:25) (95% - 100%)      Physical Exam:    General: AAOx3, NAD, resting comfortably in bed.    R KNEE:  Dressing is C/D/I - No active drainage, skin edges well approximated, No erythema, No effusion. Skin is pink and warm. SILT.   Lower extremity:  No calf tenderness, calves are soft. 2+pulses. NVI. (+)EHL/FHL/ADF/APF.  Good capillary refill.                02-10    137  |  100  |  17  ----------------------------<  105<H>  3.9   |  32<H>  |  0.85    Ca    8.5      10 Feb 2021 06:49                          9.7    10.39 )-----------( 186      ( 10 Feb 2021 06:49 )             31.1         Impression:  56 y/o Female S/p R Total Knee Replacement POD# 2  Plan:  -  Pain control  -  DVT prophylaxis  -  Daily Physical Therapy:  WBAT to RLE  -  Discharge planning: Home Vs. Rehab pending Physical therapy eval.  -  Heel bump to RLE  -  Incentive Spirometer  -  Appreciate Medical recommendations- hold BP meds and lasix   -  Case d/w Dr. Jones

## 2021-02-10 NOTE — CONSULT NOTE ADULT - PROBLEM SELECTOR RECOMMENDATION 3
-advanced OA, s/p R TKR on 2/8  -DVT ppx, pain control as per ortho team  Pain management on board
-advanced OA, s/p R TKR on 2/8  -DVT ppx, pain control   Pain management on board
-advanced OA, s/p R TKR on 2/8  -DVT ppx, pain control as per ortho team

## 2021-02-10 NOTE — CONSULT NOTE ADULT - PROBLEM SELECTOR RECOMMENDATION 2
ICM in patient s/p PCI of LAD,   EF from july 2020 noted 36%  would be cautious from administering fluids  currently euvolemic, right leg swelling 2/2 post operative swelling.  Resumed on BB, will also give one dose of Lasix 20 mg today.  -continue asa, statin
ICM in patient s/p PCI of LAD,   EF from july 2020 noted 36%  would be cautious from administering fluids  currently euvolemic  lasix 20mg bid and metoprolol succinate 25mg qd home dose held for now.  -continue asa, statin
ICM in patient s/p PCI of LAD,   EF from july 2020 noted 36%  would be cautious from administering fluids  currently euvolemic  hold off lasix 20mg bid and metoprolol succinate 25mg qd home dose for now until blood pressure improves  -continue asa, statin

## 2021-02-10 NOTE — CONSULT NOTE ADULT - PROBLEM SELECTOR RECOMMENDATION 4
not in exacerbation   continue inhalers

## 2021-02-10 NOTE — PROGRESS NOTE ADULT - SUBJECTIVE AND OBJECTIVE BOX
Source of information: , Chart review  Patient language: English  : n/a    CC:  right knee pain    This is a 55yFemale patient who presents to the hospital for Patient is a 55y old  Female who presents with a chief complaint of Right knee replacement (10 Feb 2021 08:16)    Pt s/p right TKR, pod#2.  + right knee pain.  Pain increases on exertion.  Pt is oob and ambulating around unit.  Pain severe upon ambulating.  + edema of right leg.  Lasix restarted.  Pt takes nucynta at home 100mg po 3x a day.  No available here.  Will trial oxycodone 15mg.  BB restarted - + tachycardia.     PAIN SCORE:     5/10    SCALE USED: (1-10 NRS)      PAST MEDICAL & SURGICAL HISTORY:  Lumbar herniated disc    Seasonal allergies    Primary osteoarthritis of right knee    HTN (hypertension)    HLD (hyperlipidemia)    CHF (congestive heart failure)    CAD (coronary artery disease)  S/P stent placement    Asthma    STEMI (ST elevation myocardial infarction)    Fibromyalgia    S/P nasal septoplasty    S/P D&amp;C (status post dilation and curettage)    S/P left knee arthroscopy    S/P right oophorectomy    S/P hysterectomy    S/P coronary artery stent placement  LAD on 3/2019        FAMILY HISTORY:  Family history of brain cancer  brother    Family history of COPD (chronic obstructive pulmonary disease)  mother    FH: coronary artery disease  mother          SOCIAL HISTORY:  [x ] Denies Smoking, Alcohol, or Drug Use    Allergies    grass / pollen/ trees/ dust/ cats/ dogs------ sneezing/ wheezing/ itchy eyes/ itchy skin (Other)  No Known Drug Allergies    Intolerances        MEDICATIONS:    MEDICATIONS  (STANDING):  aspirin enteric coated 81 milliGRAM(s) Oral daily  atorvastatin 40 milliGRAM(s) Oral at bedtime  budesonide 160 MICROgram(s)/formoterol 4.5 MICROgram(s) Inhaler 2 Puff(s) Inhalation two times a day  DULoxetine 60 milliGRAM(s) Oral daily  enoxaparin Injectable 30 milliGRAM(s) SubCutaneous every 12 hours  ferrous    sulfate 325 milliGRAM(s) Oral daily  furosemide    Tablet 20 milliGRAM(s) Oral once  metoprolol succinate ER 25 milliGRAM(s) Oral daily  montelukast 10 milliGRAM(s) Oral at bedtime  multivitamin 1 Tablet(s) Oral daily  pantoprazole    Tablet 40 milliGRAM(s) Oral before breakfast  polyethylene glycol 3350 17 Gram(s) Oral at bedtime  senna 2 Tablet(s) Oral at bedtime    MEDICATIONS  (PRN):  ALBUTerol    90 MICROgram(s) HFA Inhaler 2 Puff(s) Inhalation every 6 hours PRN Shortness of Breath and/or Wheezing  ondansetron Injectable 4 milliGRAM(s) IV Push every 6 hours PRN Nausea and/or Vomiting  oxyCODONE    IR 15 milliGRAM(s) Oral every 6 hours PRN Severe Pain (7 - 10)      Vital Signs Last 24 Hrs  T(C): 37.4 (10 Feb 2021 05:25), Max: 37.4 (09 Feb 2021 20:23)  T(F): 99.4 (10 Feb 2021 05:25), Max: 99.4 (10 Feb 2021 05:25)  HR: 109 (10 Feb 2021 05:25) (98 - 109)  BP: 116/76 (10 Feb 2021 05:25) (105/66 - 129/69)  BP(mean): --  RR: 18 (10 Feb 2021 05:25) (18 - 18)  SpO2: 100% (10 Feb 2021 05:25) (95% - 100%)    LABS:                          9.7    10.39 )-----------( 186      ( 10 Feb 2021 06:49 )             31.1     02-10    137  |  100  |  17  ----------------------------<  105<H>  3.9   |  32<H>  |  0.85    Ca    8.5      10 Feb 2021 06:49            CAPILLARY BLOOD GLUCOSE          Radiology:    Drug Screen:        REVIEW OF SYSTEMS:  CONSTITUTIONAL: No fever or fatigue  RESPIRATORY: No cough, wheezing, chills or hemoptysis; No shortness of breath  CARDIOVASCULAR: No chest pain, palpitations, dizziness, or leg swelling  GASTROINTESTINAL: No abdominal or epigastric pain. No nausea, vomiting; No diarrhea or constipation.   GENITOURINARY: No dysuria, frequency, hematuria, retention or incontinence  MUSCULOSKELETAL:  + right knee pain  NEURO: No headaches, No numbness/tingling b/l LE, No weakness  PSYCHIATRIC: No depression, anxiety, mood swings, or difficulty sleeping    PHYSICAL EXAM:  GENERAL:  Alert & Oriented X3, NAD, Good concentration  CHEST/LUNG: Clear to auscultation bilaterally; No rales, rhonchi, wheezing, or rubs  HEART: + tachycardia rate and rhythm; No murmurs, rubs, or gallops  ABDOMEN: Soft, Nontender, Nondistended; Bowel sounds present  EXTREMITIES:  2+ Peripheral Pulses, No cyanosis, or edema  MUSCULOSKELETAL: + decreased rom + right knee tenderness + edema of right leg  SKIN: No rashes or lesions    Risk factors associated with adverse outcomes related to opioid treatment  [ ]  Concurrent benzodiazepine use  [ ]  History/ Active substance use or alcohol use disorder  [ ] Psychiatric co-morbidity  [ ] Sleep apnea  [ ] COPD  [ ] BMI> 35  [ ] Liver dysfunction  [ ] Renal dysfunction  [ ] CHF  [ ] Smoker  [ x]  Age > 60 years    [x ]  [place]  Reviewed and Copied to Chart. See below.    Plan of care and goal oriented pain management treatment options were discussed with patient and /or primary care giver; all questions and concerns were addressed and care was aligned with patient's wishes.    Educated patient on goal oriented pain management treatment options     02-10-21 @ 12:07

## 2021-02-11 ENCOUNTER — TRANSCRIPTION ENCOUNTER (OUTPATIENT)
Age: 56
End: 2021-02-11

## 2021-02-11 VITALS
RESPIRATION RATE: 17 BRPM | HEART RATE: 106 BPM | TEMPERATURE: 98 F | SYSTOLIC BLOOD PRESSURE: 120 MMHG | DIASTOLIC BLOOD PRESSURE: 70 MMHG | OXYGEN SATURATION: 96 %

## 2021-02-11 LAB
ANION GAP SERPL CALC-SCNC: 3 MMOL/L — LOW (ref 5–17)
BUN SERPL-MCNC: 16 MG/DL — SIGNIFICANT CHANGE UP (ref 7–18)
CALCIUM SERPL-MCNC: 8.6 MG/DL — SIGNIFICANT CHANGE UP (ref 8.4–10.5)
CHLORIDE SERPL-SCNC: 97 MMOL/L — SIGNIFICANT CHANGE UP (ref 96–108)
CO2 SERPL-SCNC: 35 MMOL/L — HIGH (ref 22–31)
CREAT SERPL-MCNC: 0.71 MG/DL — SIGNIFICANT CHANGE UP (ref 0.5–1.3)
GLUCOSE SERPL-MCNC: 110 MG/DL — HIGH (ref 70–99)
HCT VFR BLD CALC: 26.6 % — LOW (ref 34.5–45)
HGB BLD-MCNC: 8.4 G/DL — LOW (ref 11.5–15.5)
MCHC RBC-ENTMCNC: 26 PG — LOW (ref 27–34)
MCHC RBC-ENTMCNC: 31.6 GM/DL — LOW (ref 32–36)
MCV RBC AUTO: 82.4 FL — SIGNIFICANT CHANGE UP (ref 80–100)
NRBC # BLD: 0 /100 WBCS — SIGNIFICANT CHANGE UP (ref 0–0)
PLATELET # BLD AUTO: 183 K/UL — SIGNIFICANT CHANGE UP (ref 150–400)
POTASSIUM SERPL-MCNC: 3.9 MMOL/L — SIGNIFICANT CHANGE UP (ref 3.5–5.3)
POTASSIUM SERPL-SCNC: 3.9 MMOL/L — SIGNIFICANT CHANGE UP (ref 3.5–5.3)
RBC # BLD: 3.23 M/UL — LOW (ref 3.8–5.2)
RBC # FLD: 14.9 % — HIGH (ref 10.3–14.5)
SODIUM SERPL-SCNC: 135 MMOL/L — SIGNIFICANT CHANGE UP (ref 135–145)
WBC # BLD: 12.45 K/UL — HIGH (ref 3.8–10.5)
WBC # FLD AUTO: 12.45 K/UL — HIGH (ref 3.8–10.5)

## 2021-02-11 PROCEDURE — 82553 CREATINE MB FRACTION: CPT

## 2021-02-11 PROCEDURE — 88305 TISSUE EXAM BY PATHOLOGIST: CPT

## 2021-02-11 PROCEDURE — 36415 COLL VENOUS BLD VENIPUNCTURE: CPT

## 2021-02-11 PROCEDURE — 82962 GLUCOSE BLOOD TEST: CPT

## 2021-02-11 PROCEDURE — C1713: CPT

## 2021-02-11 PROCEDURE — 86900 BLOOD TYPING SEROLOGIC ABO: CPT

## 2021-02-11 PROCEDURE — 97110 THERAPEUTIC EXERCISES: CPT

## 2021-02-11 PROCEDURE — 86850 RBC ANTIBODY SCREEN: CPT

## 2021-02-11 PROCEDURE — 82550 ASSAY OF CK (CPK): CPT

## 2021-02-11 PROCEDURE — 88311 DECALCIFY TISSUE: CPT

## 2021-02-11 PROCEDURE — 86901 BLOOD TYPING SEROLOGIC RH(D): CPT

## 2021-02-11 PROCEDURE — 84484 ASSAY OF TROPONIN QUANT: CPT

## 2021-02-11 PROCEDURE — C1889: CPT

## 2021-02-11 PROCEDURE — 80048 BASIC METABOLIC PNL TOTAL CA: CPT

## 2021-02-11 PROCEDURE — 86923 COMPATIBILITY TEST ELECTRIC: CPT

## 2021-02-11 PROCEDURE — C1776: CPT

## 2021-02-11 PROCEDURE — 36430 TRANSFUSION BLD/BLD COMPNT: CPT

## 2021-02-11 PROCEDURE — 97161 PT EVAL LOW COMPLEX 20 MIN: CPT

## 2021-02-11 PROCEDURE — P9040: CPT

## 2021-02-11 PROCEDURE — 93005 ELECTROCARDIOGRAM TRACING: CPT

## 2021-02-11 PROCEDURE — 94640 AIRWAY INHALATION TREATMENT: CPT

## 2021-02-11 PROCEDURE — 99232 SBSQ HOSP IP/OBS MODERATE 35: CPT

## 2021-02-11 PROCEDURE — 97530 THERAPEUTIC ACTIVITIES: CPT

## 2021-02-11 PROCEDURE — 85027 COMPLETE CBC AUTOMATED: CPT

## 2021-02-11 PROCEDURE — 99253 IP/OBS CNSLTJ NEW/EST LOW 45: CPT

## 2021-02-11 RX ORDER — SENNA PLUS 8.6 MG/1
1 TABLET ORAL
Qty: 15 | Refills: 0
Start: 2021-02-11 | End: 2021-02-25

## 2021-02-11 RX ORDER — FUROSEMIDE 40 MG
20 TABLET ORAL ONCE
Refills: 0 | Status: COMPLETED | OUTPATIENT
Start: 2021-02-11 | End: 2021-02-11

## 2021-02-11 RX ORDER — POTASSIUM CHLORIDE 20 MEQ
1 PACKET (EA) ORAL
Qty: 0 | Refills: 0 | DISCHARGE

## 2021-02-11 RX ORDER — POLYETHYLENE GLYCOL 3350 17 G/17G
17 POWDER, FOR SOLUTION ORAL
Qty: 5 | Refills: 0
Start: 2021-02-11

## 2021-02-11 RX ORDER — METOPROLOL TARTRATE 50 MG
25 TABLET ORAL ONCE
Refills: 0 | Status: COMPLETED | OUTPATIENT
Start: 2021-02-11 | End: 2021-02-11

## 2021-02-11 RX ORDER — ASPIRIN/CALCIUM CARB/MAGNESIUM 324 MG
1 TABLET ORAL
Qty: 0 | Refills: 0 | DISCHARGE
Start: 2021-02-11

## 2021-02-11 RX ORDER — ENOXAPARIN SODIUM 100 MG/ML
40 INJECTION SUBCUTANEOUS
Qty: 11 | Refills: 0
Start: 2021-02-11 | End: 2021-02-23

## 2021-02-11 RX ORDER — FERROUS SULFATE 325(65) MG
1 TABLET ORAL
Qty: 28 | Refills: 0
Start: 2021-02-11 | End: 2021-02-24

## 2021-02-11 RX ORDER — PANTOPRAZOLE SODIUM 20 MG/1
1 TABLET, DELAYED RELEASE ORAL
Qty: 14 | Refills: 0
Start: 2021-02-11 | End: 2021-02-24

## 2021-02-11 RX ORDER — METOPROLOL TARTRATE 50 MG
25 TABLET ORAL DAILY
Refills: 0 | Status: DISCONTINUED | OUTPATIENT
Start: 2021-02-11 | End: 2021-02-11

## 2021-02-11 RX ORDER — ESZOPICLONE 2 MG/1
1 TABLET, COATED ORAL
Qty: 0 | Refills: 0 | DISCHARGE

## 2021-02-11 RX ORDER — OXYCODONE HYDROCHLORIDE 5 MG/1
1 TABLET ORAL
Qty: 20 | Refills: 0
Start: 2021-02-11 | End: 2021-02-15

## 2021-02-11 RX ADMIN — DULOXETINE HYDROCHLORIDE 60 MILLIGRAM(S): 30 CAPSULE, DELAYED RELEASE ORAL at 11:44

## 2021-02-11 RX ADMIN — ENOXAPARIN SODIUM 30 MILLIGRAM(S): 100 INJECTION SUBCUTANEOUS at 17:12

## 2021-02-11 RX ADMIN — ENOXAPARIN SODIUM 30 MILLIGRAM(S): 100 INJECTION SUBCUTANEOUS at 05:35

## 2021-02-11 RX ADMIN — Medication 325 MILLIGRAM(S): at 11:44

## 2021-02-11 RX ADMIN — BUDESONIDE AND FORMOTEROL FUMARATE DIHYDRATE 2 PUFF(S): 160; 4.5 AEROSOL RESPIRATORY (INHALATION) at 09:36

## 2021-02-11 RX ADMIN — Medication 1 TABLET(S): at 11:44

## 2021-02-11 RX ADMIN — Medication 81 MILLIGRAM(S): at 11:44

## 2021-02-11 RX ADMIN — Medication 20 MILLIGRAM(S): at 13:49

## 2021-02-11 RX ADMIN — OXYCODONE HYDROCHLORIDE 15 MILLIGRAM(S): 5 TABLET ORAL at 09:43

## 2021-02-11 RX ADMIN — OXYCODONE HYDROCHLORIDE 15 MILLIGRAM(S): 5 TABLET ORAL at 00:58

## 2021-02-11 RX ADMIN — PANTOPRAZOLE SODIUM 40 MILLIGRAM(S): 20 TABLET, DELAYED RELEASE ORAL at 05:36

## 2021-02-11 RX ADMIN — Medication 25 MILLIGRAM(S): at 14:25

## 2021-02-11 NOTE — PROGRESS NOTE ADULT - SUBJECTIVE AND OBJECTIVE BOX
Source of information: , Chart review  Patient language: English  : n/a    CC:  right knee pain    This is a 55yFemale patient who presents to the hospital for Patient is a 55y old  Female who presents with a chief complaint of Right knee replacement (11 Feb 2021 08:53)    Pt s/p right TKR, pod#3. + right knee pain.  Pain increases on ambulation. + right leg swelling.  Pain ambulated in tellez.  No nausea or vomiting. No chest pain or sob.  h/h low - to get 1 unit.  HR still     PAIN SCORE:         SCALE USED: (1-10 NRS)      PAST MEDICAL & SURGICAL HISTORY:  Lumbar herniated disc    Seasonal allergies    Primary osteoarthritis of right knee    HTN (hypertension)    HLD (hyperlipidemia)    CHF (congestive heart failure)    CAD (coronary artery disease)  S/P stent placement    Asthma    STEMI (ST elevation myocardial infarction)    Fibromyalgia    S/P nasal septoplasty    S/P D&amp;C (status post dilation and curettage)    S/P left knee arthroscopy    S/P right oophorectomy    S/P hysterectomy    S/P coronary artery stent placement  LAD on 3/2019        FAMILY HISTORY:  Family history of brain cancer  brother    Family history of COPD (chronic obstructive pulmonary disease)  mother    FH: coronary artery disease  mother          SOCIAL HISTORY:  [ ] Denies Smoking, Alcohol, or Drug Use    Allergies    grass / pollen/ trees/ dust/ cats/ dogs------ sneezing/ wheezing/ itchy eyes/ itchy skin (Other)  No Known Drug Allergies    Intolerances        MEDICATIONS:    MEDICATIONS  (STANDING):  aspirin enteric coated 81 milliGRAM(s) Oral daily  atorvastatin 40 milliGRAM(s) Oral at bedtime  budesonide 160 MICROgram(s)/formoterol 4.5 MICROgram(s) Inhaler 2 Puff(s) Inhalation two times a day  DULoxetine 60 milliGRAM(s) Oral daily  enoxaparin Injectable 30 milliGRAM(s) SubCutaneous every 12 hours  ferrous    sulfate 325 milliGRAM(s) Oral daily  furosemide    Tablet 20 milliGRAM(s) Oral every 12 hours  metoprolol succinate ER 25 milliGRAM(s) Oral daily  montelukast 10 milliGRAM(s) Oral at bedtime  multivitamin 1 Tablet(s) Oral daily  pantoprazole    Tablet 40 milliGRAM(s) Oral before breakfast  polyethylene glycol 3350 17 Gram(s) Oral at bedtime  senna 2 Tablet(s) Oral at bedtime    MEDICATIONS  (PRN):  ALBUTerol    90 MICROgram(s) HFA Inhaler 2 Puff(s) Inhalation every 6 hours PRN Shortness of Breath and/or Wheezing  ondansetron Injectable 4 milliGRAM(s) IV Push every 6 hours PRN Nausea and/or Vomiting  oxyCODONE    IR 15 milliGRAM(s) Oral every 6 hours PRN Severe Pain (7 - 10)      Vital Signs Last 24 Hrs  T(C): 36.7 (11 Feb 2021 11:21), Max: 37.1 (11 Feb 2021 06:00)  T(F): 98 (11 Feb 2021 11:21), Max: 98.7 (11 Feb 2021 06:00)  HR: 111 (11 Feb 2021 11:21) (98 - 115)  BP: 104/66 (11 Feb 2021 11:21) (103/67 - 114/64)  BP(mean): --  RR: 18 (11 Feb 2021 11:21) (17 - 18)  SpO2: 100% (11 Feb 2021 11:21) (95% - 100%)    LABS:                          8.4    12.45 )-----------( 183      ( 11 Feb 2021 06:39 )             26.6     02-11    135  |  97  |  16  ----------------------------<  110<H>  3.9   |  35<H>  |  0.71    Ca    8.6      11 Feb 2021 06:39            CAPILLARY BLOOD GLUCOSE          Radiology:    Drug Screen:        REVIEW OF SYSTEMS:  CONSTITUTIONAL: No fever or fatigue  RESPIRATORY: No cough, wheezing, chills or hemoptysis; No shortness of breath  CARDIOVASCULAR: No chest pain, palpitations, dizziness, or leg swelling  GASTROINTESTINAL: No abdominal or epigastric pain. No nausea, vomiting; No diarrhea or constipation.   GENITOURINARY: No dysuria, frequency, hematuria, retention or incontinence  MUSCULOSKELETAL: No joint pain or swelling; No muscle, back, or extremity pain, no upper or lower motor strength weakness, no saddle anesthesia, bowel/bladder incontinence, no falls   NEURO: No headaches, No numbness/tingling b/l LE, No weakness  PSYCHIATRIC: No depression, anxiety, mood swings, or difficulty sleeping    PHYSICAL EXAM:  GENERAL:  Alert & Oriented X3, NAD, Good concentration  CHEST/LUNG: Clear to auscultation bilaterally; No rales, rhonchi, wheezing, or rubs  HEART: Regular rate and rhythm; No murmurs, rubs, or gallops  ABDOMEN: Soft, Nontender, Nondistended; Bowel sounds present  EXTREMITIES:  2+ Peripheral Pulses, No cyanosis, or edema  MUSCULOSKELETAL: + decreased rom Motor Strength 5/5 B/L upper and lower extremities; moves all extremities equally against gravity; ROM intact; negative SLR  SKIN: No rashes or lesions    Risk factors associated with adverse outcomes related to opioid treatment  [ ]  Concurrent benzodiazepine use  [ ]  History/ Active substance use or alcohol use disorder  [ ] Psychiatric co-morbidity  [ ] Sleep apnea  [ ] COPD  [ ] BMI> 35  [ ] Liver dysfunction  [ ] Renal dysfunction  [ ] CHF  [ ] Smoker  [ ]  Age > 60 years    [ ]  [place]  Reviewed and Copied to Chart. See below.    Plan of care and goal oriented pain management treatment options were discussed with patient and /or primary care giver; all questions and concerns were addressed and care was aligned with patient's wishes.    Educated patient on goal oriented pain management treatment options     02-11-21 @ 12:26 Source of information: , Chart review  Patient language: English  : n/a    CC:  right knee pain    This is a 55yFemale patient who presents to the hospital for Patient is a 55y old  Female who presents with a chief complaint of Right knee replacement (11 Feb 2021 08:53)    Pt s/p right TKR, pod#3. + right knee pain.  Pain increases on ambulation. + right leg swelling.  Pain ambulated in tellez.  No nausea or vomiting. No chest pain or sob.  h/h low - to get 1 unit.  HR still elevated.  Pt did not receive bb this morning or lasix.     PAIN SCORE:    5/10     SCALE USED: (1-10 NRS)      PAST MEDICAL & SURGICAL HISTORY:  Lumbar herniated disc    Seasonal allergies    Primary osteoarthritis of right knee    HTN (hypertension)    HLD (hyperlipidemia)    CHF (congestive heart failure)    CAD (coronary artery disease)  S/P stent placement    Asthma    STEMI (ST elevation myocardial infarction)    Fibromyalgia    S/P nasal septoplasty    S/P D&amp;C (status post dilation and curettage)    S/P left knee arthroscopy    S/P right oophorectomy    S/P hysterectomy    S/P coronary artery stent placement  LAD on 3/2019        FAMILY HISTORY:  Family history of brain cancer  brother    Family history of COPD (chronic obstructive pulmonary disease)  mother    FH: coronary artery disease  mother          SOCIAL HISTORY:  [x ] Denies Smoking, Alcohol, or Drug Use    Allergies    grass / pollen/ trees/ dust/ cats/ dogs------ sneezing/ wheezing/ itchy eyes/ itchy skin (Other)  No Known Drug Allergies    Intolerances        MEDICATIONS:    MEDICATIONS  (STANDING):  aspirin enteric coated 81 milliGRAM(s) Oral daily  atorvastatin 40 milliGRAM(s) Oral at bedtime  budesonide 160 MICROgram(s)/formoterol 4.5 MICROgram(s) Inhaler 2 Puff(s) Inhalation two times a day  DULoxetine 60 milliGRAM(s) Oral daily  enoxaparin Injectable 30 milliGRAM(s) SubCutaneous every 12 hours  ferrous    sulfate 325 milliGRAM(s) Oral daily  furosemide    Tablet 20 milliGRAM(s) Oral every 12 hours  metoprolol succinate ER 25 milliGRAM(s) Oral daily  montelukast 10 milliGRAM(s) Oral at bedtime  multivitamin 1 Tablet(s) Oral daily  pantoprazole    Tablet 40 milliGRAM(s) Oral before breakfast  polyethylene glycol 3350 17 Gram(s) Oral at bedtime  senna 2 Tablet(s) Oral at bedtime    MEDICATIONS  (PRN):  ALBUTerol    90 MICROgram(s) HFA Inhaler 2 Puff(s) Inhalation every 6 hours PRN Shortness of Breath and/or Wheezing  ondansetron Injectable 4 milliGRAM(s) IV Push every 6 hours PRN Nausea and/or Vomiting  oxyCODONE    IR 15 milliGRAM(s) Oral every 6 hours PRN Severe Pain (7 - 10)      Vital Signs Last 24 Hrs  T(C): 36.7 (11 Feb 2021 11:21), Max: 37.1 (11 Feb 2021 06:00)  T(F): 98 (11 Feb 2021 11:21), Max: 98.7 (11 Feb 2021 06:00)  HR: 111 (11 Feb 2021 11:21) (98 - 115)  BP: 104/66 (11 Feb 2021 11:21) (103/67 - 114/64)  BP(mean): --  RR: 18 (11 Feb 2021 11:21) (17 - 18)  SpO2: 100% (11 Feb 2021 11:21) (95% - 100%)    LABS:                          8.4    12.45 )-----------( 183      ( 11 Feb 2021 06:39 )             26.6     02-11    135  |  97  |  16  ----------------------------<  110<H>  3.9   |  35<H>  |  0.71    Ca    8.6      11 Feb 2021 06:39            CAPILLARY BLOOD GLUCOSE          Radiology:    Drug Screen:        REVIEW OF SYSTEMS:  CONSTITUTIONAL: No fever or fatigue  RESPIRATORY: No cough, wheezing, chills or hemoptysis; No shortness of breath  CARDIOVASCULAR: No chest pain, palpitations, dizziness, or leg swelling  GASTROINTESTINAL: No abdominal or epigastric pain. No nausea, vomiting; No diarrhea or constipation.   GENITOURINARY: No dysuria, frequency, hematuria, retention or incontinence  MUSCULOSKELETAL: + right knee pain  NEURO: No headaches, No numbness/tingling b/l LE, No weakness  PSYCHIATRIC: No depression, anxiety, mood swings, or difficulty sleeping    PHYSICAL EXAM:  GENERAL:  Alert & Oriented X3, NAD, Good concentration  CHEST/LUNG: Clear to auscultation bilaterally; No rales, rhonchi, wheezing, or rubs  HEART: Regular rate and rhythm; No murmurs, rubs, or gallops  ABDOMEN: Soft, Nontender, Nondistended; Bowel sounds present  EXTREMITIES:  2+ Peripheral Pulses, No cyanosis, or edema  MUSCULOSKELETAL: + decreased rom + right knee tenderness + right leg edema  SKIN: No rashes or lesions    Risk factors associated with adverse outcomes related to opioid treatment  [ ]  Concurrent benzodiazepine use  [ ]  History/ Active substance use or alcohol use disorder  [ ] Psychiatric co-morbidity  [ ] Sleep apnea  [ ] COPD  [ ] BMI> 35  [ ] Liver dysfunction  [ ] Renal dysfunction  [ ] CHF  [ ] Smoker  [x ]  Age > 60 years    [ x]  [place]  Reviewed and Copied to Chart. See below.    Plan of care and goal oriented pain management treatment options were discussed with patient and /or primary care giver; all questions and concerns were addressed and care was aligned with patient's wishes.    Educated patient on goal oriented pain management treatment options     02-11-21 @ 12:26

## 2021-02-11 NOTE — CONSULT NOTE ADULT - SUBJECTIVE AND OBJECTIVE BOX
Patient is a 55y old  Female who presents with a chief complaint of S/p Right total knee replacement on 2/8/8021 (08 Feb 2021 15:29)    HPI: Patient is a 56yo F with HTN, HLD, CAD (s/p PCI of LAD), CHF (EF of 36% in 7/2020), OA, fibromyalgia, presented to amb-surg for R TKR. Post-op patient noted to be hypotensive to sbp of 90s hence medicine consult requested. Patient assessed bedside, sitting comfortably, reports she was given spinal anesthesia for the procedure. was feeling lightheaded with nausea immediately post-op however currently asymptomatic. Denies chest pain, palpitations, shortness of breath, swelling of lower extremities. Home meds include lasix 20mg bid and metoprolol 25mg qd for her chf /htn. Anesthesia record from OR reviewed: patient received versed 2mg, marcaine 2mg, fentanyl iv 25mg, 25mg, 25mg, 15mg as well as intrathecal for anesthesia in addition to metoprolol. Also received phenylephrine and propofol in OR. Total fluids received: 700cc. Last blood pressure reading on floor noted 94/60.   Patient home medication Lasix po and Metoprolol were held. Patient reports lightheadedness has improved. C/o right knee pain at surgical site. BP improved,      Lasix and Metoprolol was resumed, but patient did not receive lasix and BB this morning. Patient stays tachycardiac -115/min. Advised to give dose of BB.     REVIEW OF SYSTEMS:  CONSTITUTIONAL: No fever, weight loss, or fatigue  EYES: No eye pain, visual disturbances, or discharge  ENMT:  No difficulty hearing, tinnitus, vertigo; No sinus or throat pain  NECK: No pain or stiffness  BREASTS: No pain, masses, or nipple discharge  RESPIRATORY: No cough, wheezing, chills or hemoptysis; No shortness of breath  CARDIOVASCULAR: No chest pain, palpitations, dizziness, or leg swelling  GASTROINTESTINAL: No abdominal or epigastric pain. No nausea, vomiting, or hematemesis; No diarrhea or constipation. No melena or hematochezia.  GENITOURINARY: No dysuria, frequency, hematuria, or incontinence  NEUROLOGICAL: No headaches, memory loss, loss of strength, numbness, or tremors  SKIN: No itching, burning, rashes, or lesions   LYMPH NODES: No enlarged glands  ENDOCRINE: No heat or cold intolerance; No hair loss  MUSCULOSKELETAL: Knee pain +; No muscle, back, or extremity pain  PSYCHIATRIC: No depression, anxiety, mood swings, or difficulty sleeping  HEME/LYMPH: No easy bruising, or bleeding gums  ALLERY AND IMMUNOLOGIC: No hives or eczema    Vital Signs Last 24 Hrs  T(C): 36.7 (10 Feb 2021 14:29), Max: 37.4 (09 Feb 2021 20:23)  T(F): 98 (10 Feb 2021 14:29), Max: 99.4 (10 Feb 2021 05:25)  HR: 104 (10 Feb 2021 14:29) (102 - 130)  BP: 103/67 (10 Feb 2021 14:29) (103/67 - 129/69)  BP(mean): --  RR: 18 (10 Feb 2021 14:29) (18 - 18)  SpO2: 95% (10 Feb 2021 14:29) (95% - 100%)    PHYSICAL EXAM:  GENERAL: NAD, well-groomed, well-developed  HEAD:  Atraumatic, Normocephalic  EYES: EOMI, PERRLA, conjunctiva and sclera clear  ENMT: No tonsillar erythema, exudates, or enlargement; Moist mucous membranes, Good dentition, No lesions  NECK: Supple, No JVD, Normal thyroid  NERVOUS SYSTEM:  Alert & Oriented X3, Good concentration; Motor Strength 5/5 B/L upper and lower extremities (limited ROM of R knee); DTRs 2+ intact and symmetric. surgical dressing over right knee in place  CHEST/LUNG: Clear to auscultation bilaterally; No rales, rhonchi, wheezing, or rubs  HEART: Regular rate and rhythm; No murmurs, rubs, or gallops  ABDOMEN: Soft, Nontender, Nondistended; Bowel sounds present  EXTREMITIES:  2+ Peripheral Pulses, No clubbing, cyanosis, or edema; R knee in ace wrap  LYMPH: No lymphadenopathy noted  SKIN: No rashes or lesions    Consultant(s) Notes Reviewed:  [x ] YES  [ ] NO  Care Discussed with Consultants/Other Providers [ x] YES  [ ] NO    LABS:                                               9.7    10.39 )-----------( 186      ( 10 Feb 2021 06:49 )             31.1       02-10    137  |  100  |  17  ----------------------------<  105<H>  3.9   |  32<H>  |  0.85    Ca    8.5      10 Feb 2021 06:49        RADIOLOGY & ADDITIONAL TESTS:  < from: Xray Chest 2 Views PA/Lat (02.04.21 @ 19:09) >  EXAM:  XR CHEST PA LAT 2V                            PROCEDURE DATE:  02/04/2021          INTERPRETATION:  CLINICAL INDICATION: 55 years  Female with Shortness of Breath.    COMPARISON: 10/15/2019    PA and lateral views of the chest demonstrate the lungs to be clear. There is no pleural effusion. There is no pneumothorax.    The heart is normal in size. There is no mediastinal or hilar mass.  The pulmonary vasculature is normal.    Mild thoracic degenerative changes are present.    IMPRESSION:    No acute infiltrate.        Imaging Personally Reviewed:  [ x ] YES  [ ] NO

## 2021-02-11 NOTE — CONSULT NOTE ADULT - ASSESSMENT
54yo F with HTN, HLD, CAD (s/p PCI of LAD), CHF (EF of 36% in 7/2020), OA, fibromyalgia, presented to amb-surg for R TKR. Noted to be hypotensive to sbp of 90s post-op hence medicine consult requested.
Data Detail Level: Printer-Friendly View Extended View  Confidential Drug Utilization Report  Search Terms: jojo mcnamara, 1965Search Date: 02/08/2021 16:38:51 PM  The Drug Utilization Report below displays all of the controlled substance prescriptions, if any, that your patient has filled in the last twelve months. The information displayed on this report is compiled from pharmacy submissions to the Department, and accurately reflects the information as submitted by the pharmacies.    This report was requested by: Sandra Miller | Reference #: [id number]    Others' Prescriptions  Patient Name: Jojo McnamaraBirth Date: 1965  Address: [address] [address]Mjk: Female  Rx Written	Rx Dispensed	Drug	Quantity	Days Supply	Prescriber Name	Payment Method	Dispenser  12/18/2020	01/28/2021	eszopiclone 3 mg tablet	30	30	AlmEarlene ventura MD	Insurance	[facility] #[id number]  12/18/2020	12/30/2020	eszopiclone 3 mg tablet	30	30	AlmEarlene ventura MD	Insurance	[facility] #[id number]  12/15/2020	12/16/2020	nucynta 100 mg tablet	90	30	Zitser, Gene	Insurance	[facility] #[id number]  07/31/2020	11/28/2020	eszopiclone 3 mg tablet	30	30	AlmEarlene ventura MD	Insurance	[facility] #[id number]  07/31/2020	10/27/2020	eszopiclone 3 mg tablet	30	30	AlmEarlene ventura MD	Insurance	[facility] #[id number]  07/31/2020	09/28/2020	eszopiclone 3 mg tablet	30	30	AlmendEarlene petersen MD	Insurance	[facility] #[id number]  09/02/2020	09/06/2020	nucynta 100 mg tablet	90	30	Zitser, Gene	Insurance	[facility] #[id number]  07/31/2020	08/29/2020	eszopiclone 3 mg tablet	30	30	AlmEarlene ventura MD	Insurance	[facility] #[id number]  07/01/2020	07/31/2020	eszopiclone 3 mg tablet	30	30	AlmEarlene ventura MD	Insurance	[facility] #[id number]  07/01/2020	07/01/2020	eszopiclone 3 mg tablet	30	30	Earlene Asher MD	Insurance	[facility] #[id number]  06/17/2020	06/19/2020	nucynta 100 mg tablet	81	27	Zitser, Gene	Insurance	[facility] #[id number]  05/30/2020	06/01/2020	eszopiclone 3 mg tablet	30	30	AlmEarlene ventura MD	Insurance	[facility] #[id number]  04/29/2020	05/02/2020	eszopiclone 2 mg tablet	30	30	AlmendEarlene petersen MD	Insurance	[facility] #[id number]  01/02/2020	03/30/2020	zolpidem tartrate 10 mg tablet	30	30	Umer Melo Jr, MD	Insurance	[facility] #[id number]  03/09/2020	03/30/2020	nucynta 100 mg tablet	90	30	Zitsjulián, Gene	Insurance	[id number]  01/02/2020	03/05/2020	zolpidem tartrate 10 mg tablet	30	30	Umer Melo Jr, MD	Insurance	[facility] #[id number]  * - Drugs marked with an asterisk are compound drugs. If the compound drug is made up of more than one controlled substance, then each controlled
54yo F with HTN, HLD, CAD (s/p PCI of LAD), CHF (EF of 36% in 7/2020), OA, fibromyalgia, presented to amb-surg for R TKR. Noted to be hypotensive to sbp of 90s post-op hence medicine consult requested.
56yo F with HTN, HLD, CAD (s/p PCI of LAD), CHF (EF of 36% in 7/2020), OA, fibromyalgia, presented to amb-surg for R TKR. Noted to be hypotensive to sbp of 90s post-op hence medicine consult requested.         Hypotension: resolved; was noted to be Hypotensive s/p OR, received fentanyl, versed, metoprolol, labetalol  -EKG was done noted NSR with no acute changes, cardiac enzymes negative  -Patient currently asymptomatic,   -Likely cause for hypotension being iatrogenic in setting of administration of anesthesia and antihypertensives  -metoprolol and Lasix were held. advised to be resumed.    Sinus tachycardia: likely rebound tachycardia from holding off BB.  - BP is improved, but patient is tachycardiac today likely rebound tachycardia from stopping BB.  - Lasix and Metoprolol were resumed, but patient did not receive lasix and BB this morning. Patient stays tachycardiac -115/min. Advised to give dose of Metoprolol ER 25 mg.  - Wells score 3, intermediate risk for PE. will hold off CTA.      CHF (congestive heart failure).  Recommendation: ICM in patient s/p PCI of LAD,   EF from july 2020 noted 36%  would be cautious from administering fluids  currently euvolemic, right leg swelling 2/2 post operative swelling.  Resumed on BB, will also give one dose of Lasix 20 mg today.  -continue asa, statin.      S/P total knee replacement, right.  Recommendation: -advanced OA, s/p R TKR on 2/8  -DVT ppx, pain control   Pain management on board.      Asthma.  Recommendation: not in exacerbation   continue inhalers.     Fibromyalgia.  Recommendation: continue duloxetine.     Patient is okay to discharge home from medical stand point.          
56yo F with HTN, HLD, CAD (s/p PCI of LAD), CHF (EF of 36% in 7/2020), OA, fibromyalgia, presented to amb-surg for R TKR. Noted to be hypotensive to sbp of 90s post-op hence medicine consult requested.

## 2021-02-11 NOTE — PROGRESS NOTE ADULT - PROBLEM SELECTOR PLAN 1
s/p right TKR   POD#.  PT with hypotension yesterday.  CHF -  low EF 36%.  Pt with chronic back pain. On Nucynta at home.  Non-formulary here.  Will trial oxycodone po prn here.  Caution use of fluids post operatively as pt has history of chf.   Routine Meds  - Acetaminophen 1 gram po q 6 hours atc for 24 hours  - dc celebrex  - dc gabapentin  - dc tramadol  Mild Pain (Pain Level 1-3)  - oxycodone 5mg po q 4 hours prn  Moderate Pain (Pain Level 4-6)  - Oxycodone 10mg po q 4 hours prn   Severe Pain ( Pain Level - 7-10)  - Toradol 30mg IVP q 6 hours prn   Bowel Regimen   - Senna and Miralax daily  OOB/PT  Plan discussed with patient and staff.
s/p right TKR   POD#3.  PT with hypotension yesterday.  CHF -  low EF 36%.  Pt with chronic back pain. On Nucynta at home.  Non-formulary here.  Will trial oxycodone po prn here.  Caution use of fluids post operatively as pt has history of chf. To receive 1 unit of blood today.    Routine Meds  - dc celebrex  - dc gabapentin  - dc tramadol  Mild Pain (Pain Level 1-3)  Moderate Pain (Pain Level 4-6)  Severe Pain ( Pain Level - 7-10)  - dc toradol.  trial oxycodone 15mg po q 6 hours prn  Bowel Regimen   - Senna and Miralax daily  OOB/PT  Plan discussed with patient and staff. Pt is on nucynta at home.  will determine if pt will also go home on oxycodone.
s/p right TKR   POD#2.  PT with hypotension yesterday.  CHF -  low EF 36%.  Pt with chronic back pain. On Nucynta at home.  Non-formulary here.  Will trial oxycodone po prn here.  Caution use of fluids post operatively as pt has history of chf.   Routine Meds  - dc celebrex  - dc gabapentin  - dc tramadol  Mild Pain (Pain Level 1-3)  Moderate Pain (Pain Level 4-6)  Severe Pain ( Pain Level - 7-10)  - dc toradol.  trial oxycodone 15mg po q 6 hours prn  Bowel Regimen   - Senna and Miralax daily  OOB/PT  Plan discussed with patient and staff. Pt is on nucynta at home.  will determine if pt will also go home on bt oxycodone.

## 2021-02-11 NOTE — PROGRESS NOTE ADULT - PROBLEM SELECTOR PROBLEM 2
S/P total knee replacement, right

## 2021-02-11 NOTE — PROGRESS NOTE ADULT - ASSESSMENT
54 y/o Female S/p R Total Knee Replacement POD# 1
Data Detail Level: Printer-Friendly View Extended View  Confidential Drug Utilization Report  Search Terms: jojo mcnamara, 1965Search Date: 02/08/2021 16:38:51 PM  The Drug Utilization Report below displays all of the controlled substance prescriptions, if any, that your patient has filled in the last twelve months. The information displayed on this report is compiled from pharmacy submissions to the Department, and accurately reflects the information as submitted by the pharmacies.    This report was requested by: Sandra Miller | Reference #: [id number]    Others' Prescriptions  Patient Name: Jojo McnamaraBirth Date: 1965  [address] [address]Bip: Female  Rx Written	Rx Dispensed	Drug	Quantity	Days Supply	Prescriber Name	Payment Method	Dispenser  12/18/2020	01/28/2021	eszopiclone 3 mg tablet	30	30	AlmEarlene ventura MD	Insurance	[facility] #[id number]  12/18/2020	12/30/2020	eszopiclone 3 mg tablet	30	30	AlmEarlene ventura MD	Insurance	[facility] #[id number]  12/15/2020	12/16/2020	nucynta 100 mg tablet	90	30	Zitser, Gene	Insurance	[facility] #[id number]  07/31/2020	11/28/2020	eszopiclone 3 mg tablet	30	30	AlmEarlene ventura MD	Insurance	[facility] #[id number]  07/31/2020	10/27/2020	eszopiclone 3 mg tablet	30	30	AlmEarlene ventura MD	Insurance	[facility] #[id number]  07/31/2020	09/28/2020	eszopiclone 3 mg tablet	30	30	AlmendEarlene petersen MD	Insurance	[facility] #[id number]  09/02/2020	09/06/2020	nucynta 100 mg tablet	90	30	Zitser, Gene	Insurance	[facility] #[id number]  07/31/2020	08/29/2020	eszopiclone 3 mg tablet	30	30	AlmEarlene ventura MD	Insurance	[facility] #[id number]  07/01/2020	07/31/2020	eszopiclone 3 mg tablet	30	30	AlmEarlene ventura MD	Insurance	[facility] #[id number]  07/01/2020	07/01/2020	eszopiclone 3 mg tablet	30	30	Earlene Asher MD	Insurance	[facility] #[id number]  06/17/2020	06/19/2020	nucynta 100 mg tablet	81	27	Zitser, Gene	Insurance	[facility] #[id number]  05/30/2020	06/01/2020	eszopiclone 3 mg tablet	30	30	AlmEarlene ventura MD	Insurance	[facility] #[id number]  04/29/2020	05/02/2020	eszopiclone 2 mg tablet	30	30	AlmendEarlene petersen MD	Insurance	[facility] #[id number]  01/02/2020	03/30/2020	zolpidem tartrate 10 mg tablet	30	30	Umer Melo Jr, MD	Insurance	[facility] #[id number]  03/09/2020	03/30/2020	nucynta 100 mg tablet	90	30	Zitsjulián, Gene	[id number]  01/02/2020	03/05/2020	zolpidem tartrate 10 mg tablet	30	30	Umer Melo Jr, MD	Insurance	[facility] #[id number]  * - Drugs marked with an asterisk are compound drugs. If the compound drug is made up of more than one controlled substance, then each controlled
Data Detail Level: Printer-Friendly View Extended View  Confidential Drug Utilization Report  Search Terms: jojo mcnamara, 1965Search Date: 02/08/2021 16:38:51 PM  The Drug Utilization Report below displays all of the controlled substance prescriptions, if any, that your patient has filled in the last twelve months. The information displayed on this report is compiled from pharmacy submissions to the Department, and accurately reflects the information as submitted by the pharmacies.    This report was requested by: Sandra Miller | Reference #: [id number]    Others' Prescriptions  Patient Name: Jojo McnamaraBirth Date: 1965  Address: [address] [address]Cdc: Female  Rx Written	Rx Dispensed	Drug	Quantity	Days Supply	Prescriber Name	Payment Method	Dispenser  12/18/2020	01/28/2021	eszopiclone 3 mg tablet	30	30	AlmEarlene ventura MD	Insurance	[facility] #[id number]  12/18/2020	12/30/2020	eszopiclone 3 mg tablet	30	30	AlmEarlene ventura MD	Insurance	[facility] #[id number]  12/15/2020	12/16/2020	nucynta 100 mg tablet	90	30	Zitser, Gene	Insurance	[facility] #[id number]  07/31/2020	11/28/2020	eszopiclone 3 mg tablet	30	30	AlmEarlene ventura MD	Insurance	[facility] #[id number]  07/31/2020	10/27/2020	eszopiclone 3 mg tablet	30	30	AlmEarlene ventura MD	Insurance	[facility] #[id number]  07/31/2020	09/28/2020	eszopiclone 3 mg tablet	30	30	AlmendEarlene petersen MD	Insurance	[facility] #[id number]  09/02/2020	09/06/2020	nucynta 100 mg tablet	90	30	Zitser, Gene	Insurance	[facility] #[id number]  07/31/2020	08/29/2020	eszopiclone 3 mg tablet	30	30	AlmEarlene ventura MD	Insurance	[facility] #[id number]  07/01/2020	07/31/2020	eszopiclone 3 mg tablet	30	30	AlmEarlene ventura MD	Insurance	[facility] #[id number]  07/01/2020	07/01/2020	eszopiclone 3 mg tablet	30	30	Earlene Asher MD	Insurance	[facility] #[id number]  06/17/2020	06/19/2020	nucynta 100 mg tablet	81	27	Zitser, Gene	Insurance	[facility] #[id number]  05/30/2020	06/01/2020	eszopiclone 3 mg tablet	30	30	AlmEarlene ventura MD	Insurance	[facility] #[id number]  04/29/2020	05/02/2020	eszopiclone 2 mg tablet	30	30	AlmendEarlene petersen MD	Insurance	[facility] #[id number]  01/02/2020	03/30/2020	zolpidem tartrate 10 mg tablet	30	30	Umer Melo Jr, MD	Insurance	[facility] #[id number]  03/09/2020	03/30/2020	nucynta 100 mg tablet	90	30	Zitsjulián, Gene	Ins[id number]  01/02/2020	03/05/2020	zolpidem tartrate 10 mg tablet	30	30	Umer Melo Jr, MD	Insurance	[facility] #[id number]  * - Drugs marked with an asterisk are compound drugs. If the compound drug is made up of more than one controlled substance, then each controlled
56 y/o Female S/p R Total Knee Replacement POD# 1
56 y/o Female S/p R Total Knee Replacement POD# 1
Data Detail Level: Printer-Friendly View Extended View  Confidential Drug Utilization Report  Search Terms: jojo mcnamara, 1965Search Date: 02/08/2021 16:38:51 PM  The Drug Utilization Report below displays all of the controlled substance prescriptions, if any, that your patient has filled in the last twelve months. The information displayed on this report is compiled from pharmacy submissions to the Department, and accurately reflects the information as submitted by the pharmacies.    This report was requested by: Sandra Miller | Reference #: [id number]    Others' Prescriptions  Patient Name: Jojo McnamaraBirth Date: 1965  Address: [address] [address]Oww: Female  Rx Written	Rx Dispensed	Drug	Quantity	Days Supply	Prescriber Name	Payment Method	Dispenser  12/18/2020	01/28/2021	eszopiclone 3 mg tablet	30	30	AlmEarlene ventura MD	Insurance	[facility] #[id number]  12/18/2020	12/30/2020	eszopiclone 3 mg tablet	30	30	AlmEarlene ventura MD	Insurance	[facility] #[id number]  12/15/2020	12/16/2020	nucynta 100 mg tablet	90	30	Zitser, Gene	Insurance	[facility] #[id number]  07/31/2020	11/28/2020	eszopiclone 3 mg tablet	30	30	AlmEarlene ventura MD	Insurance	[facility] #[id number]  07/31/2020	10/27/2020	eszopiclone 3 mg tablet	30	30	AlmEarlene ventura MD	Insurance	[facility] #[id number]  07/31/2020	09/28/2020	eszopiclone 3 mg tablet	30	30	AlmendEarlene petersen MD	Insurance	[facility] #[id number]  09/02/2020	09/06/2020	nucynta 100 mg tablet	90	30	Zitser, Gene	Insurance	[facility] #[id number]  07/31/2020	08/29/2020	eszopiclone 3 mg tablet	30	30	AlmEarlene ventura MD	Insurance	[facility] #[id number]  07/01/2020	07/31/2020	eszopiclone 3 mg tablet	30	30	AlmEarlene ventura MD	Insurance	[facility] #[id number]  07/01/2020	07/01/2020	eszopiclone 3 mg tablet	30	30	aErlene Asher MD	Insurance	[facility] #[id number]  06/17/2020	06/19/2020	nucynta 100 mg tablet	81	27	Zitser, Gene	Insurance	[facility] #[id number]  05/30/2020	06/01/2020	eszopiclone 3 mg tablet	30	30	AlmEarlene ventura MD	Insurance	[facility] #[id number]  04/29/2020	05/02/2020	eszopiclone 2 mg tablet	30	30	AlmendEarlene petersen MD	Insurance	[facility] #[id number]  01/02/2020	03/30/2020	zolpidem tartrate 10 mg tablet	30	30	Umer Melo Jr, MD	Insurance	[facility] #[id number]  03/09/2020	03/30/2020	nucynta 100 mg tablet	90	30	Zitsjulián, Gene	Insurance	[id number]  01/02/2020	03/05/2020	zolpidem tartrate 10 mg tablet	30	30	Umer Melo Jr, MD	Insurance	[facility] #[id number]  * - Drugs marked with an asterisk are compound drugs. If the compound drug is made up of more than one controlled substance, then each controlled

## 2021-02-11 NOTE — PROGRESS NOTE ADULT - SUBJECTIVE AND OBJECTIVE BOX
55yFemale    Diagnosis:  S/p R Total Knee Replacement POD# 3    Patient was seen and evaluated at bedside. Pt was admitted 2/9/21 due to hypotension- Pt states she feels "better today"- Blood pressure stabilized overnight.  Patient is back on her meds for blood pressure and rate control. Patient still feels dizzy when she tries to stand up and move around. Patient with no acute complaints.  Pain is controlled to the RLE. PT was seen by physical therapy and has been able to stand and walk with walker.   Denies CP/SOB, dyspnea, paresthesias, N/V/D, palpitations.       Vital Signs Last 24 Hrs  T(C): 37.1 (11 Feb 2021 06:00), Max: 37.1 (11 Feb 2021 06:00)  T(F): 98.7 (11 Feb 2021 06:00), Max: 98.7 (11 Feb 2021 06:00)  HR: 98 (11 Feb 2021 06:00) (98 - 130)  BP: 112/54 (11 Feb 2021 06:00) (103/67 - 126/60)  BP(mean): --  RR: 18 (11 Feb 2021 06:00) (17 - 18)  SpO2: 100% (11 Feb 2021 06:00) (95% - 100%)  Physical Exam:    General: AAOx3, NAD, resting comfortably in bed.    R KNEE:  Dressing is C/D/I - No active drainage, skin edges well approximated, No erythema, No effusion. Skin is pink and warm. SILT.   Lower extremity:  No calf tenderness, calves are soft. 2+pulses. NVI. (+)EHL/FHL/ADF/APF.  Good capillary refill.                                  8.4    12.45 )-----------( 183      ( 11 Feb 2021 06:39 )             26.6     02-11    135  |  97  |  16  ----------------------------<  110<H>  3.9   |  35<H>  |  0.71    Ca    8.6      11 Feb 2021 06:39        Impression:  56 y/o Female S/p R Total Knee Replacement POD# 3  Plan:  -  Pain control  -  1 unit of PRBC today   -  DVT prophylaxis  -  Daily Physical Therapy:  WBAT to RLE  -  Discharge planning: Home Vs. Rehab pending Physical therapy eval.  -  Heel bump to RLE  -  Incentive Spirometer  -  bp meds restarted as per medicine  -  Case d/w Dr. Jones

## 2021-02-11 NOTE — PROGRESS NOTE ADULT - REASON FOR ADMISSION
Right knee replacement
right knee pain
right knee pain
S/p R TKA
Right knee replacement
right knee pain

## 2021-02-11 NOTE — CONSULT NOTE ADULT - PROBLEM/RECOMMENDATION-3
Stat ECG was performed and given to Mercedes CLEMENTS.        
DISPLAY PLAN FREE TEXT

## 2021-02-12 LAB — SURGICAL PATHOLOGY STUDY: SIGNIFICANT CHANGE UP

## 2021-02-22 NOTE — PATIENT PROFILE ADULT - STATED REASON FOR ADMISSION
Occupational Therapy  Patient not seen in therapy today.     On hold due to medical condition    Pt transitioning to Hospice at this time, will sign off         OBJECTIVE                                                                                                                              Documented in the chart in the following areas: Assessment. Plan.       Patient with chest pain.

## 2021-03-26 PROBLEM — I10 ESSENTIAL (PRIMARY) HYPERTENSION: Chronic | Status: ACTIVE | Noted: 2021-02-04

## 2021-03-26 PROBLEM — M51.26 OTHER INTERVERTEBRAL DISC DISPLACEMENT, LUMBAR REGION: Chronic | Status: ACTIVE | Noted: 2021-02-04

## 2021-03-26 PROBLEM — M17.11 UNILATERAL PRIMARY OSTEOARTHRITIS, RIGHT KNEE: Chronic | Status: ACTIVE | Noted: 2021-02-04

## 2021-03-26 PROBLEM — J30.2 OTHER SEASONAL ALLERGIC RHINITIS: Chronic | Status: ACTIVE | Noted: 2021-02-04

## 2021-04-15 ENCOUNTER — APPOINTMENT (OUTPATIENT)
Dept: DISASTER EMERGENCY | Facility: OTHER | Age: 56
End: 2021-04-15
Payer: COMMERCIAL

## 2021-04-15 PROCEDURE — 0002A: CPT

## 2021-04-17 ENCOUNTER — APPOINTMENT (OUTPATIENT)
Dept: DISASTER EMERGENCY | Facility: OTHER | Age: 56
End: 2021-04-17

## 2021-07-19 NOTE — ED PROVIDER NOTE - MUSCULOSKELETAL [+], MLM
LVM for mother in regards to referral at the  as well as LM stating provider information for podiatry   [facility]

## 2021-10-08 NOTE — H&P ADULT - NEGATIVE CARDIOVASCULAR SYMPTOMS
[FreeTextEntry1] : Cut Crestor in half to 10mg and call me in a week\par If no change in pain will rx Livalo\par [facility]
no claudication/no palpitations/no orthopnea/no paroxysmal nocturnal dyspnea/no peripheral edema

## 2021-11-02 NOTE — ED PROVIDER NOTE - RATE
Patient daughter aware of results. She states patient is doing well. She is not having much shortness of breath. She is inquiring if patient should taper down on the prednisone. She is currently taking 10 mg daily. Please advise.   63

## 2022-01-27 NOTE — PATIENT PROFILE ADULT - LAST BOWEL MOVEMENT DATE
oriented to person, place and time , normal sensation , short and long term memory intact
28-Mar-2019

## 2022-03-15 ENCOUNTER — NON-APPOINTMENT (OUTPATIENT)
Age: 57
End: 2022-03-15

## 2022-03-15 ENCOUNTER — APPOINTMENT (OUTPATIENT)
Dept: CARDIOLOGY | Facility: CLINIC | Age: 57
End: 2022-03-15
Payer: COMMERCIAL

## 2022-03-15 VITALS
WEIGHT: 247 LBS | HEART RATE: 87 BPM | HEIGHT: 68 IN | OXYGEN SATURATION: 96 % | BODY MASS INDEX: 37.44 KG/M2 | DIASTOLIC BLOOD PRESSURE: 81 MMHG | SYSTOLIC BLOOD PRESSURE: 122 MMHG | TEMPERATURE: 97.4 F

## 2022-03-15 PROCEDURE — 93000 ELECTROCARDIOGRAM COMPLETE: CPT

## 2022-03-15 PROCEDURE — 99214 OFFICE O/P EST MOD 30 MIN: CPT

## 2022-03-15 NOTE — HISTORY OF PRESENT ILLNESS
[FreeTextEntry1] : Here for followup. No complaints.\par TTE July 2020- Moderate segmental LV dysfunction EF 36%\par Denies chest pain, dyspnea or edema\par 1. CAD- AWMI with PCI to dLAD (march 2019) Nuclear stress test Oct 2019 with fixed apical and distal inferior defects\par On ASA .Continue present meds\par 2. HTN- On Metoprolol, continue present meds\par 3. HLD- On Statin therapy continue present meds\par 4. Preop for dental procedure on general anesthesia- patient with no symptoms, nuclear stress test with fixed defects and echo in July 2020 with moderate segmental dysfunction. No signs of active ischemia or fluid overload--can proceed without further workup\par \par

## 2022-03-15 NOTE — REASON FOR VISIT
[Follow-Up - Clinic] : a clinic follow-up of [CV Risk Factors and Non-Cardiac Disease] : CV risk factors and non-cardiac disease [Hypertension] : hypertension [Coronary Artery Disease] : coronary artery disease [FreeTextEntry2] : STEMI PCI to dLAD HTN HLD

## 2022-03-15 NOTE — DISCUSSION/SUMMARY
[FreeTextEntry1] : 54 year old woman with CAD HTN HLD here for followup\par \par 1. CAD- AWMI with PCI to dLAD (march 2019) Nuclear stress test Oct 2019 with fixed apical and distal inferior defects\par On ASA .Continue present meds\par 2. HTN- On Metoprolol, continue present meds\par 3. HLD- On Statin therapy continue present meds\par 4. Preop for dental procedure on general anesthesia- patient with no symptoms, nuclear stress test with fixed defects and echo in July 2020 with moderate segmental dysfunction. No signs of active ischemia or fluid overload--can proceed without further workup\par 5. FU in 6 months

## 2022-10-10 NOTE — REASON FOR VISIT
DNR/DNI, MOLST form completed with family today.   Patient has HCP form completed 7/2022 (found on Patient window tab) designating his daughter, Faith Hendrickson, as his proxy.  Palliative team will remain available for ongoing support. [Follow-Up - Clinic] : a clinic follow-up of [FreeTextEntry2] : STEMI with PCI to pLAD

## 2022-11-07 ENCOUNTER — APPOINTMENT (OUTPATIENT)
Dept: CARDIOLOGY | Facility: CLINIC | Age: 57
End: 2022-11-07
Payer: COMMERCIAL

## 2022-11-07 ENCOUNTER — NON-APPOINTMENT (OUTPATIENT)
Age: 57
End: 2022-11-07

## 2022-11-07 VITALS
BODY MASS INDEX: 36.37 KG/M2 | DIASTOLIC BLOOD PRESSURE: 80 MMHG | HEIGHT: 68 IN | SYSTOLIC BLOOD PRESSURE: 121 MMHG | OXYGEN SATURATION: 96 % | WEIGHT: 240 LBS | HEART RATE: 89 BPM

## 2022-11-07 PROCEDURE — 93000 ELECTROCARDIOGRAM COMPLETE: CPT

## 2022-11-07 PROCEDURE — 99214 OFFICE O/P EST MOD 30 MIN: CPT | Mod: 25

## 2022-11-07 NOTE — DISCUSSION/SUMMARY
[EKG obtained to assist in diagnosis and management of assessed problem(s)] : EKG obtained to assist in diagnosis and management of assessed problem(s) [FreeTextEntry1] : 57 year old woman with CAD HTN HLD here for followup\par \par # CAD- AWMI with PCI to dLAD (march 2019) Nuclear stress test Oct 2019 with fixed apical and distal inferior defects\par On ASA .Continue present meds\par #Moderate LV dysfunction on TTE in 2020- Continue Metoprolol, Will check TTE \par #HTN- On Metoprolol, continue present meds\par Restart Lasix\par # HLD- On Statin therapy continue present meds\par # FU in 6 months

## 2022-11-07 NOTE — HISTORY OF PRESENT ILLNESS
[FreeTextEntry1] : Here for followup. No complaints.\par TTE July 2020- Moderate segmental LV dysfunction EF 36%\par She had covid in October\par She stopped her Lasix about 2 weeks ago and she noticed some increased dyspnea and edema in lower extremities.\par # CAD- AWMI with PCI to dLAD (march 2019) Nuclear stress test Oct 2019 with fixed apical and distal inferior defects\par On ASA .Continue present meds\par #Moderate LV dysfunction on TTE in 2020- Continue Metoprolol, Will check TTE \par #HTN- On Metoprolol, continue present meds\par Restart Lasix\par # HLD- On Statin therapy continue present meds\par \par \par

## 2022-11-07 NOTE — REASON FOR VISIT
[CV Risk Factors and Non-Cardiac Disease] : CV risk factors and non-cardiac disease [Hypertension] : hypertension [Coronary Artery Disease] : coronary artery disease [Follow-Up - Clinic] : a clinic follow-up of [FreeTextEntry2] : STEMI PCI to dLAD HTN HLD

## 2023-03-21 ENCOUNTER — APPOINTMENT (OUTPATIENT)
Dept: CARDIOLOGY | Facility: CLINIC | Age: 58
End: 2023-03-21
Payer: COMMERCIAL

## 2023-03-21 ENCOUNTER — NON-APPOINTMENT (OUTPATIENT)
Age: 58
End: 2023-03-21

## 2023-03-21 VITALS
SYSTOLIC BLOOD PRESSURE: 125 MMHG | BODY MASS INDEX: 38.95 KG/M2 | HEIGHT: 68 IN | WEIGHT: 257 LBS | OXYGEN SATURATION: 95 % | HEART RATE: 72 BPM | DIASTOLIC BLOOD PRESSURE: 85 MMHG

## 2023-03-21 PROCEDURE — 93000 ELECTROCARDIOGRAM COMPLETE: CPT

## 2023-03-21 PROCEDURE — 99214 OFFICE O/P EST MOD 30 MIN: CPT | Mod: 25

## 2023-03-21 NOTE — HISTORY OF PRESENT ILLNESS
[FreeTextEntry1] : Here for followup. No complaints.\par TTE July 2020- Moderate segmental LV dysfunction EF 36%\par She had covid in October\par She stopped her Lasix daily and is using prn\par # CAD- AWMI with PCI to dLAD (march 2019) Nuclear stress test Oct 2019 with fixed apical and distal inferior defects\par On ASA .Continue present meds\par #Moderate LV dysfunction on TTE in 2020- Continue Metoprolol,\par #HTN- On Metoprolol, Lasix as needed; continue present meds\par # HLD- On Statin therapy continue present meds\par \par \par

## 2023-03-21 NOTE — DISCUSSION/SUMMARY
[FreeTextEntry1] : 57 year old woman with CAD HTN HLD here for followup\par \par # CAD- AWMI with PCI to dLAD (march 2019) Nuclear stress test Oct 2019 with fixed apical and distal inferior defects\par On ASA .Continue present meds\par #Moderate LV dysfunction on TTE in 2020- Continue Metoprolol, check TTE\par #HTN- On Metoprolol, Lasix as needed; continue present meds\par # HLD- On Statin therapy continue present meds\par # FU in 6 months [EKG obtained to assist in diagnosis and management of assessed problem(s)] : EKG obtained to assist in diagnosis and management of assessed problem(s)

## 2023-03-27 NOTE — H&P PST ADULT - HEIGHT IN INCHES
Mrs. Tasha Hawley is a 63 yo woman with adrenal insufficiency, hypothyroidism, chronic lower back pain, CECI (not on CPAP), Chronic Hypercapnic Respiratory Failure, Chronic Diastolic Heart Failure, Morbid Obesity who initially presented due to worsening L Leg pain & dysuria. During her hospitalization, has felt better overall, however over the past year she has noted worsening dyspnea. This occurs at rest, with activity. It comes and goes, worse when she lays flat, significant orthopnea per patient, however it has only become more frequent. She states that in the past, when she had her CPAP it would make sleeping at night easier, however it was taken away (she reports being unsure why). She does endorse missing medications on occasion. Not as active as she used to be, gets around with a rolling walker however does not get out or do much. She does drink 6-8 cans of 7UP a day, doesn't eat much large meals due to her GERD and aspiration (takes prilosec). 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 No history of ILD or family history of ILD. Not sickly as a child, no PND nor asthma. We discussed with her the multiple comorbidities and she is aware. Endocrinology following to assist with her AI and hypothyroidism.   8

## 2023-08-02 ENCOUNTER — APPOINTMENT (OUTPATIENT)
Dept: SURGERY | Facility: CLINIC | Age: 58
End: 2023-08-02
Payer: COMMERCIAL

## 2023-08-02 VITALS
HEIGHT: 68 IN | HEART RATE: 100 BPM | WEIGHT: 248 LBS | BODY MASS INDEX: 37.59 KG/M2 | DIASTOLIC BLOOD PRESSURE: 76 MMHG | SYSTOLIC BLOOD PRESSURE: 121 MMHG | RESPIRATION RATE: 18 BRPM

## 2023-08-02 DIAGNOSIS — E66.9 OBESITY, UNSPECIFIED: ICD-10-CM

## 2023-08-02 PROCEDURE — 99204 OFFICE O/P NEW MOD 45 MIN: CPT

## 2023-08-02 RX ORDER — PEN NEEDLE, DIABETIC 32 GX 1/4"
32G X 6 MM NEEDLE, DISPOSABLE MISCELLANEOUS
Qty: 100 | Refills: 0 | Status: ACTIVE | COMMUNITY
Start: 2023-08-02 | End: 1900-01-01

## 2023-08-02 RX ORDER — LIRAGLUTIDE 6 MG/ML
18 INJECTION, SOLUTION SUBCUTANEOUS
Qty: 15 | Refills: 1 | Status: ACTIVE | COMMUNITY
Start: 2023-08-02 | End: 1900-01-01

## 2023-08-02 NOTE — ASSESSMENT
[FreeTextEntry1] : As discussed above, we will try to obtain authorization for once daily Saxenda injection.  Patient agrees with plan. I have discussed initiating Saxenda therapy for Jojo.  all risks and benefits have been discussed with the patient. Risks include but are not limited to nausea, vomiting, constipation, diarrhea, and Gi upset. Contraindications include Pancreatitis, MENS type 2 and medullary thyroid cancer, and pregnancy. Pt. verbalize understanding and wishes to proceed with medical therapy. Instructions for use provided via office demonstration.  Saxenda ordered at today's visit.  Demonstration pen provided in office and patient understands directions of usage. [] Continue working with Mayi Williamson as she has already lost 10 pounds since seeing her 1 month ago. [] See me in 2 months for follow-up.

## 2023-08-02 NOTE — REVIEW OF SYSTEMS
Subjective:      Beck Quintero is a 9 y.o. male who presents with Oral Swelling (blisters on lips x 5 days)            5 days rash emanating from right angle of lips.  Scant discharge.  No obvious vesicles.  No fever.  No prodrome.  No sore throat.  No oral swelling.  No eye involvement.  No relief with trial of OTC medications.  No other aggravating or alleviating factors.        Review of Systems   HENT: Negative for ear discharge and ear pain.    Eyes: Negative for discharge and redness.   Musculoskeletal: Negative for joint pain, myalgias and neck pain.          Objective:     /78 (BP Location: Right arm, Patient Position: Sitting, BP Cuff Size: Adult)   Pulse 85   Temp 36.7 °C (98 °F)   Resp 20   Wt 54.2 kg (119 lb 6.4 oz)   SpO2 97%      Physical Exam   Constitutional: He appears well-developed and well-nourished. He is active. No distress.   HENT:   Right Ear: Tympanic membrane normal.   Left Ear: Tympanic membrane normal.   Nose: Nose normal.   Mouth/Throat: Mucous membranes are moist.   Erythematous plaque with scant yellow discharge at right angle of lips extending 2 cm laterally.  No fluctuance.  No active drainage.  No obvious vesicles.   Eyes: Conjunctivae are normal.   Cardiovascular: Regular rhythm, S1 normal and S2 normal.    Pulmonary/Chest: Effort normal and breath sounds normal. He has no wheezes.   Neurological: He is alert. He exhibits normal muscle tone.   Skin: Skin is warm and dry. No rash noted.               Assessment/Plan:     1. Angular cheilitis  DISCONTINUED: mupirocin (BACTROBAN) 2 % Ointment     Differential diagnosis, natural history, supportive care, and indications for immediate follow-up discussed at length.     Etiology possibly multifactorial.  Currently most resembles impetigo and will treat as such with mupirocin.  
[Negative] : Allergic/Immunologic

## 2023-08-02 NOTE — HISTORY OF PRESENT ILLNESS
[Improved Health] : Improved health [Quality of Life] : Quality of life [Improved Mobility] : Improved mobility [Decrease Medications] : Decrease medications [6] : 6 [FreeTextEntry1] : Jojo is a 57-year-old female who presents for initial obesity medicine consultation. MI 36 with hx. of MI, HTN. Denies diabetes.  Patient is working with dietitian Mayi Williamson to assess for weight loss.  We discussed several different options in terms of medical weight loss and decided that Saxenda would be the most appropriate at the time due to insurance coverage and [employer] shortages.  I did inform the patient that Saxenda is a once daily injection and she agrees to proceed with plan.

## 2023-09-11 DIAGNOSIS — Z01.818 ENCOUNTER FOR OTHER PREPROCEDURAL EXAMINATION: ICD-10-CM

## 2023-10-03 ENCOUNTER — APPOINTMENT (OUTPATIENT)
Dept: SURGERY | Facility: CLINIC | Age: 58
End: 2023-10-03

## 2023-10-09 ENCOUNTER — APPOINTMENT (OUTPATIENT)
Dept: SURGERY | Facility: CLINIC | Age: 58
End: 2023-10-09

## 2023-10-16 NOTE — REVIEW OF SYSTEMS
Left PICC flushed easily. Dressing has large amount of fluid under it. Dressing removed. Jaclyn sized raw, red area around insertion site. All area under dressing has red blistered, wet looking rash. Entire area very sensitive to cleaning. Call and message left with Dr. Samuel's office. Area cleaned with 70% alcohol, bio patch and stat lock applied then gentec used to redress. Will reassess tomorrow and will recall the office tomorrow.   [Negative] : Heme/Lymph

## 2023-11-02 ENCOUNTER — APPOINTMENT (OUTPATIENT)
Dept: CARDIOLOGY | Facility: CLINIC | Age: 58
End: 2023-11-02

## 2023-12-04 NOTE — ED PROVIDER NOTE - PSH
H/O: hysterectomy    History of Dilatation and Curettage    History of Nasal Septoplasty and sinus surgery 2003    left Knee  trauma  h/o repair 1985    S/P Oophorectomy right 2006 CTA with occlusion of R PCA P2 segment. Not TPA candidate.  CTH with acute/subacute infarcts within the right paramedian occipital lobe as well as the right cerebellar hemisphere. No hemorrhagic transformation.  Upon discussion with neuro AC was resumed  f/u MRI brain as outpatient per neuro  EEG showed focal cerebral dysfunction of R and L temporal lobe, mild diffuse cerebral dysfunction, without epileptiform abnormalities. Neurology reviewed and discontinued EEG, recommended discontinuation of keppra  -A1c/lipid panel: HBA1C 7.9,LDL at goal -44  -s/p Permissive HTN <220/120 for first 24h.  -Passed dysphagia screen - diet ordered.  -PT/OT evaluation.: rec CELSO but family declined  -source is thromboembolic - c/w lovenox

## 2023-12-21 ENCOUNTER — APPOINTMENT (OUTPATIENT)
Dept: SURGERY | Facility: CLINIC | Age: 58
End: 2023-12-21
Payer: COMMERCIAL

## 2023-12-21 VITALS — WEIGHT: 227 LBS | BODY MASS INDEX: 34.4 KG/M2 | HEIGHT: 68 IN

## 2023-12-21 VITALS — BODY MASS INDEX: 33.34 KG/M2 | WEIGHT: 220 LBS | HEIGHT: 68 IN

## 2023-12-21 PROCEDURE — 99213 OFFICE O/P EST LOW 20 MIN: CPT | Mod: 95

## 2023-12-21 NOTE — PHYSICAL EXAM
[FreeTextEntry1] : Physical exam limited due to telehealth visit.  Current BMI 34.52.  Denies negative side effects from topiramate.

## 2023-12-21 NOTE — ASSESSMENT
[FreeTextEntry1] : In summary Jojo presents for obesity management follow-up.  She was started on topiramate 3 months ago and has lost 21 pounds however has reached a weight loss plateau.  I will increase her topiramate to total 75 mg daily and I will try to obtain authorization for Zepp bound once weekly injection.  Prescription placed and authorization request sent to Bobby.  I will see patient in 2 months for follow-up.  I have discussed initiating Zepbound therapy for Jooj  All risks and benefits have been discussed with the patient. Risks include but are not limited to nausea, vomiting, constipation, diarrhea, and Gi upset. Contraindications include Pancreatitis, MENS type 2 and  medullary thyroid cancer, and pregnancy. Pt. verbalize understanding and wishes to proceed with medical therapy. Instructions for use provided via office demonstration.

## 2023-12-21 NOTE — HISTORY OF PRESENT ILLNESS
[Home] : at home, [unfilled] , at the time of the visit. [Medical Office: ([facility])___] : at the medical office located in  [Verbal consent obtained from patient] : the patient, [unfilled] [FreeTextEntry1] : mook is a 57-year-old female who presented for initial obesity medicine consultation in August. BMI was38 with hx. of MI, HTN. Denies diabetes. Patient is working with dietitian Mayi Williamson to assess for weight loss.  Patient was seen 3 months ago and started on topiramate a total of 50 mg/day.  She has lost 21 pounds in the last 3 months however has had a weight loss plateau.  I will increase her topiramate to total 75 mg once daily.  She will take 25 mg in the morning and 50 mg nightly.  We also discussed trying to initiate authorization for Zepbound once weekly injection.  We reviewed all possible side effects of this medication.  Patient wishes to proceed with plan.

## 2023-12-26 RX ORDER — TIRZEPATIDE 2.5 MG/.5ML
2.5 INJECTION, SOLUTION SUBCUTANEOUS
Qty: 4 | Refills: 0 | Status: ACTIVE | COMMUNITY
Start: 2023-12-21

## 2024-01-24 ENCOUNTER — NON-APPOINTMENT (OUTPATIENT)
Age: 59
End: 2024-01-24

## 2024-02-05 ENCOUNTER — APPOINTMENT (OUTPATIENT)
Dept: CARDIOLOGY | Facility: CLINIC | Age: 59
End: 2024-02-05
Payer: COMMERCIAL

## 2024-02-05 ENCOUNTER — RX RENEWAL (OUTPATIENT)
Age: 59
End: 2024-02-05

## 2024-02-05 ENCOUNTER — NON-APPOINTMENT (OUTPATIENT)
Age: 59
End: 2024-02-05

## 2024-02-05 VITALS
SYSTOLIC BLOOD PRESSURE: 132 MMHG | DIASTOLIC BLOOD PRESSURE: 74 MMHG | HEART RATE: 66 BPM | OXYGEN SATURATION: 95 % | WEIGHT: 220 LBS | BODY MASS INDEX: 33.34 KG/M2 | HEIGHT: 68 IN | TEMPERATURE: 97 F

## 2024-02-05 DIAGNOSIS — E78.5 HYPERLIPIDEMIA, UNSPECIFIED: ICD-10-CM

## 2024-02-05 DIAGNOSIS — I25.10 ATHEROSCLEROTIC HEART DISEASE OF NATIVE CORONARY ARTERY W/OUT ANGINA PECTORIS: ICD-10-CM

## 2024-02-05 DIAGNOSIS — I21.3 ST ELEVATION (STEMI) MYOCARDIAL INFARCTION OF UNSPECIFIED SITE: ICD-10-CM

## 2024-02-05 PROCEDURE — 93000 ELECTROCARDIOGRAM COMPLETE: CPT

## 2024-02-05 PROCEDURE — 99214 OFFICE O/P EST MOD 30 MIN: CPT | Mod: 25

## 2024-02-05 RX ORDER — METOPROLOL SUCCINATE 25 MG/1
25 TABLET, EXTENDED RELEASE ORAL
Qty: 90 | Refills: 3 | Status: ACTIVE | COMMUNITY
Start: 2019-04-01 | End: 1900-01-01

## 2024-02-05 RX ORDER — ATORVASTATIN CALCIUM 40 MG/1
40 TABLET, FILM COATED ORAL DAILY
Qty: 90 | Refills: 3 | Status: ACTIVE | COMMUNITY
Start: 2019-04-01 | End: 1900-01-01

## 2024-02-05 RX ORDER — FUROSEMIDE 20 MG/1
20 TABLET ORAL DAILY
Qty: 90 | Refills: 3 | Status: ACTIVE | COMMUNITY
Start: 2020-06-22 | End: 1900-01-01

## 2024-02-05 RX ORDER — LOSARTAN POTASSIUM 25 MG/1
25 TABLET, FILM COATED ORAL
Qty: 90 | Refills: 3 | Status: ACTIVE | COMMUNITY
Start: 2022-11-07 | End: 1900-01-01

## 2024-02-05 NOTE — DISCUSSION/SUMMARY
[EKG obtained to assist in diagnosis and management of assessed problem(s)] : EKG obtained to assist in diagnosis and management of assessed problem(s) [FreeTextEntry1] : 58 year old woman with CAD HTN HLD here for followup  # CAD- AWMI with PCI to dLAD (march 2019) Nuclear stress test Oct 2019 with fixed apical and distal inferior defects On ASA .Continue present meds #Moderate LV dysfunction on TTE in 2020- Continue Metoprolol, #HTN- On Metoprolol, Lasix as needed; continue present meds # HLD- On Statin therapy continue present meds #Medically optimized for carpal tunnel surgery; no further cardiac workup needed # FU in 6 months

## 2024-02-05 NOTE — HISTORY OF PRESENT ILLNESS
[FreeTextEntry1] : Here for followup. No complaints. TTE July 2020- Moderate segmental LV dysfunction EF 36% She stopped her Lasix daily and is using prn # CAD- AWMI with PCI to dLAD (march 2019) Nuclear stress test Oct 2019 with fixed apical and distal inferior defects On ASA .Continue present meds #Moderate LV dysfunction on TTE in 2020- Continue Metoprolol, #HTN- On Metoprolol, Lasix as needed; continue present meds # HLD- On Statin therapy continue present meds #Medically optimized for carpal tunnel surgery; no further cardiac workup needed

## 2024-06-09 RX ORDER — TOPIRAMATE 25 MG/1
25 TABLET, FILM COATED ORAL
Qty: 90 | Refills: 1 | Status: ACTIVE | COMMUNITY
Start: 2023-08-17 | End: 1900-01-01

## 2024-06-19 NOTE — H&P ADULT - RESPIRATORY AND THORAX
Shave Biopsy Wound Care    Your doctor has performed a shave biopsy today.  A band aid and vaseline ointment has been placed over the site.  This should remain in place for NO LONGER THAN 48 hours.  It is fine to remove the bandaid after 24 hours, if the area is no longer bleeding. It is recommended that you keep the area dry (do not wet)) for the first 24 hours.  After 24 hours, wash the area with warm soap and water and apply Vaseline jelly.  Many patients prefer to use Neosporin or Bacitracin ointment.  This is acceptable; however, know that you can develop an allergy to this medication even if you have used it safely for years.  It is important to keep the area moist.  Letting it dry out and get air slows healing time, and will worsen the scar.        If you notice increasing redness, tenderness, pain, or yellow drainage at the biopsy site, please notify your doctor.  These are signs of an infection.    If your biopsy site is bleeding, apply firm pressure for 15 minutes straight.  Repeat for another 15 minutes, if it is still bleeding.   If the surgical site continues to bleed, then please contact your doctor.      For [facility] users:   You will receive your biopsy results in [facility] as soon as they are available. Please be assured that your physician/provider will review your results and will then determine what further treatment, evaluation, or planning is required. You should be contacted by your physician's/provider's office within 5 business days of receiving your results; If not, please reach out to directly. This is one more way [facility] is putting you first.     [facility] [place] [address]/ [phone] FAX/ [email or web address]       
details…

## 2024-09-12 ENCOUNTER — APPOINTMENT (OUTPATIENT)
Dept: SURGERY | Facility: CLINIC | Age: 59
End: 2024-09-12
Payer: COMMERCIAL

## 2024-09-12 VITALS — BODY MASS INDEX: 26.52 KG/M2 | HEIGHT: 68 IN | WEIGHT: 175 LBS

## 2024-09-12 PROCEDURE — 99213 OFFICE O/P EST LOW 20 MIN: CPT

## 2024-09-12 NOTE — ASSESSMENT
[FreeTextEntry1] : In summary Jojo is a 58-year-old female who presents for obesity medicine follow-up.  She was initially prescribed Zepbound however denied by her insurance company, [employer].  She was later placed on topiramate and escalated dose to now 50 mg twice daily for a total of 100 mg daily.  She has done exceptionally well with no reported adverse side effects.  This has reduced her cravings and she has lost almost 80 pounds in 1 years time.  Will continue this treatment and see in 3 months for follow-up.  Blood work done in February with her PCP for an annual physical and will stated we fax the results for review.  All questions and concerns answered at today's visit.  Medication refilled for an additional 2 months.

## 2024-09-12 NOTE — ASSESSMENT
[FreeTextEntry1] : In summary Jojo is a 58-year-old female who presents for obesity medicine follow-up.  She was initially prescribed Zepbound however denied by her insurance company, [employer].  She was later placed on topiramate and escalated dose to now 50 mg twice daily for a total of 100 mg daily.  She has done exceptionally well with no reported adverse side effects.  This has reduced her cravings and she has lost almost 80 pounds in 1 years time.  Will continue this treatment and see in 3 months for follow-up.  Blood work done in February with her PCP for an annual physical and will stated we fax the results for review.  All questions and concerns answered at today's visit.  Medication refilled for an additional 2 months.

## 2024-09-12 NOTE — HISTORY OF PRESENT ILLNESS
[Home] : at home, [unfilled] , at the time of the visit. [Medical Office: ([facility])___] : at the medical office located in  [Verbal consent obtained from patient] : the patient, [unfilled] [FreeTextEntry1] : mook is a 58-year-old female who presented for initial obesity medicine consultation in August 2023. BMI was38 with hx. of MI, HTN. Denies diabetes. Patient is working with dietitian Mayi Williamson to assess for weight loss.   was started on Topamax 50mg dose. bid last seen 12/2023.   Patient has amazing weight were also results while on Topamax 100 mg total daily.  She currently weighs 175 pounds with a BMI of 26.61.  She is only 15 to 20 pounds away from her personal goal weight.  She has lost almost 80 pounds since August of last year.  Continues to increase her physical activity as tolerated and following a low fat low carb high-protein diet as discussed.  Will see in 3 months for follow-up.

## 2024-09-12 NOTE — HISTORY OF PRESENT ILLNESS
[Home] : at home, [unfilled] , at the time of the visit. [Medical Office: ([facility])___] : at the medical office located in  [Verbal consent obtained from patient] : the patient, [unfilled] [FreeTextEntry1] : mook is a 58-year-old female who presented for initial obesity medicine consultation in August 2023. BMI was38 with hx. of MI, HTN. Denies diabetes. Patient is working with dietitian Mayi Williamson to assess for weight loss.   was started on Topamax 50mg dose. bid last seen 12/2023.   Patient has amazing weight were also results while on Topamax 100 mg total daily.  She currently weighs 175 pounds with a BMI of 26.61.  She is only 15 to 20 pounds away from her personal goal weight.  She has lost almost 80 pounds since August of last year.  Continues to increase her physical activity as tolerated and following a low fat low carb high-protein diet as discussed.  Will see in 3 months for follow-up.

## 2025-02-06 ENCOUNTER — APPOINTMENT (OUTPATIENT)
Dept: SURGERY | Facility: CLINIC | Age: 60
End: 2025-02-06
Payer: COMMERCIAL

## 2025-02-06 VITALS — BODY MASS INDEX: 26.07 KG/M2 | WEIGHT: 172 LBS | HEIGHT: 68 IN

## 2025-02-06 PROCEDURE — 99213 OFFICE O/P EST LOW 20 MIN: CPT | Mod: 95

## 2025-02-19 ENCOUNTER — APPOINTMENT (OUTPATIENT)
Dept: SURGERY | Facility: CLINIC | Age: 60
End: 2025-02-19

## 2025-04-05 ENCOUNTER — EMERGENCY (EMERGENCY)
Facility: HOSPITAL | Age: 60
LOS: 1 days | End: 2025-04-05
Attending: EMERGENCY MEDICINE | Admitting: EMERGENCY MEDICINE
Payer: COMMERCIAL

## 2025-04-05 VITALS
SYSTOLIC BLOOD PRESSURE: 122 MMHG | RESPIRATION RATE: 18 BRPM | HEART RATE: 94 BPM | WEIGHT: 171.08 LBS | TEMPERATURE: 101 F | OXYGEN SATURATION: 97 % | DIASTOLIC BLOOD PRESSURE: 75 MMHG

## 2025-04-05 VITALS
SYSTOLIC BLOOD PRESSURE: 113 MMHG | HEART RATE: 83 BPM | DIASTOLIC BLOOD PRESSURE: 67 MMHG | RESPIRATION RATE: 18 BRPM | OXYGEN SATURATION: 96 % | TEMPERATURE: 100 F

## 2025-04-05 DIAGNOSIS — Z98.890 OTHER SPECIFIED POSTPROCEDURAL STATES: Chronic | ICD-10-CM

## 2025-04-05 DIAGNOSIS — Z95.5 PRESENCE OF CORONARY ANGIOPLASTY IMPLANT AND GRAFT: Chronic | ICD-10-CM

## 2025-04-05 DIAGNOSIS — Z90.721 ACQUIRED ABSENCE OF OVARIES, UNILATERAL: Chronic | ICD-10-CM

## 2025-04-05 DIAGNOSIS — Z90.710 ACQUIRED ABSENCE OF BOTH CERVIX AND UTERUS: Chronic | ICD-10-CM

## 2025-04-05 LAB
ALBUMIN SERPL ELPH-MCNC: 4.1 G/DL — SIGNIFICANT CHANGE UP (ref 3.3–5)
ALP SERPL-CCNC: 135 U/L — HIGH (ref 40–120)
ALT FLD-CCNC: 20 U/L — SIGNIFICANT CHANGE UP (ref 4–33)
ANION GAP SERPL CALC-SCNC: 17 MMOL/L — HIGH (ref 7–14)
AST SERPL-CCNC: 33 U/L — HIGH (ref 4–32)
BASOPHILS # BLD AUTO: 0.03 K/UL — SIGNIFICANT CHANGE UP (ref 0–0.2)
BASOPHILS NFR BLD AUTO: 0.3 % — SIGNIFICANT CHANGE UP (ref 0–2)
BILIRUB SERPL-MCNC: 0.7 MG/DL — SIGNIFICANT CHANGE UP (ref 0.2–1.2)
BLOOD GAS VENOUS COMPREHENSIVE RESULT: SIGNIFICANT CHANGE UP
BUN SERPL-MCNC: 10 MG/DL — SIGNIFICANT CHANGE UP (ref 7–23)
CALCIUM SERPL-MCNC: 9.5 MG/DL — SIGNIFICANT CHANGE UP (ref 8.4–10.5)
CHLORIDE SERPL-SCNC: 103 MMOL/L — SIGNIFICANT CHANGE UP (ref 98–107)
CO2 SERPL-SCNC: 18 MMOL/L — LOW (ref 22–31)
CREAT SERPL-MCNC: 0.73 MG/DL — SIGNIFICANT CHANGE UP (ref 0.5–1.3)
EGFR: 94 ML/MIN/1.73M2 — SIGNIFICANT CHANGE UP
EGFR: 94 ML/MIN/1.73M2 — SIGNIFICANT CHANGE UP
EOSINOPHIL # BLD AUTO: 0 K/UL — SIGNIFICANT CHANGE UP (ref 0–0.5)
EOSINOPHIL NFR BLD AUTO: 0 % — SIGNIFICANT CHANGE UP (ref 0–6)
FLUAV AG NPH QL: SIGNIFICANT CHANGE UP
FLUBV AG NPH QL: SIGNIFICANT CHANGE UP
GLUCOSE SERPL-MCNC: 95 MG/DL — SIGNIFICANT CHANGE UP (ref 70–99)
HCT VFR BLD CALC: 41.9 % — SIGNIFICANT CHANGE UP (ref 34.5–45)
HGB BLD-MCNC: 13.4 G/DL — SIGNIFICANT CHANGE UP (ref 11.5–15.5)
IANC: 9.16 K/UL — HIGH (ref 1.8–7.4)
IMM GRANULOCYTES NFR BLD AUTO: 0.5 % — SIGNIFICANT CHANGE UP (ref 0–0.9)
LYMPHOCYTES # BLD AUTO: 1.25 K/UL — SIGNIFICANT CHANGE UP (ref 1–3.3)
LYMPHOCYTES # BLD AUTO: 10.7 % — LOW (ref 13–44)
MCHC RBC-ENTMCNC: 27.4 PG — SIGNIFICANT CHANGE UP (ref 27–34)
MCHC RBC-ENTMCNC: 32 G/DL — SIGNIFICANT CHANGE UP (ref 32–36)
MCV RBC AUTO: 85.7 FL — SIGNIFICANT CHANGE UP (ref 80–100)
MONOCYTES # BLD AUTO: 1.21 K/UL — HIGH (ref 0–0.9)
MONOCYTES NFR BLD AUTO: 10.3 % — SIGNIFICANT CHANGE UP (ref 2–14)
NEUTROPHILS # BLD AUTO: 9.16 K/UL — HIGH (ref 1.8–7.4)
NEUTROPHILS NFR BLD AUTO: 78.2 % — HIGH (ref 43–77)
NRBC # BLD AUTO: 0 K/UL — SIGNIFICANT CHANGE UP (ref 0–0)
NRBC # FLD: 0 K/UL — SIGNIFICANT CHANGE UP (ref 0–0)
NRBC BLD AUTO-RTO: 0 /100 WBCS — SIGNIFICANT CHANGE UP (ref 0–0)
PLATELET # BLD AUTO: 153 K/UL — SIGNIFICANT CHANGE UP (ref 150–400)
POTASSIUM SERPL-MCNC: 4.5 MMOL/L — SIGNIFICANT CHANGE UP (ref 3.5–5.3)
POTASSIUM SERPL-SCNC: 4.5 MMOL/L — SIGNIFICANT CHANGE UP (ref 3.5–5.3)
PROCALCITONIN SERPL-MCNC: 0.07 NG/ML — SIGNIFICANT CHANGE UP (ref 0.02–0.1)
PROT SERPL-MCNC: 7.4 G/DL — SIGNIFICANT CHANGE UP (ref 6–8.3)
RBC # BLD: 4.89 M/UL — SIGNIFICANT CHANGE UP (ref 3.8–5.2)
RBC # FLD: 14 % — SIGNIFICANT CHANGE UP (ref 10.3–14.5)
RSV RNA NPH QL NAA+NON-PROBE: SIGNIFICANT CHANGE UP
SARS-COV-2 RNA SPEC QL NAA+PROBE: SIGNIFICANT CHANGE UP
SODIUM SERPL-SCNC: 138 MMOL/L — SIGNIFICANT CHANGE UP (ref 135–145)
SOURCE RESPIRATORY: SIGNIFICANT CHANGE UP
WBC # BLD: 11.71 K/UL — HIGH (ref 3.8–10.5)
WBC # FLD AUTO: 11.71 K/UL — HIGH (ref 3.8–10.5)

## 2025-04-05 PROCEDURE — 99285 EMERGENCY DEPT VISIT HI MDM: CPT

## 2025-04-05 PROCEDURE — 71046 X-RAY EXAM CHEST 2 VIEWS: CPT | Mod: 26

## 2025-04-05 PROCEDURE — 93010 ELECTROCARDIOGRAM REPORT: CPT

## 2025-04-05 RX ORDER — ACETAMINOPHEN 500 MG/5ML
1000 LIQUID (ML) ORAL ONCE
Refills: 0 | Status: COMPLETED | OUTPATIENT
Start: 2025-04-05 | End: 2025-04-05

## 2025-04-05 RX ADMIN — Medication 400 MILLIGRAM(S): at 17:46

## 2025-04-05 RX ADMIN — Medication 500 MILLILITER(S): at 17:46

## 2025-04-05 NOTE — ED PROVIDER NOTE - CARE PLAN
1 Principal Discharge DX:	Fever  Secondary Diagnosis:	Body aches   Principal Discharge DX:	Viral URI  Secondary Diagnosis:	Body aches

## 2025-04-05 NOTE — ED PROVIDER NOTE - ATTENDING CONTRIBUTION TO CARE
59-year-old female with past medical history of anterior wall MI s/p 1 stent, herniated disc in the lumbar area, hypertension, hyperlipidemia, CHF, asthma, presenting to the ED with flulike symptoms.  Fever body aches and cough with low-grade sputum.  Fever to 103.3.  Has taken Tylenol last night.  Some minimal relief.  Therefore he came to the ER.  Had COVID test at home that was negative.  Has been able to tolerate p.o. however decreased p.o. intake.  No nausea or vomiting.  No abdominal pain.  Minimal chest pain and shortness of breath.  EKG normal sinus and nonspecific T wave inversions.  No acute ischemia noted.  Vitals wnl other than fever to 100.8F.  Physical exam shows slightly tired appearing female not in acute distress.  Alert and oriented x 4 moving extremities and following commands.  Not in acute respiratory distress.  No crackles or wheezing.  No murmurs.  Abdomen soft and nontender.  No pitting edema or pain to palpation of the calf muscles.  Concern at this time for flulike symptoms.  Do not think this is pneumonia however given comorbidities and age will obtain screening labs and chest x-ray.  Will give 500 cc of fluids as patient EF is documented as 35% in 2020.  Will obtain flu COVID RSV swab.  Will give Ofirmev.  Will reassess.  Disposition pending labs imaging and reassessment.

## 2025-04-05 NOTE — ED PROVIDER NOTE - NSFOLLOWUPINSTRUCTIONS_ED_ALL_ED_FT
YOU WERE SEEN FOR flu like symptoms.     YOU HAD blood work and chest x ray done. These results are included in your discharge paperwork.   You can take ibuprofen 400mg every 6 to 8 hours and Tylenol 1000mg every 6 to 8 hours as needed for pain.     FOLLOW UP WITH YOUR PRIMARY CARE PROVIDER    RETURN TO THE EMERGENCY DEPARTMENT FOR fever greater than 38C/100.4F longer than 5 days in a row, chest pain/shortness of breath, vomiting, or any new/concerning symptoms.

## 2025-04-05 NOTE — ED PROVIDER NOTE - CLINICAL SUMMARY MEDICAL DECISION MAKING FREE TEXT BOX
Radhames Sanchez, PGY3 - This is a 59-year-old female with past medical history of anterior wall MI s/p 1 stent, herniated disc in the lumbar area, hypertension, hyperlipidemia, CHF, asthma, presenting to the ED with flulike symptoms.  Fever body aches and cough with low-grade sputum.  Fever to 103.3.  Has taken Tylenol last night.  Some minimal relief.  Therefore he came to the ER.  Had COVID test at home that was negative.  Has been able to tolerate p.o. however decreased p.o. intake.  No nausea or vomiting.  No abdominal pain.  Minimal chest pain and shortness of breath.  EKG normal sinus and nonspecific T wave inversions.  No acute ischemia noted.  Physical exam shows slightly tired appearing female not in acute distress.  Alert and oriented x 4 moving extremities and following commands.  Not in acute respiratory distress.  No crackles or wheezing.  No murmurs.  Abdomen soft and nontender.  No pitting edema or pain to palpation of the calf muscles.  Concern at this time for flulike symptoms.  Do not think this is pneumonia however given comorbidities and age will obtain screening labs and chest x-ray.  Will give 500 cc of fluids as patient EF is documented as 35% in 2020.  Will obtain flu COVID RSV swab.  Will give Ofirmev.  Will reassess.  Disposition pending labs imaging and reassessment. Radhames Sanchez, PGY3 - This is a 59-year-old female with past medical history of anterior wall MI s/p 1 stent, herniated disc in the lumbar area, hypertension, hyperlipidemia, CHF, asthma, presenting to the ED with flulike symptoms.  Fever body aches and cough with low-grade sputum.  Fever to 103.3.  Has taken Tylenol last night.  Some minimal relief.  Therefore he came to the ER.  Had COVID test at home that was negative.  Has been able to tolerate p.o. however decreased p.o. intake.  No nausea or vomiting.  No abdominal pain.  Minimal chest pain and shortness of breath.  EKG normal sinus and nonspecific T wave inversions.  No acute ischemia noted.  Vitals wnl other than fever to 100.8F.  Physical exam shows slightly tired appearing female not in acute distress.  Alert and oriented x 4 moving extremities and following commands.  Not in acute respiratory distress.  No crackles or wheezing.  No murmurs.  Abdomen soft and nontender.  No pitting edema or pain to palpation of the calf muscles.  Concern at this time for flulike symptoms.  Do not think this is pneumonia however given comorbidities and age will obtain screening labs and chest x-ray.  Will give 500 cc of fluids as patient EF is documented as 35% in 2020.  Will obtain flu COVID RSV swab.  Will give Ofirmev.  Will reassess.  Disposition pending labs imaging and reassessment.

## 2025-04-05 NOTE — ED ADULT TRIAGE NOTE - CHIEF COMPLAINT QUOTE
Pt c/o fevers, chills, weakness cough, and sob starting last night. At home Covid test was negative. Past hx MI in 2019, taking ASA.

## 2025-04-05 NOTE — ED PROVIDER NOTE - PATIENT PORTAL LINK FT
You can access the FollowMyHealth Patient Portal offered by [facility] by registering at the following website: [email or web address]. By joining [facility]’s FollowMyHealth portal, you will also be able to view your health information using other applications (apps) compatible with our system.

## 2025-04-05 NOTE — ED PROVIDER NOTE - NSICDXPASTMEDICALHX_GEN_ALL_CORE_FT
PAST MEDICAL HISTORY:  Asthma     CAD (coronary artery disease) S/P stent placement    CHF (congestive heart failure)     Fibromyalgia     HLD (hyperlipidemia)     HTN (hypertension)     Lumbar herniated disc     Primary osteoarthritis of right knee     Seasonal allergies     STEMI (ST elevation myocardial infarction)

## 2025-04-05 NOTE — ED ADULT NURSE NOTE - PAIN: PRESENCE, MLM
Detail Level: Detailed Depth Of Biopsy: dermis Was A Bandage Applied: Yes Size Of Lesion In Cm: 0 Biopsy Type: H and E Biopsy Method: double edge Personna blade Anesthesia Type: 1% lidocaine with epinephrine Anesthesia Volume In Cc (Will Not Render If 0): 0.5 Hemostasis: TCA 35% Wound Care: Petrolatum Dressing: bandage Destruction After The Procedure: No Type Of Destruction Used: Curettage Curettage Text: The wound bed was treated with curettage after the biopsy was performed. Cryotherapy Text: The wound bed was treated with cryotherapy after the biopsy was performed. Electrodesiccation Text: The wound bed was treated with electrodesiccation after the biopsy was performed. Electrodesiccation And Curettage Text: The wound bed was treated with electrodesiccation and curettage after the biopsy was performed. complains of pain/discomfort Silver Nitrate Text: The wound bed was treated with silver nitrate after the biopsy was performed. Lab Facility: 3 Consent: Written consent was obtained and risks were reviewed including but not limited to scarring, infection, bleeding, scabbing, incomplete removal, nerve damage and allergy to anesthesia. Post-Care Instructions: I reviewed with the patient in detail post-care instructions. Patient is to keep the biopsy site dry overnight, and then apply vaseline or aquaphor twice daily until healed. Notification Instructions: Patient will be notified of biopsy results. However, patient instructed to call the office if not contacted within 2 weeks. Billing Type: Third-Party Bill Information: Selecting Yes will display possible errors in your note based on the variables you have selected. This validation is only offered as a suggestion for you. PLEASE NOTE THAT THE VALIDATION TEXT WILL BE REMOVED WHEN YOU FINALIZE YOUR NOTE. IF YOU WANT TO FAX A PRELIMINARY NOTE YOU WILL NEED TO TOGGLE THIS TO 'NO' IF YOU DO NOT WANT IT IN YOUR FAXED NOTE.

## 2025-04-05 NOTE — ED ADULT NURSE NOTE - OBJECTIVE STATEMENT
Break Shift RN: Pt received to rm 23 presents with fever, chills, and cough beginning a few days ago. Pt a&ox4, ambulatory at baseline, skin intact, respirations even and unlabored, abd soft and non-distended, nontender to palpation. Reports she took covid test at home which resulted negative. Denies chest pain, sob, n/v, diarrhea, abd pain, LE swelling. will draw labs and medicate as per ordered.

## 2025-04-10 ENCOUNTER — APPOINTMENT (OUTPATIENT)
Dept: SURGERY | Facility: CLINIC | Age: 60
End: 2025-04-10
Payer: SELF-PAY

## 2025-04-10 VITALS — WEIGHT: 169 LBS | HEIGHT: 68 IN | BODY MASS INDEX: 25.61 KG/M2

## 2025-04-10 PROCEDURE — 99213 OFFICE O/P EST LOW 20 MIN: CPT | Mod: 95

## 2025-05-07 ENCOUNTER — RX RENEWAL (OUTPATIENT)
Age: 60
End: 2025-05-07

## 2025-06-18 ENCOUNTER — APPOINTMENT (OUTPATIENT)
Dept: CARDIOLOGY | Facility: CLINIC | Age: 60
End: 2025-06-18

## 2025-07-29 ENCOUNTER — APPOINTMENT (OUTPATIENT)
Dept: SURGERY | Facility: CLINIC | Age: 60
End: 2025-07-29

## 2025-08-14 ENCOUNTER — APPOINTMENT (OUTPATIENT)
Dept: SURGERY | Facility: CLINIC | Age: 60
End: 2025-08-14
Payer: COMMERCIAL

## 2025-08-14 VITALS — HEIGHT: 68 IN | WEIGHT: 169 LBS | BODY MASS INDEX: 25.61 KG/M2

## 2025-08-14 PROCEDURE — 99213 OFFICE O/P EST LOW 20 MIN: CPT | Mod: 95

## 2025-08-25 ENCOUNTER — APPOINTMENT (OUTPATIENT)
Dept: CARDIOLOGY | Facility: CLINIC | Age: 60
End: 2025-08-25
Payer: COMMERCIAL

## 2025-08-25 VITALS
WEIGHT: 171 LBS | HEART RATE: 76 BPM | SYSTOLIC BLOOD PRESSURE: 127 MMHG | BODY MASS INDEX: 25.91 KG/M2 | DIASTOLIC BLOOD PRESSURE: 82 MMHG | HEIGHT: 68 IN | OXYGEN SATURATION: 96 %

## 2025-08-25 DIAGNOSIS — E78.5 HYPERLIPIDEMIA, UNSPECIFIED: ICD-10-CM

## 2025-08-25 DIAGNOSIS — I21.3 ST ELEVATION (STEMI) MYOCARDIAL INFARCTION OF UNSPECIFIED SITE: ICD-10-CM

## 2025-08-25 DIAGNOSIS — I25.10 ATHEROSCLEROTIC HEART DISEASE OF NATIVE CORONARY ARTERY W/OUT ANGINA PECTORIS: ICD-10-CM

## 2025-08-25 PROCEDURE — G2211 COMPLEX E/M VISIT ADD ON: CPT | Mod: NC

## 2025-08-25 PROCEDURE — 99214 OFFICE O/P EST MOD 30 MIN: CPT

## 2025-08-25 PROCEDURE — 93000 ELECTROCARDIOGRAM COMPLETE: CPT
